# Patient Record
Sex: FEMALE | Race: BLACK OR AFRICAN AMERICAN | NOT HISPANIC OR LATINO | Employment: FULL TIME | URBAN - METROPOLITAN AREA
[De-identification: names, ages, dates, MRNs, and addresses within clinical notes are randomized per-mention and may not be internally consistent; named-entity substitution may affect disease eponyms.]

---

## 2017-05-09 ENCOUNTER — TRANSFERRED RECORDS (OUTPATIENT)
Dept: HEALTH INFORMATION MANAGEMENT | Facility: CLINIC | Age: 32
End: 2017-05-09

## 2017-05-09 LAB
HPV ABSTRACT: NORMAL
PAP-ABSTRACT: NORMAL

## 2018-01-18 ENCOUNTER — THERAPY VISIT (OUTPATIENT)
Dept: PHYSICAL THERAPY | Facility: CLINIC | Age: 33
End: 2018-01-18
Payer: COMMERCIAL

## 2018-01-18 DIAGNOSIS — M54.6 ACUTE BILATERAL THORACIC BACK PAIN: ICD-10-CM

## 2018-01-18 DIAGNOSIS — M25.512 ACUTE PAIN OF LEFT SHOULDER: Primary | ICD-10-CM

## 2018-01-18 PROCEDURE — 97161 PT EVAL LOW COMPLEX 20 MIN: CPT | Mod: GP | Performed by: PHYSICAL THERAPIST

## 2018-01-18 PROCEDURE — 97110 THERAPEUTIC EXERCISES: CPT | Mod: GP | Performed by: PHYSICAL THERAPIST

## 2018-01-18 NOTE — MR AVS SNAPSHOT
"              After Visit Summary   2018    Rocio Elkins    MRN: [id number]           Patient Information     Date Of Birth          1985        Visit Information        Provider Department      2018 7:00 PM Eduardo Tran PT [facility] [facility] [place]        Today's Diagnoses     Acute pain of left shoulder    -  1    Acute bilateral thoracic back pain           Follow-ups after your visit        Who to contact     If you have questions or need follow up information about today's clinic visit or your schedule please contact [facility] directly at [phone].  Normal or non-critical lab and imaging results will be communicated to you by YYogahart, letter or phone within 4 business days after the clinic has received the results. If you do not hear from us within 7 days, please contact the clinic through YYogahart or phone. If you have a critical or abnormal lab result, we will notify you by phone as soon as possible.  Submit refill requests through [employer]es[employer] or call your pharmacy and they will forward the refill request to us. Please allow 3 business days for your refill to be completed.          Additional Information About Your Visit        MyChart Information     AesRx lets you send messages to your doctor, view your test results, renew your prescriptions, schedule appointments and more. To sign up, go to [email or web address] . Click on \"Log in\" on the left side of the screen, which will take you to the Welcome page. Then click on \"Sign up Now\" on the right side of the page.     You will be asked to enter the access code listed below, as well as some personal information. Please follow the directions to create your username and password.     Your access code is: [email or web address]  Expires: 2018  7:19 PM     Your access code will  in 90 days. If you need help or a new code, please call your [place] clinic or [phone].        Care EveryWhere " ID     This is your Care EveryWhere ID. This could be used by other organizations to access your [facility] medical records  [id number]         Blood Pressure from Last 3 Encounters:   No data found for BP    Weight from Last 3 Encounters:   No data found for Wt              We Performed the Following     HC PT EVAL, LOW COMPLEXITY     CIRA INITIAL EVAL REPORT     THERAPEUTIC EXERCISES        Primary Care Provider    None Specified       No primary provider on file.        Equal Access to Services     [facility]: Hadii aad ku hadasho Soomaali, waaxda luqadaha, qaybta kaalmada adeegyada, waxay idiin hayaan adeeg khpatriciarosa larosan . So [facility] [phone].    ATENCIÓN: Si habla español, tiene a de dios disposición servicios gratuitos de asistencia lingüística. Llame al [phone].    We comply with applicable federal civil rights laws and [place] laws. We do not discriminate on the basis of race, color, national origin, age, disability, sex, sexual orientation, or gender identity.            Thank you!     Thank you for choosing [facility]  for your care. Our goal is always to provide you with excellent care. Hearing back from our patients is one way we can continue to improve our services. Please take a few minutes to complete the written survey that you may receive in the mail after your visit with us. Thank you!             Your Updated Medication List - Protect others around you: Learn how to safely use, store and throw away your medicines at www.disposemymeds.org.      Notice  As of 1/18/2018  7:19 PM    You have not been prescribed any medications.
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
## 2018-01-26 ENCOUNTER — THERAPY VISIT (OUTPATIENT)
Dept: PHYSICAL THERAPY | Facility: CLINIC | Age: 33
End: 2018-01-26
Payer: COMMERCIAL

## 2018-01-26 DIAGNOSIS — M54.6 ACUTE BILATERAL THORACIC BACK PAIN: ICD-10-CM

## 2018-01-26 DIAGNOSIS — M25.512 ACUTE PAIN OF LEFT SHOULDER: ICD-10-CM

## 2018-01-26 PROCEDURE — 97110 THERAPEUTIC EXERCISES: CPT | Mod: GP | Performed by: PHYSICAL THERAPIST

## 2018-01-26 PROCEDURE — 97140 MANUAL THERAPY 1/> REGIONS: CPT | Mod: GP | Performed by: PHYSICAL THERAPIST

## 2018-01-29 ENCOUNTER — OFFICE VISIT (OUTPATIENT)
Dept: FAMILY MEDICINE | Facility: CLINIC | Age: 33
End: 2018-01-29
Payer: COMMERCIAL

## 2018-01-29 VITALS
BODY MASS INDEX: 22.02 KG/M2 | OXYGEN SATURATION: 100 % | HEIGHT: 66 IN | DIASTOLIC BLOOD PRESSURE: 64 MMHG | TEMPERATURE: 97.6 F | SYSTOLIC BLOOD PRESSURE: 96 MMHG | WEIGHT: 137 LBS | HEART RATE: 64 BPM

## 2018-01-29 DIAGNOSIS — R52 BODY ACHES: Primary | ICD-10-CM

## 2018-01-29 DIAGNOSIS — J06.9 VIRAL URI: ICD-10-CM

## 2018-01-29 LAB
FLUAV+FLUBV AG SPEC QL: NEGATIVE
FLUAV+FLUBV AG SPEC QL: NEGATIVE
SPECIMEN SOURCE: NORMAL

## 2018-01-29 PROCEDURE — 87804 INFLUENZA ASSAY W/OPTIC: CPT | Performed by: FAMILY MEDICINE

## 2018-01-29 PROCEDURE — 99203 OFFICE O/P NEW LOW 30 MIN: CPT | Performed by: FAMILY MEDICINE

## 2018-01-29 RX ORDER — LEVOTHYROXINE SODIUM 100 UG/1
TABLET ORAL
Refills: 1 | COMMUNITY
Start: 2018-01-10 | End: 2018-10-15

## 2018-01-29 RX ORDER — IBUPROFEN 600 MG/1
TABLET, FILM COATED ORAL
COMMUNITY
Start: 2018-01-06 | End: 2018-10-04

## 2018-01-29 RX ORDER — CHOLECALCIFEROL (VITAMIN D3) 25 MCG
TABLET ORAL
Refills: 3 | COMMUNITY
Start: 2017-11-07 | End: 2018-10-15

## 2018-01-29 RX ORDER — PNV,CALCIUM 72/IRON/FOLIC ACID 27 MG-1 MG
TABLET ORAL
Refills: 1 | COMMUNITY
Start: 2017-11-07 | End: 2019-03-19

## 2018-01-29 NOTE — NURSING NOTE
"Chief Complaint   Patient presents with     Generalized Body Aches       Initial BP 96/64  Pulse 64  Temp 97.6  F (36.4  C) (Oral)  Ht 5' 6\" (1.676 m)  Wt 137 lb (62.1 kg)  SpO2 100%  BMI 22.11 kg/m2 Estimated body mass index is 22.11 kg/(m^2) as calculated from the following:    Height as of this encounter: 5' 6\" (1.676 m).    Weight as of this encounter: 137 lb (62.1 kg).  Medication Reconciliation: complete  "

## 2018-01-29 NOTE — MR AVS SNAPSHOT
"              After Visit Summary   2018    Rocio Elkins    MRN: [id number]           Patient Information     Date Of Birth          1985        Visit Information        Provider Department      2018 3:20 PM Blake Mcdaniel Jr., MD [facility] [place]        Today's Diagnoses     Body aches    -  1    Viral URI           Follow-ups after your visit        Follow-up notes from your care team     Return in about 2 weeks (around 2018), or left shoulder pain.      Who to contact     If you have questions or need follow up information about today's clinic visit or your schedule please contact [facility] directly at [phone].  Normal or non-critical lab and imaging results will be communicated to you by MyChart, letter or phone within 4 business days after the clinic has received the results. If you do not hear from us within 7 days, please contact the clinic through MyChart or phone. If you have a critical or abnormal lab result, we will notify you by phone as soon as possible.  Submit refill requests through [employer] or call your pharmacy and they will forward the refill request to us. Please allow 3 business days for your refill to be completed.          Additional Information About Your Visit        MyChart Information     [employer] lets you send messages to your doctor, view your test results, renew your prescriptions, schedule appointments and more. To sign up, go to [email or web address] . Click on \"Log in\" on the left side of the screen, which will take you to the Welcome page. Then click on \"Sign up Now\" on the right side of the page.     You will be asked to enter the access code listed below, as well as some personal information. Please follow the directions to create your username and password.     Your access code is: [email or web address]  Expires: 2018  7:19 PM     Your access code will  in 90 days. If you need help or a new code, please call your [place] " "clinic or [phone].        Care EveryWhere ID     This is your Care EveryWhere ID. This could be used by other organizations to access your [place] medical records  [id number]        Your Vitals Were     Pulse Temperature Height Pulse Oximetry BMI (Body Mass Index)       64 97.6  F (36.4  C) (Oral) 5' 6\" (1.676 m) 100% 22.11 kg/m2        Blood Pressure from Last 3 Encounters:   01/29/18 96/64    Weight from Last 3 Encounters:   01/29/18 137 lb (62.1 kg)              We Performed the Following     Influenza A/B antigen        Primary Care Provider Fax #    Provider Not In System [phone]                Equal Access to Services     [place] : Barrett Levine, may krause, saroj paul, lobito hernández . So [facility] [phone].    ATENCIÓN: Si habla español, tiene a de dios disposición servicios gratuitos de asistencia lingüística. Llame al [phone].    We comply with applicable federal civil rights laws and [place] laws. We do not discriminate on the basis of race, color, national origin, age, disability, sex, sexual orientation, or gender identity.            Thank you!     Thank you for choosing [facility]  for your care. Our goal is always to provide you with excellent care. Hearing back from our patients is one way we can continue to improve our services. Please take a few minutes to complete the written survey that you may receive in the mail after your visit with us. Thank you!             Your Updated Medication List - Protect others around you: Learn how to safely use, store and throw away your medicines at www.disposemymeds.org.          This list is accurate as of 1/29/18  4:25 PM.  Always use your most recent med list.                   Brand Name Dispense Instructions for use Diagnosis    * cholecalciferol 1000 UNIT tablet    vitamin D3     Take 1,000 Units by mouth        * cholecalciferol 1000 UNIT tablet    vitamin D3     " TK 1 T PO D        ibuprofen 600 MG tablet    ADVIL/MOTRIN          levothyroxine 100 MCG tablet    SYNTHROID/LEVOTHROID     TK 1 T PO D        PREPLUS 27-1 MG Tabs      TK 1 T PO DAILY        * Notice:  This list has 2 medication(s) that are the same as other medications prescribed for you. Read the directions carefully, and ask your doctor or other care provider to review them with you.
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
## 2018-08-31 ENCOUNTER — OFFICE VISIT (OUTPATIENT)
Dept: FAMILY MEDICINE | Facility: CLINIC | Age: 33
End: 2018-08-31
Payer: COMMERCIAL

## 2018-08-31 ENCOUNTER — TELEPHONE (OUTPATIENT)
Dept: FAMILY MEDICINE | Facility: CLINIC | Age: 33
End: 2018-08-31

## 2018-08-31 VITALS
TEMPERATURE: 98 F | BODY MASS INDEX: 23.08 KG/M2 | DIASTOLIC BLOOD PRESSURE: 63 MMHG | HEART RATE: 87 BPM | WEIGHT: 143 LBS | SYSTOLIC BLOOD PRESSURE: 100 MMHG

## 2018-08-31 DIAGNOSIS — R31.9 URINARY TRACT INFECTION WITH HEMATURIA, SITE UNSPECIFIED: ICD-10-CM

## 2018-08-31 DIAGNOSIS — R30.0 DYSURIA: Primary | ICD-10-CM

## 2018-08-31 DIAGNOSIS — N39.0 URINARY TRACT INFECTION WITH HEMATURIA, SITE UNSPECIFIED: ICD-10-CM

## 2018-08-31 DIAGNOSIS — R82.90 ABNORMAL FINDING IN URINE: ICD-10-CM

## 2018-08-31 LAB
ALBUMIN UR-MCNC: 100 MG/DL
APPEARANCE UR: ABNORMAL
BACTERIA #/AREA URNS HPF: ABNORMAL /HPF
BILIRUB UR QL STRIP: NEGATIVE
COLOR UR AUTO: YELLOW
GLUCOSE UR STRIP-MCNC: NEGATIVE MG/DL
HGB UR QL STRIP: ABNORMAL
KETONES UR STRIP-MCNC: NEGATIVE MG/DL
LEUKOCYTE ESTERASE UR QL STRIP: ABNORMAL
NITRATE UR QL: POSITIVE
NON-SQ EPI CELLS #/AREA URNS LPF: ABNORMAL /LPF
PH UR STRIP: 5.5 PH (ref 5–7)
RBC #/AREA URNS AUTO: ABNORMAL /HPF
SOURCE: ABNORMAL
SP GR UR STRIP: 1.02 (ref 1–1.03)
UROBILINOGEN UR STRIP-ACNC: 0.2 EU/DL (ref 0.2–1)
WBC #/AREA URNS AUTO: >100 /HPF

## 2018-08-31 PROCEDURE — 81001 URINALYSIS AUTO W/SCOPE: CPT | Performed by: FAMILY MEDICINE

## 2018-08-31 PROCEDURE — 87086 URINE CULTURE/COLONY COUNT: CPT | Performed by: FAMILY MEDICINE

## 2018-08-31 PROCEDURE — 99213 OFFICE O/P EST LOW 20 MIN: CPT | Performed by: FAMILY MEDICINE

## 2018-08-31 PROCEDURE — 87088 URINE BACTERIA CULTURE: CPT | Performed by: FAMILY MEDICINE

## 2018-08-31 PROCEDURE — 87186 SC STD MICRODIL/AGAR DIL: CPT | Performed by: FAMILY MEDICINE

## 2018-08-31 RX ORDER — CIPROFLOXACIN 500 MG/1
500 TABLET, FILM COATED ORAL 2 TIMES DAILY
Qty: 10 TABLET | Refills: 0 | Status: SHIPPED | OUTPATIENT
Start: 2018-08-31 | End: 2018-10-04

## 2018-08-31 NOTE — PROGRESS NOTES
SUBJECTIVE:   Rocio Elkins is a 32 year old female who presents to clinic today for the following health issues:      URINARY TRACT SYMPTOMS      Duration: yesterday     Description  hematuria    Intensity:  mild    Accompanying signs and symptoms:  Fever/chills: YES  Flank pain YES  Nausea and vomiting: no   Vaginal symptoms: none  Abdominal/Pelvic Pain: YES    History  History of frequent UTI's: no   History of kidney stones: no   Sexually Active: YES  Possibility of pregnancy: Don't Know    Precipitating or alleviating factors: None    Therapies tried and outcome: none   Outcome:       Problem list and histories reviewed & adjusted, as indicated.  Additional history: as documented    Patient Active Problem List   Diagnosis     Acute pain of left shoulder     Acute bilateral thoracic back pain     LTBI (latent tuberculosis infection)     Other postablative hypothyroidism     Thyrotoxic exophthalmos     Hypovitaminosis D     No past surgical history on file.    Social History   Substance Use Topics     Smoking status: Never Smoker     Smokeless tobacco: Never Used     Alcohol use No     No family history on file.      Current Outpatient Prescriptions   Medication Sig Dispense Refill     cholecalciferol (VITAMIN D3) 1000 UNIT tablet Take 1,000 Units by mouth       ciprofloxacin (CIPRO) 500 MG tablet Take 1 tablet (500 mg) by mouth 2 times daily 10 tablet 0     levothyroxine (SYNTHROID/LEVOTHROID) 100 MCG tablet TK 1 T PO D  1     phenazopyridine (AZO) 97.5 MG tablet Take 2 tablets (195 mg) by mouth 3 times daily 12 tablet 1     Prenatal Vit-Fe Fumarate-FA (PREPLUS) 27-1 MG TABS TK 1 T PO DAILY  1     VITAMIN D3 1000 UNITS tablet TK 1 T PO D  3     ibuprofen (ADVIL/MOTRIN) 600 MG tablet        Allergies   Allergen Reactions     Pseudoephedrine      Other reaction(s): Other, see comments  Dizziness, leg weakness     No lab results found.   BP Readings from Last 3 Encounters:   08/31/18 100/63   01/29/18 96/64     Wt Readings from Last 3 Encounters:   08/31/18 143 lb (64.9 kg)   01/29/18 137 lb (62.1 kg)                    Reviewed and updated as needed this visit by clinical staff  Allergies       Reviewed and updated as needed this visit by Provider          Rocio Elkins is a 32 year old female who  presents today for a possible UTI. Symptoms of dysuria and frequency have been going on for 1day(s).  Hematuria yes .  sudden onsetand moderate.  There is no history of fever, chills, nausea or vomiting.  No history of vaginal or penile discharge. This patient does have a history of urinary tract infections. Patient denies long duration, rigors, flank pain, temperature > 101 degrees F., Vomiting, significant nausea or diarrhea and taking Coumadin or vaginal discharge and vaginal odor     No past medical history on file.  Current Outpatient Prescriptions   Medication Sig Dispense Refill     cholecalciferol (VITAMIN D3) 1000 UNIT tablet Take 1,000 Units by mouth       ciprofloxacin (CIPRO) 500 MG tablet Take 1 tablet (500 mg) by mouth 2 times daily 10 tablet 0     levothyroxine (SYNTHROID/LEVOTHROID) 100 MCG tablet TK 1 T PO D  1     phenazopyridine (AZO) 97.5 MG tablet Take 2 tablets (195 mg) by mouth 3 times daily 12 tablet 1     Prenatal Vit-Fe Fumarate-FA (PREPLUS) 27-1 MG TABS TK 1 T PO DAILY  1     VITAMIN D3 1000 UNITS tablet TK 1 T PO D  3     ibuprofen (ADVIL/MOTRIN) 600 MG tablet        Social History   Substance Use Topics     Smoking status: Never Smoker     Smokeless tobacco: Never Used     Alcohol use No       ROS:   Review of systems negative except as stated above.    OBJECTIVE:  /63 (BP Location: Right arm, Patient Position: Chair, Cuff Size: Adult Regular)  Pulse 87  Temp 98  F (36.7  C) (Tympanic)  Wt 143 lb (64.9 kg)  LMP 08/16/2018  BMI 23.08 kg/m2  GENERAL APPEARANCE: healthy, alert and no distress  RESP: lungs clear to auscultation - no rales, rhonchi or wheezes  CV: regular rates and rhythm,  normal S1 S2, no murmur noted  ABDOMEN:  soft, nontender, no HSM or masses and bowel sounds normal  BACK: No CVA tenderness  SKIN: no suspicious lesions or rashes    ASSESSMENT:   Rocio was seen today for uti.    Diagnoses and all orders for this visit:    Dysuria  -     Cancel: UA reflex to Microscopic and Culture  -     Cancel: *UA reflex to Microscopic and Culture ([facility] (except [place] and [place]); Future  -     Cancel: *UA reflex to Microscopic and Culture ([facility] (except [place] and [place])  -     *UA reflex to Microscopic and Culture ([facility] (except [place] and [place]); Future  -     *UA reflex to Microscopic and Culture ([facility] (except [place] and [place])  -     Urine Microscopic  -     ciprofloxacin (CIPRO) 500 MG tablet; Take 1 tablet (500 mg) by mouth 2 times daily  -     phenazopyridine (AZO) 97.5 MG tablet; Take 2 tablets (195 mg) by mouth 3 times daily    Abnormal finding in urine  -     Urine Culture Aerobic Bacterial    Urinary tract infection with hematuria, site unspecified  -     ciprofloxacin (CIPRO) 500 MG tablet; Take 1 tablet (500 mg) by mouth 2 times daily  -     phenazopyridine (AZO) 97.5 MG tablet; Take 2 tablets (195 mg) by mouth 3 times daily        Drink plenty of fluids.  Prevention and treatment of UTI's discussed.Signs and symptoms of pyelonephritis mentioned.  Follow up with primary care physician if not improving

## 2018-08-31 NOTE — TELEPHONE ENCOUNTER
Patient is sick and thinks she may have a UTI  Requesting to be seen; no openings. Patient is currently at work. Please call patient on her cell at [phone]  Madeline DERAS

## 2018-08-31 NOTE — TELEPHONE ENCOUNTER
patient is having burning with urination and would like to be added to  A schedule ok per Dr. Benavidez to see her at noon today- UA ordered- patient will leave a sample prior to the appointment time.    Elham QuirozRN BSN  [facility]  [phone]

## 2018-08-31 NOTE — MR AVS SNAPSHOT
"              After Visit Summary   8/31/2018    Rocio Elkins    MRN: [id number]           Patient Information     Date Of Birth          1985        Visit Information        Provider Department      8/31/2018 12:00 PM [facility]        Today's Diagnoses     Dysuria    -  1    Abnormal finding in urine        Urinary tract infection with hematuria, site unspecified           Follow-ups after your visit        Follow-up notes from your care team     Return in about 1 year (around 8/31/2019) for Routine Visit, Physical Exam, Lab Work.      Who to contact     If you have questions or need follow up information about today's clinic visit or your schedule please contact [facility] directly at [phone].  Normal or non-critical lab and imaging results will be communicated to you by MyChart, letter or phone within 4 business days after the clinic has received the results. If you do not hear from us within 7 days, please contact the clinic through MyChart or phone. If you have a critical or abnormal lab result, we will notify you by phone as soon as possible.  Submit refill requests through [employer] or call your pharmacy and they will forward the refill request to us. Please allow 3 business days for your refill to be completed.          Additional Information About Your Visit        MyChart Information     [employer] lets you send messages to your doctor, view your test results, renew your prescriptions, schedule appointments and more. To sign up, go to [email or web address] . Click on \"Log in\" on the left side of the screen, which will take you to the Welcome page. Then click on \"Sign up Now\" on the right side of the page.     You will be asked to enter the access code listed below, as well as some personal information. Please follow the directions to create your username and password.     Your access code is: [email or web address]  Expires: 11/29/2018 12:29 PM   "   Your access code will  in 90 days. If you need help or a new code, please call your [facility] clinic or [phone].        Care EveryWhere ID     This is your Care EveryWhere ID. This could be used by other organizations to access your [facility] medical records  [id number]        Your Vitals Were     Pulse Temperature Last Period BMI (Body Mass Index)          87 98  F (36.7  C) (Tympanic) 2018 23.08 kg/m2         Blood Pressure from Last 3 Encounters:   18 100/63   18 96/64    Weight from Last 3 Encounters:   18 143 lb (64.9 kg)   18 137 lb (62.1 kg)              We Performed the Following     *UA reflex to Microscopic and Culture ([place] and [facility] (except [place] and [place])     Urine Culture Aerobic Bacterial     Urine Microscopic          Today's Medication Changes          These changes are accurate as of 18 12:29 PM.  If you have any questions, ask your nurse or doctor.               Start taking these medicines.        Dose/Directions    ciprofloxacin 500 MG tablet   Commonly known as:  CIPRO   Used for:  Dysuria, Urinary tract infection with hematuria, site unspecified   Started by:  Viet Benavidez MD        Dose:  500 mg   Take 1 tablet (500 mg) by mouth 2 times daily   Quantity:  10 tablet   Refills:  0       phenazopyridine 97.5 MG tablet   Commonly known as:  AZO   Used for:  Dysuria, Urinary tract infection with hematuria, site unspecified   Started by:  Viet Benavidez MD        Dose:  195 mg   Take 2 tablets (195 mg) by mouth 3 times daily   Quantity:  12 tablet   Refills:  1            Where to get your medicines      These medications were sent to [facility] [place] [address]  [address] [facility] [address]     Phone:  [phone]     ciprofloxacin 500 MG tablet    phenazopyridine 97.5 MG tablet                Primary Care Provider Office Phone # Fax #    Dulce Guevara  [facility] [phone]       [address] [place] [address]        Equal Access to Services     MORELIA MONTANA : Hadii farzad de leon carlos Levine, waricoda lufloyd, qaybta kamoriahda humberto, lobito walter. So [facility] [phone].    ATENCIÓN: Si habla español, tiene a de dios disposición servicios gratuitos de asistencia lingüística. Llame al [phone].    We comply with applicable federal civil rights laws and [place] laws. We do not discriminate on the basis of race, color, national origin, age, disability, sex, sexual orientation, or gender identity.            Thank you!     [facility]. Our goal is always to provide you with excellent care. Hearing back from our patients is one way we can continue to improve our services. Please take a few minutes to complete the written survey that you may receive in the mail after your visit with us. Thank you!             Your Updated Medication List - Protect others around you: Learn how to safely use, store and throw away your medicines at www.disposemymeds.org.          This list is accurate as of 8/31/18 12:29 PM.  Always use your most recent med list.                   Brand Name Dispense Instructions for use Diagnosis    * cholecalciferol 1000 UNIT tablet    vitamin D3     Take 1,000 Units by mouth        * cholecalciferol 1000 UNIT tablet    vitamin D3     TK 1 T PO D        ciprofloxacin 500 MG tablet    CIPRO    10 tablet    Take 1 tablet (500 mg) by mouth 2 times daily    Dysuria, Urinary tract infection with hematuria, site unspecified       ibuprofen 600 MG tablet    ADVIL/MOTRIN          levothyroxine 100 MCG tablet    SYNTHROID/LEVOTHROID     TK 1 T PO D        phenazopyridine 97.5 MG tablet    AZO    12 tablet    Take 2 tablets (195 mg) by mouth 3 times daily    Dysuria, Urinary tract infection with hematuria, site unspecified       PREPLUS 27-1 MG Tabs      TK 1 T PO  DAILY        * Notice:  This list has 2 medication(s) that are the same as other medications prescribed for you. Read the directions carefully, and ask your doctor or other care provider to review them with you.

## 2018-08-31 NOTE — LETTER
[address] [address]  Phone: [phone]    August 31, 2018        Rocio Elkins  [address] [place] [address]   [facility] [address]          To whom it may concern:    RE: Rocio Elkins    Patient was seen and treated today at our clinic. Based on her symptom, please excuse her from her duty for 1 day.     Please contact me for questions or concerns.      Sincerely,        Viet Benavidez MD

## 2018-09-02 LAB
BACTERIA SPEC CULT: ABNORMAL
SPECIMEN SOURCE: ABNORMAL

## 2018-09-25 ENCOUNTER — APPOINTMENT (OUTPATIENT)
Dept: ULTRASOUND IMAGING | Facility: CLINIC | Age: 33
End: 2018-09-25
Attending: NURSE PRACTITIONER
Payer: COMMERCIAL

## 2018-09-25 ENCOUNTER — HOSPITAL ENCOUNTER (EMERGENCY)
Facility: CLINIC | Age: 33
Discharge: HOME | End: 2018-09-25
Attending: NURSE PRACTITIONER | Admitting: NURSE PRACTITIONER
Payer: COMMERCIAL

## 2018-09-25 VITALS
RESPIRATION RATE: 14 BRPM | WEIGHT: 141 LBS | SYSTOLIC BLOOD PRESSURE: 104 MMHG | HEART RATE: 96 BPM | OXYGEN SATURATION: 100 % | DIASTOLIC BLOOD PRESSURE: 72 MMHG | HEIGHT: 67 IN | BODY MASS INDEX: 22.13 KG/M2 | TEMPERATURE: 97.7 F

## 2018-09-25 DIAGNOSIS — R10.2 ABDOMINAL PAIN, SUPRAPUBIC: ICD-10-CM

## 2018-09-25 DIAGNOSIS — Z3A.01 LESS THAN 8 WEEKS GESTATION OF PREGNANCY: ICD-10-CM

## 2018-09-25 LAB
ABO + RH BLD: NORMAL
ALBUMIN UR-MCNC: NEGATIVE MG/DL
ANION GAP SERPL CALCULATED.3IONS-SCNC: 11 MMOL/L (ref 3–14)
APPEARANCE UR: CLEAR
B-HCG SERPL-ACNC: ABNORMAL IU/L (ref 0–5)
BASOPHILS # BLD AUTO: 0 10E9/L (ref 0–0.2)
BASOPHILS NFR BLD AUTO: 0.2 %
BILIRUB UR QL STRIP: NEGATIVE
BLD GP AB SCN SERPL QL: NORMAL
BLOOD BANK CMNT PATIENT-IMP: NORMAL
BUN SERPL-MCNC: 6 MG/DL (ref 7–30)
CALCIUM SERPL-MCNC: 9.5 MG/DL (ref 8.5–10.1)
CHLORIDE SERPL-SCNC: 106 MMOL/L (ref 94–109)
CO2 SERPL-SCNC: 21 MMOL/L (ref 20–32)
COLOR UR AUTO: YELLOW
CREAT SERPL-MCNC: 0.62 MG/DL (ref 0.52–1.04)
DIFFERENTIAL METHOD BLD: ABNORMAL
EOSINOPHIL # BLD AUTO: 0.1 10E9/L (ref 0–0.7)
EOSINOPHIL NFR BLD AUTO: 0.8 %
ERYTHROCYTE [DISTWIDTH] IN BLOOD BY AUTOMATED COUNT: 13 % (ref 10–15)
FETAL CELL SCN BLD QL ROSETTE: NORMAL
GFR SERPL CREATININE-BSD FRML MDRD: >90 ML/MIN/1.7M2
GLUCOSE SERPL-MCNC: 80 MG/DL (ref 70–99)
GLUCOSE UR STRIP-MCNC: NEGATIVE MG/DL
HCG SERPL QL: POSITIVE
HCT VFR BLD AUTO: 41.5 % (ref 35–47)
HGB BLD-MCNC: 14.5 G/DL (ref 11.7–15.7)
HGB UR QL STRIP: NEGATIVE
IMM GRANULOCYTES # BLD: 0 10E9/L (ref 0–0.4)
IMM GRANULOCYTES NFR BLD: 0.3 %
KETONES UR STRIP-MCNC: 80 MG/DL
LEUKOCYTE ESTERASE UR QL STRIP: NEGATIVE
LYMPHOCYTES # BLD AUTO: 2.1 10E9/L (ref 0.8–5.3)
LYMPHOCYTES NFR BLD AUTO: 19.1 %
MCH RBC QN AUTO: 29.4 PG (ref 26.5–33)
MCHC RBC AUTO-ENTMCNC: 34.9 G/DL (ref 31.5–36.5)
MCV RBC AUTO: 84 FL (ref 78–100)
MONOCYTES # BLD AUTO: 0.6 10E9/L (ref 0–1.3)
MONOCYTES NFR BLD AUTO: 5.6 %
MUCOUS THREADS #/AREA URNS LPF: PRESENT /LPF
NEUTROPHILS # BLD AUTO: 8.2 10E9/L (ref 1.6–8.3)
NEUTROPHILS NFR BLD AUTO: 74 %
NITRATE UR QL: NEGATIVE
NRBC # BLD AUTO: 0 10*3/UL
NRBC BLD AUTO-RTO: 0 /100
PH UR STRIP: 8 PH (ref 5–7)
PLATELET # BLD AUTO: 225 10E9/L (ref 150–450)
POTASSIUM SERPL-SCNC: 3.6 MMOL/L (ref 3.4–5.3)
RBC # BLD AUTO: 4.94 10E12/L (ref 3.8–5.2)
RBC #/AREA URNS AUTO: <1 /HPF (ref 0–2)
SODIUM SERPL-SCNC: 138 MMOL/L (ref 133–144)
SOURCE: ABNORMAL
SP GR UR STRIP: 1.01 (ref 1–1.03)
SPECIMEN EXP DATE BLD: NORMAL
SQUAMOUS #/AREA URNS AUTO: 1 /HPF (ref 0–1)
UROBILINOGEN UR STRIP-MCNC: NORMAL MG/DL (ref 0–2)
WBC # BLD AUTO: 11.1 10E9/L (ref 4–11)
WBC #/AREA URNS AUTO: <1 /HPF (ref 0–5)

## 2018-09-25 PROCEDURE — 81001 URINALYSIS AUTO W/SCOPE: CPT | Performed by: NURSE PRACTITIONER

## 2018-09-25 PROCEDURE — 25000128 H RX IP 250 OP 636: Performed by: NURSE PRACTITIONER

## 2018-09-25 PROCEDURE — 85025 COMPLETE CBC W/AUTO DIFF WBC: CPT | Performed by: NURSE PRACTITIONER

## 2018-09-25 PROCEDURE — 76817 TRANSVAGINAL US OBSTETRIC: CPT

## 2018-09-25 PROCEDURE — 86901 BLOOD TYPING SEROLOGIC RH(D): CPT | Performed by: NURSE PRACTITIONER

## 2018-09-25 PROCEDURE — 80048 BASIC METABOLIC PNL TOTAL CA: CPT | Performed by: NURSE PRACTITIONER

## 2018-09-25 PROCEDURE — 85461 HEMOGLOBIN FETAL: CPT | Performed by: NURSE PRACTITIONER

## 2018-09-25 PROCEDURE — 86900 BLOOD TYPING SEROLOGIC ABO: CPT | Performed by: NURSE PRACTITIONER

## 2018-09-25 PROCEDURE — 84702 CHORIONIC GONADOTROPIN TEST: CPT | Performed by: NURSE PRACTITIONER

## 2018-09-25 PROCEDURE — 96374 THER/PROPH/DIAG INJ IV PUSH: CPT

## 2018-09-25 PROCEDURE — 84703 CHORIONIC GONADOTROPIN ASSAY: CPT | Performed by: NURSE PRACTITIONER

## 2018-09-25 PROCEDURE — 86850 RBC ANTIBODY SCREEN: CPT | Performed by: NURSE PRACTITIONER

## 2018-09-25 PROCEDURE — 99284 EMERGENCY DEPT VISIT MOD MDM: CPT | Mod: 25

## 2018-09-25 RX ORDER — ONDANSETRON 2 MG/ML
4 INJECTION INTRAMUSCULAR; INTRAVENOUS ONCE
Status: COMPLETED | OUTPATIENT
Start: 2018-09-25 | End: 2018-09-25

## 2018-09-25 RX ORDER — ONDANSETRON 2 MG/ML
4 INJECTION INTRAMUSCULAR; INTRAVENOUS
Status: DISCONTINUED | OUTPATIENT
Start: 2018-09-25 | End: 2018-09-25 | Stop reason: HOSPADM

## 2018-09-25 RX ADMIN — ONDANSETRON 4 MG: 2 INJECTION INTRAMUSCULAR; INTRAVENOUS at 13:10

## 2018-09-25 ASSESSMENT — ENCOUNTER SYMPTOMS
NAUSEA: 1
ABDOMINAL PAIN: 1
HEMATURIA: 0
VOMITING: 1
CONSTIPATION: 0
DIARRHEA: 0
DYSURIA: 0

## 2018-09-25 NOTE — ED AVS SNAPSHOT
Emergency Department    [address] [facility] [address]    Phone:  [phone]    Fax:  [phone]                                       Rocio Elkins   MRN: [id number]    Department:   Emergency Department   Date of Visit:  9/25/2018           Patient Information     Date Of Birth          1985        Your diagnoses for this visit were:     Abdominal pain, suprapubic     Less than 8 weeks gestation of pregnancy        You were seen by Vidhya Duke, CNP.      Follow-up Information     Follow up with OB/GYN INFERTILITY In 2 days.    Why:  or with your OB for recheck of symptoms, HCG, and pelvic examination or return to ED with any change or worsening symptoms.     Contact information:    [address]  [facility] [phone]        Follow up with  Emergency Department.    Specialty:  EMERGENCY MEDICINE    Why:  As needed, If symptoms worsen    Contact information:    [address] [facility] [address] [phone]        Discharge Instructions         Pelvic Pain, Uncertain Cause    Pelvic pain is pain felt in the lowest part of the belly (abdomen) and between the hipbones. The pain may occur suddenly and recently (acute). Or the pain may last for 6 months or longer (chronic).  There are many possible causes of pelvic pain. The pain may be due to a problem in the female reproductive system. Or, it may be due to a problem in the digestive, urinary, or musculoskeletal systems.  Based on your visit today, the exact cause of your pelvic pain is not certain. Your condition does not appear to be serious at this time. But it is important for you to keep watching for any new symptoms or worsening of your condition.  General care  Your healthcare provider may advise a number of ways to help manage your pain. These can include:    Taking over-the-counter pain medicine. Stronger pain medicine may also be prescribed, if needed.    Applying  "heat to the pelvic area. Use a heating pad or a hot pack. Taking a hot bath may also help.    Getting plenty of rest.    Making certain lifestyle changes. These can include practicing good posture and getting regular exercise. Studies have shown that these changes help reduce pelvic pain in some women.    Seeing a physical therapist or pain specialist. These healthcare providers can discuss other ways to manage pain with you.  Follow-up care  Follow up with your healthcare provider, or as advised.   When to seek medical advice  Call your healthcare provider right away if any of the following occur:    Fever of 100.4 F or higher, or as directed by your healthcare provider    Pain worsens or you have sudden, severe pain or new pain    Nausea, vomiting, sweating, or restlessness    Dizziness or fainting    Unusual vaginal discharge    Abnormal vaginal bleeding (especially bleeding after menopause)  Date Last Reviewed: 10/1/2017    8351-5743 [employer]. [address] [place] [address]. All rights reserved. This information is not intended as a substitute for professional medical care. Always follow your healthcare professional's instructions.          Pregnancy: Your First Trimester Changes  The first trimester is a time of rapid development for your baby. Because your baby is growing so quickly, it is important that you start a healthy lifestyle right away. By the end of the first trimester, your baby has formed all of its major body organs and weighs just over an ounce.     Actual size of baby is 1/4\"    Month 1 (weeks 1 to 4)  The placenta (the organ that nourishes your baby) begins to form. The brain, spinal cord, heart, gastrointestinal tract, and lungs begin to develop. Your baby is about 1/4-inch long by the end of the first month.     Actual size of baby is 1\"      Month 2 (weeks 5 to 8)  All of your baby s major body organs form. The face, fingers, toes, ears, and eyes appear. By the " "end of the month, your baby is about 1-inch long.     Actual size of baby is 3\"      Month 3 (weeks 9 to 12)  Your baby can open and close its fists and mouth. The sexual organs begin to form. As the first trimester ends, your baby is about 3-inches long.  Date Last Reviewed: 10/1/2017    1679-4910 [employer]. [address] [place] [address]. All rights reserved. This information is not intended as a substitute for professional medical care. Always follow your healthcare professional's instructions.          Your next 10 appointments already scheduled     Sep 28, 2018  8:00 AM CDT   Office Visit with OMAR [facility] ([facility])    [address] [place] [address] [phone]           Bring a current list of meds and any records pertaining to this visit. For Physicals, please bring immunization records and any forms needing to be filled out. Please arrive 10 minutes early to complete paperwork.              24 Hour Appointment Hotline       To make an appointment at any [facility], call [phone]). If you don't have a family doctor or clinic, we will help you find one. [facility] are conveniently located to serve the needs of you and your family.             Review of your medicines      Our records show that you are taking the medicines listed below. If these are incorrect, please call your family doctor or clinic.        Dose / Directions Last dose taken    * cholecalciferol 1000 UNIT tablet   Commonly known as:  vitamin D3   Dose:  1000 Units        Take 1,000 Units by mouth   Refills:  0        * cholecalciferol 1000 UNIT tablet   Commonly known as:  vitamin D3        TK 1 T PO D   Refills:  3        ciprofloxacin 500 MG tablet   Commonly known as:  CIPRO   Dose:  500 mg   Quantity:  10 tablet        Take 1 tablet (500 mg) by mouth 2 times daily   Refills:  0        ibuprofen 600 MG tablet "   Commonly known as:  ADVIL/MOTRIN        Refills:  0        levothyroxine 100 MCG tablet   Commonly known as:  SYNTHROID/LEVOTHROID        TK 1 T PO D   Refills:  1        phenazopyridine 97.5 MG tablet   Commonly known as:  AZO   Dose:  195 mg   Quantity:  12 tablet        Take 2 tablets (195 mg) by mouth 3 times daily   Refills:  1        PREPLUS 27-1 MG Tabs        TK 1 T PO DAILY   Refills:  1        * Notice:  This list has 2 medication(s) that are the same as other medications prescribed for you. Read the directions carefully, and ask your doctor or other care provider to review them with you.            Procedures and tests performed during your visit     ABO/Rh type and screen    Basic metabolic panel    CBC with platelets differential    HCG qualitative Blood    HCG quantitative pregnancy    Rh Immune Globulin Study    Rho (D) immune globulin (RhoGam) Lab Study    UA with Microscopic reflex to Culture    US OB < 14 Weeks w Transvaginal      Orders Needing Specimen Collection     None      Pending Results     No orders found from 9/23/2018 to 9/26/2018.            Pending Culture Results     No orders found from 9/23/2018 to 9/26/2018.            Pending Results Instructions     If you had any lab results that were not finalized at the time of your Discharge, you can call the ED Lab Result RN at [phone]. You will be contacted by this team for any positive Lab results or changes in treatment. The nurses are available 7 days a week from 10A to 6:30P.  You can leave a message 24 hours per day and they will return your call.        Test Results From Your Hospital Stay        9/25/2018  1:28 PM      Component Results     Component Value Ref Range & Units Status    WBC 11.1 (H) 4.0 - 11.0 10e9/L Final    RBC Count 4.94 3.8 - 5.2 10e12/L Final    Hemoglobin 14.5 11.7 - 15.7 g/dL Final    Hematocrit 41.5 35.0 - 47.0 % Final    MCV 84 78 - 100 fl Final    MCH 29.4 26.5 - 33.0 pg Final    MCHC 34.9 31.5 - 36.5  g/dL Final    RDW 13.0 10.0 - 15.0 % Final    Platelet Count 225 150 - 450 10e9/L Final    Diff Method Automated Method  Final    % Neutrophils 74.0 % Final    % Lymphocytes 19.1 % Final    % Monocytes 5.6 % Final    % Eosinophils 0.8 % Final    % Basophils 0.2 % Final    % Immature Granulocytes 0.3 % Final    Nucleated RBCs 0 0 /100 Final    Absolute Neutrophil 8.2 1.6 - 8.3 10e9/L Final    Absolute Lymphocytes 2.1 0.8 - 5.3 10e9/L Final    Absolute Monocytes 0.6 0.0 - 1.3 10e9/L Final    Absolute Eosinophils 0.1 0.0 - 0.7 10e9/L Final    Absolute Basophils 0.0 0.0 - 0.2 10e9/L Final    Abs Immature Granulocytes 0.0 0 - 0.4 10e9/L Final    Absolute Nucleated RBC 0.0  Final         9/25/2018  1:53 PM      Component Results     Component Value Ref Range & Units Status    Sodium 138 133 - 144 mmol/L Final    Potassium 3.6 3.4 - 5.3 mmol/L Final    Chloride 106 94 - 109 mmol/L Final    Carbon Dioxide 21 20 - 32 mmol/L Final    Anion Gap 11 3 - 14 mmol/L Final    Glucose 80 70 - 99 mg/dL Final    Urea Nitrogen 6 (L) 7 - 30 mg/dL Final    Creatinine 0.62 0.52 - 1.04 mg/dL Final    GFR Estimate >90 >60 mL/min/1.7m2 Final    Non  GFR Calc    GFR Estimate If Black >90 >60 mL/min/1.7m2 Final    African American GFR Calc    Calcium 9.5 8.5 - 10.1 mg/dL Final         9/25/2018  1:35 PM      Component Results     Component Value Ref Range & Units Status    HCG Qualitative Serum Positive (A) NEG^Negative Final    This test is for screening purposes.  Results should be interpreted along with   the clinical picture.  Confirmation testing is available if warranted by   ordering [id number], HCG Quantitative Pregnancy.           9/25/2018  2:02 PM      Component Results     Component Value Ref Range & Units Status    ABO O  Final    RH(D) Pos  Final    Antibody Screen Neg  Final    Test Valid Only At [facility]     Final    Specimen Expires 09/28/2018  Final         9/25/2018  1:18 PM      Component  Results     Component    Rhogam Order    Order received    Comment:    See Rhogam Study/Suitability         9/25/2018  2:25 PM      Narrative     ULTRASOUND OBSTETRIC FIRST TRIMESTER WITH TRANSVAGINAL IMAGING   9/25/2018 2:17 PM    HISTORY: 5w5d with cramping.     TECHNIQUE: Transabdominal and transvaginal imaging were performed.   Transvaginal imaging was performed to better evaluate the uterus and  gestational sac.    COMPARISON:  None.    FINDINGS:    Estimated gestational age by current ultrasound measurement: 6 weeks 2  days.  Estimated date of delivery based on this ultrasound: May 19, 2019.  Crown-rump length: 0.48 cm.   Embryonic cardiac activity: 151 bpm.   Yolk sac: Present.  Subchorionic hemorrhage: None.    Right ovary: Unremarkable.  Left ovary: Unremarkable.  Adnexal mass: None.  Free pelvic fluid: None.        Impression     IMPRESSION: Single living intrauterine pregnancy. No acute process  demonstrated.    KARISSA COHCRAN MD         9/25/2018  4:05 PM      Component Results     Component Value Ref Range & Units Status    Color Urine Yellow  Final    Appearance Urine Clear  Final    Glucose Urine Negative NEG^Negative mg/dL Final    Bilirubin Urine Negative NEG^Negative Final    Ketones Urine 80 (A) NEG^Negative mg/dL Final    Specific Gravity Urine 1.015 1.003 - 1.035 Final    Blood Urine Negative NEG^Negative Final    pH Urine 8.0 (H) 5.0 - 7.0 pH Final    Protein Albumin Urine Negative NEG^Negative mg/dL Final    Urobilinogen mg/dL Normal 0.0 - 2.0 mg/dL Final    Nitrite Urine Negative NEG^Negative Final    Leukocyte Esterase Urine Negative NEG^Negative Final    Source Midstream Urine  Final    WBC Urine <1 0 - 5 /HPF Final    RBC Urine <1 0 - 2 /HPF Final    Squamous Epithelial /HPF Urine 1 0 - 1 /HPF Final    Mucous Urine Present (A) NEG^Negative /LPF Final         9/25/2018  2:05 PM      Component Results     Component    ABO    O    RH(D)    Pos    Fetal Blood Screen    Canceled, Test credited     Blood Bank Comment    Not suitable for Rh Immune Globulin  PATIENT IS RH POS           9/25/2018  3:38 PM      Component Results     Component Value Ref Range & Units Status    HCG Quantitative Serum 49696 (H) 0 - 5 IU/L Final                Clinical Quality Measure: Blood Pressure Screening     Your blood pressure was checked while you were in the emergency department today. The last reading we obtained was  BP: 104/72 . Please read the guidelines below about what these numbers mean and what you should do about them.  If your systolic blood pressure (the top number) is less than 120 and your diastolic blood pressure (the bottom number) is less than 80, then your blood pressure is normal. There is nothing more that you need to do about it.  If your systolic blood pressure (the top number) is 120-139 or your diastolic blood pressure (the bottom number) is 80-89, your blood pressure may be higher than it should be. You should have your blood pressure rechecked within a year by a primary care provider.  If your systolic blood pressure (the top number) is 140 or greater or your diastolic blood pressure (the bottom number) is 90 or greater, you may have high blood pressure. High blood pressure is treatable, but if left untreated over time it can put you at risk for heart attack, stroke, or kidney failure. You should have your blood pressure rechecked by a primary care provider within the next 4 weeks.  If your provider in the emergency department today gave you specific instructions to follow-up with your doctor or provider even sooner than that, you should follow that instruction and not wait for up to 4 weeks for your follow-up visit.        Thank you for choosing [facility]       Thank you for choosing [facility] for your care. Our goal is always to provide you with excellent care. Hearing back from our patients is one way we can continue to improve our services. Please take a few minutes to complete the written survey  "that you may receive in the mail after you visit with us. Thank you!        [employer] Information     [employer] lets you send messages to your doctor, view your test results, renew your prescriptions, schedule appointments and more. To sign up, go to [email or web address] . Click on \"Log in\" on the left side of the screen, which will take you to the Welcome page. Then click on \"Sign up Now\" on the right side of the page.     You will be asked to enter the access code listed below, as well as some personal information. Please follow the directions to create your username and password.     Your access code is: [email or web address]  Expires: 2018 12:29 PM     Your access code will  in 90 days. If you need help or a new code, please call your [facility] clinic or [phone].        Care EveryWhere ID     This is your Care EveryWhere ID. This could be used by other organizations to access your [facility] medical records  [id number]        Equal Access to Services     MORELIA MON[place]NA : Hadii farzad mcleano Sogibson, waaxda luqadaha, qaybta kaalmada adealyson, lobito hernández . So [facility] [phone].    ATENCIÓN: Si habla español, tiene a de dios disposición servicios gratuitos de asistencia lingüística. Llame al [phone].    We comply with applicable federal civil rights laws and [place] laws. We do not discriminate on the basis of race, color, national origin, age, disability, sex, sexual orientation, or gender identity.            After Visit Summary       This is your record. Keep this with you and show to your community pharmacist(s) and doctor(s) at your next visit.                  "

## 2018-09-25 NOTE — ED PROVIDER NOTES
History     Chief Complaint:  Abdominal pain    HPI   Rocio Elkins is a 32 year old  female who presents with concern for suprapubic abdominal pain. The patient reports that last night she noted midline low abdominal pain which has worsened today and developed associated nausea and emesis, prompting her to present to the emergency department. She notes that she took an at home pregnancy test that resulted positive last week, and that her last pregnancy began just over one month ago. She denies vaginal bleeding, vaginal discharge, urinary symptoms, constipation, diarrhea, taking analgesics, or other concerns here in the ER today. She does not know her blood type.    Allergies:  Pseudoephedrine    Medications:    Vitamin D  Levothyroxine  Azo     Past Medical History:    Thyrotoxic exophthalmos  Hypovitaminosis D  Latent tuberculosis   Hypothyroidism    Past Surgical History:    The patient does not have any pertinent past surgical history.    Family History:    No past pertinent family history.    Social History:  Marital Status:   [2]  Negative for tobacco use.  Negative for alcohol use.    Review of Systems   Gastrointestinal: Positive for abdominal pain, nausea and vomiting. Negative for constipation and diarrhea.   Genitourinary: Negative for dysuria, hematuria and vaginal bleeding.   All other systems reviewed and are negative.    Physical Exam     Patient Vitals for the past 24 hrs:   BP Temp Temp src Pulse Heart Rate Resp SpO2 Height Weight   18 1631 - - - - - 14 100 % - -   18 1515 - - - - - - 100 % - -   18 1500 104/72 - - - - - 100 % - -   18 1440 102/68 - - - 82 16 100 % - -   18 1430 - - - - - - 100 % - -   18 1420 101/68 - - - 78 20 100 % - -   18 1415 - - - - - - 100 % - -   18 1412 (!) 110/31 - - - 90 20 - - -   18 1315 - - - - - - 100 % - -   18 1310 - - - - - - 100 % - -   18 1303 - - - - - - 100 % - -   18 1301  "102/66 - - - 83 20 100 % - -   09/25/18 1300 - - - - - - 100 % - -   09/25/18 1255 - - - - - - 100 % - -   09/25/18 1244 102/55 97.7  F (36.5  C) Oral 96 - 20 98 % 1.702 m (5' 7\") 64 kg (141 lb)     Physical Exam  Nursing notes reviewed. Vitals reviewed.  General: Alert. Well kept.  Eyes:  Conjunctiva non-injected, non-icteric.  Neck/Throat: Moist mucous membranes. Normal voice.  Cardiac: Regular rhythm. Normal heart sounds with no murmur/rubs/click.   Pulmonary: Clear and equal breath sounds bilaterally. No crackles/rales. No wheezing  Abdomen: Soft. Non-distended. Very mild suprapubic tenderness without focal LLQ or RLQ pain.  No upper abdominal tenderness.   Musculoskeletal: Normal gross range of motion of all 4 extremities.    Neurological: Alert and oriented x4.   Skin: Warm and dry without rashes or petechiae. Normal appearance of visualized exposed skin.  Psych: Affect normal. Good eye contact.    Emergency Department Course     Imaging:  Radiographic findings were communicated with the patient who voiced understanding of the findings.    US OB, <14 w Transvaginal:  Single living intrauterine pregnancy. No acute process  demonstrated. as per radiology.     Laboratory:  CBC: WBC: 11.1 (H), HGB: 14.5, PLT: 225  BMP: BUN: 6 (L), o/w WNL (Creatinine: 0.62)  HCG blood: positive  ABO/RH type and screen: O pos    UA with Microscopic: urineketon: 80 (A), pH urine: 8.0 (H), Mucous: present (A), o/w WNL    Rho (D): Order received  Rh Immune Globulin Study: O pos    Interventions:    1310 Zofran, 4 mg, IV injection    Emergency Department Course:  Nursing notes and vitals reviewed. (2229) I performed an exam of the patient as documented above.     IV inserted. Medicine administered as documented above. Blood drawn. This was sent to the lab for further testing, results above.    The patient was sent for a US OB while in the emergency department, findings above.     1530 Reassessment.  Patient states her pain is resolved. "  I had a detailed discussion of need for pelvic examination and patient declines understanding risks versus benefits and is competent and capable of making this decision.     The patient provided a urine sample here in the emergency department. This was sent for laboratory testing, findings above.     (1521) I rechecked the patient and discussed the results of her workup thus far.     (1618) I rechecked the patient and discussed her care plan going forward.  Reassessment.  No RLQ or LLQ tenderness.  States suprapubic tenderness is 1/10 and much improved.     Findings and plan explained to the Patient. Patient discharged home with instructions regarding supportive care, medications, and reasons to return. The importance of close follow-up was reviewed.     I personally reviewed the laboratory results with the Patient and answered all related questions prior to discharge.     Impression & Plan      Medical Decision Making:  Rocio Elkins is a 32 year old female who presents for suprapubic abdominal pain with positive home pregnancy test. Differential diagnosis is broad and includes ovarian torsion, ovarian cyst, ectopic pregnancy, urinary tract infection, pyelonephritis, intraabdominal etiologies such as appendicitis, or diverticulitis. On examination she has mild suprapubic tenderness but she has no focal left lower quadrant or right lower quadrant tenderness. She has no CVA tenderness. CBC and BMP today show mild leukocytosis of 11.1 with a normal kidney function. [place] [address] and ultrasound today reveals single live intrauterine pregnancy with a cardiac activity at 151 BPM. There is no subchorionic hemorrhage and left and right ovaries are without abnormalities and without adnexal mass. She is AB positive and has no vaginal bleeding. Her urine is without infection. I discussed a pelvic exam to complete exam today, and patient denies vaginal discharge and declines pelvic examination today. With no vaginal  bleeding and resolution of her pain I feel that this is appropriate, however I did discuss her examination is incomplete today. She will follow up with OB in two days and return to the ED with any worsening or change in her symptoms. Her symptoms today are not consistent with appendicitis as she has no right lower quadrant tenderness or diverticulitis with no LLQ tenderness, but I did discuss the sometimes vague nature of intraabdominal disease and that is her pain returns, localizes to RLQ, worsens, vomits, or gets fevers she must immediately return to the ED. Thus, she is discharged home for follow up with OB in two days.    Diagnosis:    ICD-10-CM    1. Abdominal pain, suprapubic R10.2    2. Less than 8 weeks gestation of pregnancy Z3A.01      Disposition:  discharged to home    Scribe Disclosure:  I, Roberto Bosch, am serving as a scribe on 9/25/2018 at 1:02 PM to personally document services performed by Vidhya Duke CNP based on my observations and the provider's statements to me.       Roberto Bosch  9/25/2018    EMERGENCY DEPARTMENT       Vidhya Duke CNP  09/25/18 0158

## 2018-09-25 NOTE — ED AVS SNAPSHOT
Emergency Department    [address] [place] [id number]    Phone:  [phone]    Fax:  [phone]                                       Rocio Elkins   MRN: [id number]    Department:   Emergency Department   Date of Visit:  9/25/2018           After Visit Summary Signature Page     I have received my discharge instructions, and my questions have been answered. I have discussed any challenges I see with this plan with the nurse or doctor.    ..........................................................................................................................................  Patient/Patient Representative Signature      ..........................................................................................................................................  Patient Representative Print Name and Relationship to Patient    ..................................................               ................................................  Date                                   Time    ..........................................................................................................................................  Reviewed by Signature/Title    ...................................................              ..............................................  Date                                               Time          22EPIC Rev 08/18

## 2018-09-25 NOTE — DISCHARGE INSTRUCTIONS
Pelvic Pain, Uncertain Cause    Pelvic pain is pain felt in the lowest part of the belly (abdomen) and between the hipbones. The pain may occur suddenly and recently (acute). Or the pain may last for 6 months or longer (chronic).  There are many possible causes of pelvic pain. The pain may be due to a problem in the female reproductive system. Or, it may be due to a problem in the digestive, urinary, or musculoskeletal systems.  Based on your visit today, the exact cause of your pelvic pain is not certain. Your condition does not appear to be serious at this time. But it is important for you to keep watching for any new symptoms or worsening of your condition.  General care  Your healthcare provider may advise a number of ways to help manage your pain. These can include:    Taking over-the-counter pain medicine. Stronger pain medicine may also be prescribed, if needed.    Applying heat to the pelvic area. Use a heating pad or a hot pack. Taking a hot bath may also help.    Getting plenty of rest.    Making certain lifestyle changes. These can include practicing good posture and getting regular exercise. Studies have shown that these changes help reduce pelvic pain in some women.    Seeing a physical therapist or pain specialist. These healthcare providers can discuss other ways to manage pain with you.  Follow-up care  Follow up with your healthcare provider, or as advised.   When to seek medical advice  Call your healthcare provider right away if any of the following occur:    Fever of 100.4 F or higher, or as directed by your healthcare provider    Pain worsens or you have sudden, severe pain or new pain    Nausea, vomiting, sweating, or restlessness    Dizziness or fainting    Unusual vaginal discharge    Abnormal vaginal bleeding (especially bleeding after menopause)  Date Last Reviewed: 10/1/2017    3289-7333 [employer]. [address] [place] [address], [place] [address]. All rights reserved. This  "information is not intended as a substitute for professional medical care. Always follow your healthcare professional's instructions.          Pregnancy: Your First Trimester Changes  The first trimester is a time of rapid development for your baby. Because your baby is growing so quickly, it is important that you start a healthy lifestyle right away. By the end of the first trimester, your baby has formed all of its major body organs and weighs just over an ounce.     Actual size of baby is 1/4\"    Month 1 (weeks 1 to 4)  The placenta (the organ that nourishes your baby) begins to form. The brain, spinal cord, heart, gastrointestinal tract, and lungs begin to develop. Your baby is about 1/4-inch long by the end of the first month.     Actual size of baby is 1\"      Month 2 (weeks 5 to 8)  All of your baby s major body organs form. The face, fingers, toes, ears, and eyes appear. By the end of the month, your baby is about 1-inch long.     Actual size of baby is 3\"      Month 3 (weeks 9 to 12)  Your baby can open and close its fists and mouth. The sexual organs begin to form. As the first trimester ends, your baby is about 3-inches long.  Date Last Reviewed: 10/1/2017    7063-0744 [employer]. [address] [place] [address], [place] [address]. All rights reserved. This information is not intended as a substitute for professional medical care. Always follow your healthcare professional's instructions.        "

## 2018-09-27 ENCOUNTER — OFFICE VISIT (OUTPATIENT)
Dept: MIDWIFE SERVICES | Facility: CLINIC | Age: 33
End: 2018-09-27
Payer: COMMERCIAL

## 2018-09-27 VITALS
HEIGHT: 67 IN | DIASTOLIC BLOOD PRESSURE: 68 MMHG | SYSTOLIC BLOOD PRESSURE: 118 MMHG | BODY MASS INDEX: 22.29 KG/M2 | WEIGHT: 142 LBS

## 2018-09-27 DIAGNOSIS — O21.9 NAUSEA/VOMITING IN PREGNANCY: ICD-10-CM

## 2018-09-27 DIAGNOSIS — Z34.90 EARLY STAGE OF PREGNANCY: Primary | ICD-10-CM

## 2018-09-27 PROCEDURE — 99203 OFFICE O/P NEW LOW 30 MIN: CPT | Performed by: ADVANCED PRACTICE MIDWIFE

## 2018-09-27 RX ORDER — ONDANSETRON 4 MG/1
4-8 TABLET, ORALLY DISINTEGRATING ORAL EVERY 8 HOURS PRN
Qty: 20 TABLET | Refills: 1 | Status: SHIPPED | OUTPATIENT
Start: 2018-09-27 | End: 2018-10-26

## 2018-09-27 ASSESSMENT — ANXIETY QUESTIONNAIRES
IF YOU CHECKED OFF ANY PROBLEMS ON THIS QUESTIONNAIRE, HOW DIFFICULT HAVE THESE PROBLEMS MADE IT FOR YOU TO DO YOUR WORK, TAKE CARE OF THINGS AT HOME, OR GET ALONG WITH OTHER PEOPLE: NOT DIFFICULT AT ALL
5. BEING SO RESTLESS THAT IT IS HARD TO SIT STILL: NOT AT ALL
7. FEELING AFRAID AS IF SOMETHING AWFUL MIGHT HAPPEN: NOT AT ALL
GAD7 TOTAL SCORE: 0
6. BECOMING EASILY ANNOYED OR IRRITABLE: NOT AT ALL
2. NOT BEING ABLE TO STOP OR CONTROL WORRYING: NOT AT ALL
3. WORRYING TOO MUCH ABOUT DIFFERENT THINGS: NOT AT ALL
1. FEELING NERVOUS, ANXIOUS, OR ON EDGE: NOT AT ALL

## 2018-09-27 ASSESSMENT — PATIENT HEALTH QUESTIONNAIRE - PHQ9: 5. POOR APPETITE OR OVEREATING: NOT AT ALL

## 2018-09-27 NOTE — PROGRESS NOTES
SUBJECTIVE:                                                   Rocio Elkins is a 32 year old who presents to clinic today for the following health issue(s):  Patient presents with:  RECHECK: follow up to ER visit, +UPT at home and in ER, abdominal pain      HPI:  Trying for pregnancy. Found out she was pregnant 1 week ago, then started to have cramping and nausea/vomitting. Went to ED. Ultrasound confirmed pregnancy.     Abdominal pain still present. Constant nausea with vomiting once per day. No bleeding.    Patient's last menstrual period was 2018 (exact date).  Patient is sexually active  .  Using none for contraception.   Health maintenance updated:  yes  STI infx testing offered:  Declined    Last PHQ-9 score on record =   PHQ-9 SCORE 2018   Total Score 7     Last GAD7 score on record =   JG-7 SCORE 2018   Total Score 0     Alcohol Score = 0    Problem list and histories reviewed & adjusted, as indicated.  Additional history: as documented.    Patient Active Problem List   Diagnosis     Acute pain of left shoulder     Acute bilateral thoracic back pain     LTBI (latent tuberculosis infection)     Other postablative hypothyroidism     Thyrotoxic exophthalmos     Hypovitaminosis D     Past Surgical History:   Procedure Laterality Date     NO HISTORY OF SURGERY        Social History   Substance Use Topics     Smoking status: Never Smoker     Smokeless tobacco: Never Used     Alcohol use No           Current Outpatient Prescriptions   Medication Sig     cholecalciferol (VITAMIN D3) 1000 UNIT tablet Take 1,000 Units by mouth     ciprofloxacin (CIPRO) 500 MG tablet Take 1 tablet (500 mg) by mouth 2 times daily     ibuprofen (ADVIL/MOTRIN) 600 MG tablet      levothyroxine (SYNTHROID/LEVOTHROID) 100 MCG tablet TK 1 T PO D     ondansetron (ZOFRAN ODT) 4 MG ODT tab Take 1-2 tablets (4-8 mg) by mouth every 8 hours as needed for nausea     phenazopyridine (AZO) 97.5 MG tablet Take 2 tablets (195  "mg) by mouth 3 times daily     Prenatal Vit-Fe Fumarate-FA (PREPLUS) 27-1 MG TABS TK 1 T PO DAILY     VITAMIN D3 1000 UNITS tablet TK 1 T PO D     No current facility-administered medications for this visit.      Allergies   Allergen Reactions     Pseudoephedrine      Other reaction(s): Other, see comments  Dizziness, leg weakness       ROS:  Constitutional: Fatigue and Loss of Appetite    OBJECTIVE:     /68  Ht 5' 7\" (1.702 m)  Wt 142 lb (64.4 kg)  LMP 2018 (Exact Date)  Breastfeeding? No  BMI 22.24 kg/m2  Body mass index is 22.24 kg/(m^2).    PHYSICAL EXAM:  Constitutional:  Appearance: Well nourished, well developed alert, in no acute distress. Visibly tired and nauseous.   Chest:  Respiratory Effort:  Breathing unlabored  Neurologic/Psychiatric:  Mental Status:  Oriented X3      In-Clinic Test Results:  No results found for this or any previous visit (from the past 24 hour(s)).    ASSESSMENT/PLAN:                                                        ICD-10-CM    1. Early stage of pregnancy Z34.90 ondansetron (ZOFRAN ODT) 4 MG ODT tab       COUNSELIN) Medication sheet given. Discussed using B6/Unisome. If it doesn't work, discussed using Zofran. Rx sent.  2) If fluids cannot be kept down, then go to ED for IV fluids. Also will call us and we will place order for IV infusion bolus.   3) Dating ultrasound is complete (from ED). New ob appointment next week. We will check on nausea then.    Jevon Montero, DNP, APRN, CNM    "

## 2018-09-28 ASSESSMENT — ANXIETY QUESTIONNAIRES: GAD7 TOTAL SCORE: 0

## 2018-09-28 ASSESSMENT — PATIENT HEALTH QUESTIONNAIRE - PHQ9: SUM OF ALL RESPONSES TO PHQ QUESTIONS 1-9: 7

## 2018-10-01 ENCOUNTER — TELEPHONE (OUTPATIENT)
Dept: OBGYN | Facility: CLINIC | Age: 33
End: 2018-10-01

## 2018-10-01 ENCOUNTER — TELEPHONE (OUTPATIENT)
Dept: NURSING | Facility: CLINIC | Age: 33
End: 2018-10-01

## 2018-10-01 NOTE — TELEPHONE ENCOUNTER
"Pt calling informing us that her employer is sending a fax for [employer] forms to be completed by the midwives.   She cannot go to work with due to N/V with early pregnancy. She sees the midwives 10/4/18.    Pt breathing heavy into the phone and speaking very quietly. When asked if she is taking in fluids she says she is still trying. She states it is difficult to take the zofran but will try with the vit b6 she was recommended to take earlier. She states she is weak and just cannot work like this. \"Needs bedrest\".  Pt denies SOB or dizziness at this time. Instructed on how important it is to get fluids in and stay hydrated. If she becomes dizzy, SOB or cannot keep fluids in 24 hours needs to be seen at the emergency room.    Pt verbalized understanding and no further questions. FYI sent to the midwives to look for forms.  "

## 2018-10-01 NOTE — TELEPHONE ENCOUNTER
Called and left detailed vm (ok phi)  Informed pt to schedule an appointment in our office tomorrow for f/u of nausea but if she is unable to get po fluids in overnoc she needs to go to the ER for fluids/evaluation. Reiterated message again and encouraged to call for appt.

## 2018-10-01 NOTE — TELEPHONE ENCOUNTER
Noted.  Please let Rocio know that she should schedule an appointment for tomorrow for nausea follow up or be seen in the ED if she is unable to get fluids in overnight.     Rachel Sky, MADELINE, APRN, CNM

## 2018-10-01 NOTE — TELEPHONE ENCOUNTER
LMP 8/16/18. Pt is having problems with N&V. Pt wondering what she could take. Was seen in clinic on Friday with Jevon. Reviewed Jevon's recommendations. Pt does have Rx for Zofran but does not want to take too much. Has helped when she takes it. Pt denies dark color urine. Able to keep small amounts of fluids and solids down. Informed should try the Vit B6. Pt can also try ginger ale, popsicles or sherbet. Pt also has problems with constipation. Encouraged pt to keep trying to hydrate. Can take Colace if needed. Pt will vickey back if has further concerns. Has 1st OB this Thursday.

## 2018-10-04 ENCOUNTER — PRENATAL OFFICE VISIT (OUTPATIENT)
Dept: MIDWIFE SERVICES | Facility: CLINIC | Age: 33
End: 2018-10-04
Payer: COMMERCIAL

## 2018-10-04 VITALS
BODY MASS INDEX: 21.91 KG/M2 | DIASTOLIC BLOOD PRESSURE: 70 MMHG | SYSTOLIC BLOOD PRESSURE: 100 MMHG | HEIGHT: 67 IN | WEIGHT: 139.6 LBS

## 2018-10-04 DIAGNOSIS — O09.511 ELDERLY PRIMIGRAVIDA IN FIRST TRIMESTER: Primary | ICD-10-CM

## 2018-10-04 DIAGNOSIS — Z3A.01 7 WEEKS GESTATION OF PREGNANCY: ICD-10-CM

## 2018-10-04 DIAGNOSIS — E89.0 POSTABLATIVE HYPOTHYROIDISM: ICD-10-CM

## 2018-10-04 DIAGNOSIS — O21.9 NAUSEA/VOMITING IN PREGNANCY: ICD-10-CM

## 2018-10-04 PROBLEM — M54.6 ACUTE BILATERAL THORACIC BACK PAIN: Status: RESOLVED | Noted: 2018-01-18 | Resolved: 2018-10-04

## 2018-10-04 PROBLEM — Z34.00 SUPERVISION OF NORMAL FIRST PREGNANCY: Status: ACTIVE | Noted: 2018-10-04

## 2018-10-04 PROBLEM — M25.512 ACUTE PAIN OF LEFT SHOULDER: Status: RESOLVED | Noted: 2018-01-18 | Resolved: 2018-10-04

## 2018-10-04 PROBLEM — Z34.01 ENCOUNTER FOR SUPERVISION OF NORMAL FIRST PREGNANCY IN FIRST TRIMESTER: Status: ACTIVE | Noted: 2018-10-04

## 2018-10-04 LAB
ALBUMIN UR-MCNC: NEGATIVE MG/DL
APPEARANCE UR: ABNORMAL
BACTERIA #/AREA URNS HPF: ABNORMAL /HPF
BILIRUB UR QL STRIP: NEGATIVE
COLOR UR AUTO: YELLOW
GLUCOSE UR STRIP-MCNC: NEGATIVE MG/DL
HBA1C MFR BLD: 5.4 % (ref 0–5.6)
HGB UR QL STRIP: NEGATIVE
KETONES UR STRIP-MCNC: NEGATIVE MG/DL
LEUKOCYTE ESTERASE UR QL STRIP: NEGATIVE
NITRATE UR QL: NEGATIVE
NON-SQ EPI CELLS #/AREA URNS LPF: ABNORMAL /LPF
PH UR STRIP: 7.5 PH (ref 5–7)
RBC #/AREA URNS AUTO: ABNORMAL /HPF
SOURCE: ABNORMAL
SP GR UR STRIP: 1.02 (ref 1–1.03)
UROBILINOGEN UR STRIP-ACNC: 1 EU/DL (ref 0.2–1)
WBC #/AREA URNS AUTO: ABNORMAL /HPF

## 2018-10-04 PROCEDURE — 83036 HEMOGLOBIN GLYCOSYLATED A1C: CPT | Performed by: ADVANCED PRACTICE MIDWIFE

## 2018-10-04 PROCEDURE — 36415 COLL VENOUS BLD VENIPUNCTURE: CPT | Performed by: ADVANCED PRACTICE MIDWIFE

## 2018-10-04 PROCEDURE — 81001 URINALYSIS AUTO W/SCOPE: CPT | Performed by: ADVANCED PRACTICE MIDWIFE

## 2018-10-04 PROCEDURE — 87389 HIV-1 AG W/HIV-1&-2 AB AG IA: CPT | Performed by: ADVANCED PRACTICE MIDWIFE

## 2018-10-04 PROCEDURE — 87340 HEPATITIS B SURFACE AG IA: CPT | Performed by: ADVANCED PRACTICE MIDWIFE

## 2018-10-04 PROCEDURE — 84439 ASSAY OF FREE THYROXINE: CPT | Performed by: ADVANCED PRACTICE MIDWIFE

## 2018-10-04 PROCEDURE — 99207 ZZC FIRST OB VISIT: CPT | Performed by: ADVANCED PRACTICE MIDWIFE

## 2018-10-04 PROCEDURE — 86762 RUBELLA ANTIBODY: CPT | Performed by: ADVANCED PRACTICE MIDWIFE

## 2018-10-04 PROCEDURE — 86787 VARICELLA-ZOSTER ANTIBODY: CPT | Performed by: ADVANCED PRACTICE MIDWIFE

## 2018-10-04 PROCEDURE — 84443 ASSAY THYROID STIM HORMONE: CPT | Performed by: ADVANCED PRACTICE MIDWIFE

## 2018-10-04 PROCEDURE — 82306 VITAMIN D 25 HYDROXY: CPT | Performed by: ADVANCED PRACTICE MIDWIFE

## 2018-10-04 PROCEDURE — 87086 URINE CULTURE/COLONY COUNT: CPT | Performed by: ADVANCED PRACTICE MIDWIFE

## 2018-10-04 ASSESSMENT — ANXIETY QUESTIONNAIRES

## 2018-10-04 ASSESSMENT — PATIENT HEALTH QUESTIONNAIRE - PHQ9: 5. POOR APPETITE OR OVEREATING: NOT AT ALL

## 2018-10-04 NOTE — PATIENT INSTRUCTIONS
Thank you for coming to see the Midwives at the   [facility]!      We will notify you about your labs that were drawn today once we get the results back.  If you have B[employer] Gameshart your lab results will be posted there.      Someone from the clinic will call you personally with results that require further discussion.      If you need any refills of medications please call your pharmacy and they will contact us.      If you have a medical emergency please call 911.      If you have any concerns about today's visit, wish to schedule another appointment, or have an urgent medical concern please call our office at [phone]. You can also make appointments through StrongLoop.      After hours you may also call the clinic number above to be connected with [place]'s after hours triage nurse.  The nurse can page the midwife on call if needed. There is always a midwife on call 24 hours a day.    Prenatal Care Recommendations:    Before 14 weeks: Dating ultrasound, genetic testing       This ultrasound helps us determine your dates accurately. Verifi (genetic screening test) can be drawn anytime after 10 weeks of gestation.    16 weeks: Optional genetic testing single AFP       This testing helps understand your baby's risk for some genetic abnormalities.    18-22 weeks:  Screening anatomy ultrasound       This testing will look for early growth abnormalities, placenta location, and may tell the baby's gender if you wish to find out.    24-27 weeks: One hour diabetes test (GCT) and complete blood count       This test helps identify diabetes of pregnancy or gestational diabetes.  We also look   at the iron in your blood and how well your blood clots.    28 - 36 weeks: Tetanus shot (Tdap)       This shot helps protect you and your baby from whooping cough.    36 weeks and later: Group B Strep test (GBS)       This test helps predict if you need antibiotics in labor to prevent infection for your baby.    Anytime  September to April:  Flu shot       This shot helps protect you and your family from the flu.  This is especially important during pregnancy        The typical schedule after your first visit today you can expect:     Visit 2 - 12-16 weeks  Visit 3 - 20 weeks  Visit 4 - 24 weeks  Visit 5 - 28 weeks  Visit 6 - 32 weeks  Visit 7 - 34 weeks  Visit 8 - 36 weeks  Weekly after 36 weeks until delivery.        Any time during or after your pregnancy you may experience increased depression and/or mood changes.    We are here to support you. Please contact us if you are:    Feeling anxious    Overwhelmed or sad     Trouble sleeping    Crying uncontrollably    Trouble caring for yourself or baby.    Any thoughts of hurting yourself, your baby, or anyone else    If anything comes up between your visits or you have concerns please don't hesitate to contact us.    Secure access to your medical record:  Use Sumavisos (secure email communication and access to your chart) to send your primary care provider a message or make an appointment. Ask someone on your Team how to sign up for Sumavisos. To log on to [employer] or for more information in Sumavisos please visit the website at [email or web address].       Certified Nurse Midwife (CNM) Team    Love NELSON, BARTOLO Sky DNP, OMAR, BARTOLO NELSON, BARTOLO Pérez, OMAR, CNM, [facility]  Jevon Montero, MADELINE, APRN, CNM      Again, thank you for choosing the midwives at [facility].  We are excited to be a part of your pregnancy. Please let us know how we can best partner with you to improve your and your family's health.    Fish Safety During Pregnancy    Often time's women worry about eating fish during pregnancy. Fish can be totally safe to eat during pregnancy.However, some fish may contain contaminants that could harm you or your baby if you eat certain types of fish or eat fish too often. Below is a list of which types of fish to eat, how often  to eat fish, and which types of fish to avoid while pregnant.    Reasons to eat fish:    Fish is a great source of protein, vitamins, and minerals.    The oils found in fish are important to unborn and breast fed babies    Eating fish may play a role in the prevention of heart disease in adults       Fish to EAT while pregnant   2 servings per week of any of these types of fish    Catfish (farm raised)  Cod    Crab    Singletary    Flatfish   Oysters    [place]   [place]     Sardines   Scallops    Shrimp   Tilapia   1 serving per week of any of these fish    Canned LIGHT tuna     [place] caught-        [place]    [place]   [place]    Yellow Perch   1 serving per MONTH of these types of fish    Canned WHITE tuna  [place]    Grouper   Halibut    [place]    [place] Roughy    Tuna steak   [place] caught-    Bass    Catfish    Walleye smaller than 20 inches    [place] smaller than 30 inches         Servings of fish should be based on your weight, 1 oz for every 20 lbs of body weight. For example: 130 lb person can safely eat 7 oz     150 lb person can safely eat 8 oz     170 lb person can safely eat 9 oz      Make sure to space out meals with fish throughout the month, don't eat all your fish meals for the month within a few days.       Fish to Avoid while Pregnant:      Shark   Arnel Mackerel    Swordfish  Raw Sushi-any type of fish    Tile fish         [place] Caught:      Walleye longer than 20 in    [place] longer than 30 in    Musky      Contaminants:      Mercury comes from air pollution and small amounts of mercury can damage the brain that is just starting to form or grow. Too much mercury may affect a child's behavior and lead to learning problems later in life. Too much mercury in adults and older children may cause tingling, numbness in hands and feet, or vision changes    PCBs a man-made substance once used in electrical transformers but was banned in 1967 although it can  still be found in the [place] and the [facility]. A baby who are exposed to PCBs during pregnancy may have a lower birth weight, reduced head size, and delayed physical development. Exposure to PCBs may also cause cancer    PFOS is a man-made chemical to make products that resist heat, oil, stains, and grease. Studies in lab animals exposed to low levels of PFOS showed decreas HDL (good cholesterol) and changes in thyroid hormone levels. The concern about PFOS is with long-term exposure such as consuming large amounts over a long period of time     Mercury and PFOS cannot be removed through cooking or cleaning. By removing fat when cleaning and cooking you can reduce PCBs.      For more information and up to date information about fish safety in [place] please contact the [place] Department of Health ([facility]) or the Department of Natural Resources (DNR):    www.[email or web address].  DNR: [phone]  [facility]: [phone]            Remedies for nausea and vomiting with pregnancy      Eat small frequent meals every 2-3 hours if possible.       Avoid food at extremes of temperature and drinks with carbonation.      Eat foods that appeal to you, avoiding fats and spicy foods.      Avoid liquids with foods.  Drink liquids 30-60 minutes before or after eating and sip slowly.      Bread, pasta, crackers, potatoes, and rice tend to be tolerated the best.      Don't worry about what you eat in the first 3 months, it is more important that you can eat and keep it down.       Try flat ginger ale or ginger tea      Before rising in the morning, eat a small amount of crackers or dry toast.      Peppermint tea      Ginger is a herbal remedy for nausea and you can use it in any form.  There are ginger tablets you can purchase.  The dose 1000 mg a day in divided doses.       You may also try doxylamine (Unisom) 12.5 mg three times a day which is a sleeping medication along with Vitamin B6 25 mg three times a day.  This  combination takes up to a week to work so give it some time.       Benadryl (diphenhydramine) 25-50 mg every 8 hour or Dramamine (dimenhydrinate)  mg by mouth every 4-6 hours. Both of these medication may cause some drowsiness      Other things that may help include an Accupressure band and acupuncture      If these methods fail there are many prescriptions that we can try    If you begin to vomit more than 5 or 6 times a day and feel that you are unable to keep anything down, call the [facility] at [phone]

## 2018-10-04 NOTE — MR AVS SNAPSHOT
After Visit Summary   10/4/2018    Rocio Elkins    MRN: [id number]           Patient Information     Date Of Birth          1985        Visit Information        Provider Department      10/4/2018 2:00 PM Jevon Montero APRN CNM; [facility]        Today's Diagnoses     Encounter for supervision of normal first pregnancy in first trimester    -  1    Postablative hypothyroidism          Care Instructions    [facility]      We will notify you about your labs that were drawn today once we get the results back.  If you have Jump or Fallhart your lab results will be posted there.      Someone from the clinic will call you personally with results that require further discussion.      If you need any refills of medications please call your pharmacy and they will contact us.      If you have a medical emergency please call 911.      If you have any concerns about today's visit, wish to schedule another appointment, or have an urgent medical concern please call our office at [phone]. You can also make appointments through WhoJam.      After hours you may also call the clinic number above to be connected with [place]'s after hours triage nurse.  The nurse can page the midwife on call if needed. There is always a midwife on call 24 hours a day.    Prenatal Care Recommendations:    Before 14 weeks: Dating ultrasound, genetic testing       This ultrasound helps us determine your dates accurately. Verifi (genetic screening test) can be drawn anytime after 10 weeks of gestation.    16 weeks: Optional genetic testing single AFP       This testing helps understand your baby's risk for some genetic abnormalities.    18-22 weeks:  Screening anatomy ultrasound       This testing will look for early growth abnormalities, placenta location, and may tell the baby's gender if you wish to find out.    24-27 weeks: One  hour diabetes test (GCT) and complete blood count       This test helps identify diabetes of pregnancy or gestational diabetes.  We also look   at the iron in your blood and how well your blood clots.    28 - 36 weeks: Tetanus shot (Tdap)       This shot helps protect you and your baby from whooping cough.    36 weeks and later: Group B Strep test (GBS)       This test helps predict if you need antibiotics in labor to prevent infection for your baby.    Anytime September to April:  Flu shot       This shot helps protect you and your family from the flu.  This is especially important during pregnancy        The typical schedule after your first visit today you can expect:     Visit 2 - 12-16 weeks  Visit 3 - 20 weeks  Visit 4 - 24 weeks  Visit 5 - 28 weeks  Visit 6 - 32 weeks  Visit 7 - 34 weeks  Visit 8 - 36 weeks  Weekly after 36 weeks until delivery.        Any time during or after your pregnancy you may experience increased depression and/or mood changes.    We are here to support you. Please contact us if you are:    Feeling anxious    Overwhelmed or sad     Trouble sleeping    Crying uncontrollably    Trouble caring for yourself or baby.    Any thoughts of hurting yourself, your baby, or anyone else    If anything comes up between your visits or you have concerns please don't hesitate to contact us.    Secure access to your medical record:  Use [employer]xishart (secure email communication and access to your chart) to send your primary care provider a message or make an appointment. Ask someone on your Team how to sign up for EPS. To log on to [employer]Way or for more information in EPS please visit the website at [email or web address].       Certified Nurse Midwife (CNM) Team    Love NELSON, BARTOLO Sky DNP, APRN, KELLENM  Kendra NELSON, BARTOLO Pérez, OMAR, BARTOLO, [facility]-BC  Jevon Montero, MADELINE, APRN, BARTOLO      Again, thank you for choosing the midwives at [facility].  We are  excited to be a part of your pregnancy. Please let us know how we can best partner with you to improve your and your family's health.    Fish Safety During Pregnancy    Often time's women worry about eating fish during pregnancy. Fish can be totally safe to eat during pregnancy.However, some fish may contain contaminants that could harm you or your baby if you eat certain types of fish or eat fish too often. Below is a list of which types of fish to eat, how often to eat fish, and which types of fish to avoid while pregnant.    Reasons to eat fish:    Fish is a great source of protein, vitamins, and minerals.    The oils found in fish are important to unborn and breast fed babies    Eating fish may play a role in the prevention of heart disease in adults       Fish to EAT while pregnant   2 servings per week of any of these types of fish    Catfish (farm raised)  Cod    Crab    Singletary    Flatfish   Oysters    [place]   [place] ([place]     Sardines   Scallops    Shrimp   Tilapia   1 serving per week of any of these fish    Canned LIGHT tuna     [place] caught-        Vamsi Goodmantrinorae   [place]    Yellow Perch   1 serving per MONTH of these types of fish    Canned WHITE tuna  [place] Sea Rod[place]guez    Grouper   Halibut    [place]    [place] Roughy    Tuna steak   [place] caught-    Bass    Catfish    Walleye smaller than 20 inches    Northern [place] smaller than 30 inches         Servings of fish should be based on your weight, 1 oz for every 20 lbs of body weight. For example: 130 lb person can safely eat 7 oz     150 lb person can safely eat 8 oz     170 lb person can safely eat 9 oz      Make sure to space out meals with fish throughout the month, don't eat all your fish meals for the month within a few days.       Fish to Avoid while Pregnant:      Shark   Arnel Mackerel    Swordfish  Raw Sushi-any type of fish    Tile fish         [place] Caught:      Walleye longer than 20 in    [place] longer than 30  in    Musky      Contaminants:      Mercury comes from air pollution and small amounts of mercury can damage the brain that is just starting to form or grow. Too much mercury may affect a child's behavior and lead to learning problems later in life. Too much mercury in adults and older children may cause tingling, numbness in hands and feet, or vision changes    PCBs a man-made substance once used in electrical transformers but was banned in 1967 although it can still be found in the [place]. A baby who are exposed to PCBs during pregnancy may have a lower birth weight, reduced head size, and delayed physical development. Exposure to PCBs may also cause cancer    PFOS is a man-made chemical to make products that resist heat, oil, stains, and grease. Studies in lab animals exposed to low levels of PFOS showed decreas HDL (good cholesterol) and changes in thyroid hormone levels. The concern about PFOS is with long-term exposure such as consuming large amounts over a long period of time     Mercury and PFOS cannot be removed through cooking or cleaning. By removing fat when cleaning and cooking you can reduce PCBs.      For more information and up to date information about fish safety in [place] please contact the [place] Department of Health ([facility]) or the Department of Natural Resources (DNR):    www.health.[email or web address].mn.  DNR: [phone]  [facility]: [phone]            Remedies for nausea and vomiting with pregnancy      Eat small frequent meals every 2-3 hours if possible.       Avoid food at extremes of temperature and drinks with carbonation.      Eat foods that appeal to you, avoiding fats and spicy foods.      Avoid liquids with foods.  Drink liquids 30-60 minutes before or after eating and sip slowly.      Bread, pasta, crackers, potatoes, and rice tend to be tolerated the best.      Don't worry about what you eat in the first 3 months, it is more important that you can eat and  keep it down.       Try flat ginger ale or ginger tea      Before rising in the morning, eat a small amount of crackers or dry toast.      Peppermint tea      Ginger is a herbal remedy for nausea and you can use it in any form.  There are ginger tablets you can purchase.  The dose 1000 mg a day in divided doses.       You may also try doxylamine (Unisom) 12.5 mg three times a day which is a sleeping medication along with Vitamin B6 25 mg three times a day.  This combination takes up to a week to work so give it some time.       Benadryl (diphenhydramine) 25-50 mg every 8 hour or Dramamine (dimenhydrinate)  mg by mouth every 4-6 hours. Both of these medication may cause some drowsiness      Other things that may help include an Accupressure band and acupuncture      If these methods fail there are many prescriptions that we can try    If you begin to vomit more than 5 or 6 times a day and feel that you are unable to keep anything down, call the [facility] at [phone]            Follow-ups after your visit        Follow-up notes from your care team     Return in about 4 weeks (around 11/1/2018) for Prenatal Visit.      Your next 10 appointments already scheduled     Nov 01, 2018  9:10 AM CDT   ESTABLISHED PRENATAL with OMAR Gunter CNM   [facility]    [address] [place] [id number] [phone]              Who to contact     If you have questions or need follow up information about today's clinic visit or your schedule please contact [facility] directly at [phone].  Normal or non-critical lab and imaging results will be communicated to you by MyChart, letter or phone within 4 business days after the clinic has received the results. If you do not hear from us within 7 days, please contact the clinic through MyChart or phone. If you have a critical or abnormal lab result, we  "will notify you by phone as soon as possible.  Submit refill requests through [employer] or call your pharmacy and they will forward the refill request to us. Please allow 3 business days for your refill to be completed.          Additional Information About Your Visit        SideStripehart Information     [employer] lets you send messages to your doctor, view your test results, renew your prescriptions, schedule appointments and more. To sign up, go to [email or web address] . Click on \"Log in\" on the left side of the screen, which will take you to the Welcome page. Then click on \"Sign up Now\" on the right side of the page.     You will be asked to enter the access code listed below, as well as some personal information. Please follow the directions to create your username and password.     Your access code is: [email or web address]  Expires: 2018 12:29 PM     Your access code will  in 90 days. If you need help or a new code, please call your [place] clinic or [phone].        Care EveryWhere ID     This is your Care EveryWhere ID. This could be used by other organizations to access your [place] medical records  [id number]        Your Vitals Were     Height Last Period Breastfeeding? BMI (Body Mass Index)          5' 7\" (1.702 m) 2018 (Exact Date) No 21.86 kg/m2         Blood Pressure from Last 3 Encounters:   10/04/18 100/70   18 118/68   18 104/72    Weight from Last 3 Encounters:   10/04/18 139 lb 9.6 oz (63.3 kg)   18 142 lb (64.4 kg)   18 141 lb (64 kg)              We Performed the Following     Hemoglobin A1c     Hepatitis B surface antigen     HIV Antigen Antibody Combo     Rubella Antibody IgG Quantitative     TSH with free T4 reflex     UA with Microscopic     Urine Culture Aerobic Bacterial     Varicella Zoster Virus Antibody IgG     Vitamin D Deficiency        Primary Care Provider Fax #    Physician No Ref-Primary [phone]       No address on file        Equal Access to " Services     [facility]: Hadii aad ku hadkathyirena Aprilgibson, waricoda luqadaha, qaybta kaalmajames paul, lobito hernández . So [facility] [phone].    ATENCIÓN: Si habla lynn, tiene a de dios disposición servicios gratuitos de asistencia lingüística. Llame al [phone].    We comply with applicable federal civil rights laws and [place] laws. We do not discriminate on the basis of race, color, national origin, age, disability, sex, sexual orientation, or gender identity.            Thank you!     Thank you for choosing [facility]  for your care. Our goal is always to provide you with excellent care. Hearing back from our patients is one way we can continue to improve our services. Please take a few minutes to complete the written survey that you may receive in the mail after your visit with us. Thank you!             Your Updated Medication List - Protect others around you: Learn how to safely use, store and throw away your medicines at www.disposemymeds.org.          This list is accurate as of 10/4/18  3:19 PM.  Always use your most recent med list.                   Brand Name Dispense Instructions for use Diagnosis    * cholecalciferol 1000 UNIT tablet    vitamin D3     Take 1,000 Units by mouth        * cholecalciferol 1000 UNIT tablet    vitamin D3     TK 1 T PO D        levothyroxine 100 MCG tablet    SYNTHROID/LEVOTHROID     TK 1 T PO D        ondansetron 4 MG ODT tab    ZOFRAN ODT    20 tablet    Take 1-2 tablets (4-8 mg) by mouth every 8 hours as needed for nausea    Early stage of pregnancy       PREPLUS 27-1 MG Tabs      TK 1 T PO DAILY        * Notice:  This list has 2 medication(s) that are the same as other medications prescribed for you. Read the directions carefully, and ask your doctor or other care provider to review them with you.

## 2018-10-04 NOTE — PROGRESS NOTES
"Rocio Elkins is a 32 year old  ,  who is not a previous CNM patient. She presents for a new OB Visit. This was a planned pregnancy. She is employed outside the home.  Job profession is  for the [employer].   FOB/ is Francheska \"Dami\" who is in good health.  FOB IS actively involved in relationship and this pregnancy.  Job profession in school.    Patient and FOB currently do live together.    She has not had bleeding since her LMP.    She has had abdominal pain associated with nausea and vomiting since her LMP.  She has had nausea.  Has had vomiting. Seen last week for this after ED visit. Rx for Zofran was given at that visit. She reports she is not taking it regularly. She states it is frowned upon to take medications during pregnancy in her culture. She has not been to work since the previous Thursday due to N&V. She is asking for paperwork to be filled out to excuse her from work.  Any personal or family history of blood clots? No  History of sickle cell anemia or trait? No         Patient's last menstrual period was 2018 (exact date)..  Estimated Date of Delivery: May 23, 2019 Ultrasound consistent with LMP.    MENSTRUAL HISTORY    Last PAP:  17 at [facility]  History of abnormal Pap?  No  Next PAP due 22    Health maintenance updated:  yes        Current medications are:    Current Outpatient Prescriptions:      cholecalciferol (VITAMIN D3) 1000 UNIT tablet, Take 1,000 Units by mouth, Disp: , Rfl:      levothyroxine (SYNTHROID/LEVOTHROID) 100 MCG tablet, TK 1 T PO D, Disp: , Rfl: 1     ondansetron (ZOFRAN ODT) 4 MG ODT tab, Take 1-2 tablets (4-8 mg) by mouth every 8 hours as needed for nausea, Disp: 20 tablet, Rfl: 1     Prenatal Vit-Fe Fumarate-FA (PREPLUS) 27-1 MG TABS, TK 1 T PO DAILY, Disp: , Rfl: 1     VITAMIN D3 1000 UNITS tablet, TK 1 T PO D, Disp: , Rfl: 3     INFECTION HISTORY  HIV: No  Hepatitis B: No  Hepatitis C: " No  Tuberculosis: Latent TB; See problem list  Genital Herpes self: no  Herpes partner:  no  Chlamydia:  no  Gonorrhea:  no  HPV: No  BV:  No  Syphilis:  No  Chicken Pox:  Yes - as a child      OB HISTORY  Obstetric History       T0      L0     SAB0   TAB0   Ectopic0   Multiple0   Live Births0       # Outcome Date GA Lbr Mino/2nd Weight Sex Delivery Anes PTL Lv   1 Current                   PERSONAL HISTORY  Exercise Habits:  walking 1-2 days per week.  Employment: Full time.  Her job involves light activity with no potential for toxic exposure.    Travel plans:  are none planned.   Diet: eats regular meals and follows a balanced nutrition diet  Abuse concerns? No  Hgb A1c screen:  BMI > 30: No, 1st degree family DM: No, History of GDM: No, PCOS: No, High risk ethnicity: Yes    Social History     Social History     Marital status:      Spouse name: N/A     Number of children: 0     Years of education: N/A     Occupational History      [employer]     Social History Main Topics     Smoking status: Never Smoker     Smokeless tobacco: Never Used     Alcohol use No     Drug use: No     Sexual activity: Yes     Partners: Male     Birth control/ protection: None     Other Topics Concern     Not on file     Social History Narrative         She  reports that she has never smoked. She has never used smokeless tobacco.    STD testing offered?  Accepted  Last PHQ-9 score on record =   PHQ-9 SCORE 10/4/2018   Total Score 5     Last GAD7 score on record =   JG-7 SCORE 10/4/2018   Total Score 0     Alcohol Score = 0  Referral/Meds needed? Yes - Orders placed for IV hydration at the Infusion Center    PAST MEDICAL/SURGICAL HISTORY  Past Medical History:   Diagnosis Date     Thyroid disease      Past Surgical History:   Procedure Laterality Date     NO HISTORY OF SURGERY         FAMILY HISTORY  History reviewed. No pertinent family history.      ROS:  12 point review of systems negative other than  "symptoms noted below.  Constitutional: Fatigue and Loss of Appetite  Neurologic: Dizziness and Headaches    PHYSICAL EXAM  Vitals: /70  Ht 5' 7\" (1.702 m)  Wt 139 lb 9.6 oz (63.3 kg)  LMP 2018 (Exact Date)  Breastfeeding? No  BMI 21.86 kg/m2  BMI= Body mass index is 21.86 kg/(m^2).     GENERAL:  32 year old pleasant pregnant female, alert, cooperative and well groomed.  LUNGS:  Unlabored breathing.  LOWER EXTREMITIES: No edema. No significant varicosities.    ASSESSMENT/PLAN:    IUP at 7w4d    ICD-10-CM    1. Encounter for supervision of normal first pregnancy in first trimester Z34.01 Hepatitis B surface antigen     UA with Microscopic     Urine Culture Aerobic Bacterial     Rubella Antibody IgG Quantitative     HIV Antigen Antibody Combo     TSH with free T4 reflex     Varicella Zoster Virus Antibody IgG     Hemoglobin A1c     Vitamin D Deficiency     T4 free     T4 free   2. Postablative hypothyroidism E89.0 TSH with free T4 reflex   3. Nausea/vomiting in pregnancy O21.9         consult for US for AMA patients: NA  Genetic Testing reviewed and discussed, patient is unsure. Handout provided.    COUNSELING    Instructed on use of triage nurse line and contacting the on call CNM after hours in an emergency.     Symptoms of N&V and fatigue usually start to resolve around 12-16 weeks     Reviewed CNM philosophy, call schedule for labor and delivery, and FSH for delivery    1st OB handout given outlining appointment spacing and CNM information    Reviewed exercise and nutrition    Recommend to gain 25-35 pounds with her pregnancy.    Discussed OTC medications. OB med list given    Encouraged patient to take PNV's/DHA    Travel precautions discussed, no air travel after 36 weeks and Zika Virus discussed    Will call patient with lab results when available    Agreed to fill out paperwork for work absence. Discussed with her it would be from  through . Told her if she needed " to extend the time she would need to come in for a visit. Also explained that she would need to at least try IV hydration to see if it improved her N&V. She agreed.     Does patient desire a RN home visit from the [facility]?  Yes    If yes, paperwork completed?  Yes     F/U to be addressed next visit:    (1) GC/CT via urine  (2) Ask about genetic testing. Client unsure at this time.  (3) Hypothyroidism - Not taking medication d/t nausea. She is going to follow up with endo. Make sure she has.  (4) Getting IV hydration at Infusion Center.  (5) Please offer her [facility] referral for AMA and uncontrolled hypothyroidism    Will return to the clinic in 4 weeks for her next routine prenatal check.  Will call to be seen sooner if problems arise.    Jevon Montero, DNP, APRN, CNM

## 2018-10-04 NOTE — Clinical Note
Please abstract the following data from this visit with this patient into the appropriate field in Epic:  Pap smear done on this date: 5/19/17 (approximately), by this group: [facility], results were NILM and HPV negative.

## 2018-10-05 PROBLEM — O21.9 NAUSEA/VOMITING IN PREGNANCY: Status: ACTIVE | Noted: 2018-10-05

## 2018-10-05 LAB
BACTERIA SPEC CULT: NO GROWTH
DEPRECATED CALCIDIOL+CALCIFEROL SERPL-MC: 32 UG/L (ref 20–75)
HBV SURFACE AG SERPL QL IA: NONREACTIVE
HIV 1+2 AB+HIV1 P24 AG SERPL QL IA: NONREACTIVE
Lab: NORMAL
RUBV IGG SERPL IA-ACNC: 7 IU/ML
SPECIMEN SOURCE: NORMAL
T4 FREE SERPL-MCNC: 0.79 NG/DL (ref 0.76–1.46)
TSH SERPL DL<=0.005 MIU/L-ACNC: 15.71 MU/L (ref 0.4–4)
VZV IGG SER QL IA: 1.8 AI (ref 0–0.8)

## 2018-10-05 RX ORDER — DEXTROSE, SODIUM CHLORIDE, SODIUM LACTATE, POTASSIUM CHLORIDE, AND CALCIUM CHLORIDE 5; .6; .31; .03; .02 G/100ML; G/100ML; G/100ML; G/100ML; G/100ML
1000 INJECTION, SOLUTION INTRAVENOUS ONCE
Status: CANCELLED | OUTPATIENT
Start: 2018-10-08 | End: 2018-10-08

## 2018-10-05 RX ORDER — ONDANSETRON 2 MG/ML
4-8 INJECTION INTRAMUSCULAR; INTRAVENOUS EVERY 8 HOURS PRN
Status: CANCELLED | OUTPATIENT
Start: 2018-10-11 | End: 2018-11-25

## 2018-10-05 RX ORDER — ONDANSETRON 2 MG/ML
8 INJECTION INTRAMUSCULAR; INTRAVENOUS EVERY 8 HOURS PRN
Status: CANCELLED | OUTPATIENT
Start: 2018-10-11 | End: 2018-11-25

## 2018-10-05 ASSESSMENT — PATIENT HEALTH QUESTIONNAIRE - PHQ9: SUM OF ALL RESPONSES TO PHQ QUESTIONS 1-9: 5

## 2018-10-05 ASSESSMENT — ANXIETY QUESTIONNAIRES: GAD7 TOTAL SCORE: 0

## 2018-10-05 NOTE — PROGRESS NOTES
Please call Keon back to let her know that all her New OB labs are WNL except for her thyroid. I know she hasn't been taking her synthroid d/t nausea/vomiting. She needs to follow up with her endocrinologist asap for follow up and to find a solution.     Thanks,  Jevon

## 2018-10-08 ENCOUNTER — TELEPHONE (OUTPATIENT)
Dept: NURSING | Facility: CLINIC | Age: 33
End: 2018-10-08

## 2018-10-08 PROBLEM — O09.519 ADVANCED MATERNAL AGE, 1ST PREGNANCY: Status: ACTIVE | Noted: 2018-10-04

## 2018-10-08 NOTE — TELEPHONE ENCOUNTER
7w4d, ART 5/23/19. Pt was schedule for an appt with the infusion center today at 9 am for fluids. Pt is too ill to drive. Has not found anyone to drive her. Wanted to know if she could come late. Pt informed she could not. Pt states she is emptying her bladder but the urine is dark. Not able to keep fluids down. Pt advised to go to ED at [facility] to get fluids. Pt should find someone to take her or she could call 911. Pt should not drive herself. Routing to [facility] CHARIS.

## 2018-10-09 ENCOUNTER — TELEPHONE (OUTPATIENT)
Dept: OBGYN | Facility: CLINIC | Age: 33
End: 2018-10-09

## 2018-10-09 ENCOUNTER — INFUSION THERAPY VISIT (OUTPATIENT)
Dept: INFUSION THERAPY | Facility: CLINIC | Age: 33
End: 2018-10-09
Attending: ADVANCED PRACTICE MIDWIFE
Payer: COMMERCIAL

## 2018-10-09 VITALS
RESPIRATION RATE: 20 BRPM | TEMPERATURE: 97.6 F | SYSTOLIC BLOOD PRESSURE: 112 MMHG | OXYGEN SATURATION: 100 % | DIASTOLIC BLOOD PRESSURE: 59 MMHG | HEART RATE: 89 BPM

## 2018-10-09 DIAGNOSIS — O09.511 ELDERLY PRIMIGRAVIDA IN FIRST TRIMESTER: ICD-10-CM

## 2018-10-09 DIAGNOSIS — O21.9 NAUSEA/VOMITING IN PREGNANCY: Primary | ICD-10-CM

## 2018-10-09 PROCEDURE — 25000128 H RX IP 250 OP 636: Performed by: ADVANCED PRACTICE MIDWIFE

## 2018-10-09 PROCEDURE — 96361 HYDRATE IV INFUSION ADD-ON: CPT

## 2018-10-09 PROCEDURE — 96374 THER/PROPH/DIAG INJ IV PUSH: CPT

## 2018-10-09 RX ORDER — ONDANSETRON 2 MG/ML
8 INJECTION INTRAMUSCULAR; INTRAVENOUS EVERY 8 HOURS PRN
Status: DISCONTINUED | OUTPATIENT
Start: 2018-10-12 | End: 2018-10-09 | Stop reason: HOSPADM

## 2018-10-09 RX ORDER — ONDANSETRON 2 MG/ML
8 INJECTION INTRAMUSCULAR; INTRAVENOUS EVERY 8 HOURS PRN
Status: CANCELLED | OUTPATIENT
Start: 2018-10-12 | End: 2018-11-26

## 2018-10-09 RX ORDER — DEXTROSE, SODIUM CHLORIDE, SODIUM LACTATE, POTASSIUM CHLORIDE, AND CALCIUM CHLORIDE 5; .6; .31; .03; .02 G/100ML; G/100ML; G/100ML; G/100ML; G/100ML
1000 INJECTION, SOLUTION INTRAVENOUS ONCE
Status: COMPLETED | OUTPATIENT
Start: 2018-10-09 | End: 2018-10-09

## 2018-10-09 RX ORDER — DEXTROSE, SODIUM CHLORIDE, SODIUM LACTATE, POTASSIUM CHLORIDE, AND CALCIUM CHLORIDE 5; .6; .31; .03; .02 G/100ML; G/100ML; G/100ML; G/100ML; G/100ML
1000 INJECTION, SOLUTION INTRAVENOUS ONCE
Status: CANCELLED | OUTPATIENT
Start: 2018-10-09 | End: 2018-10-09

## 2018-10-09 RX ADMIN — SODIUM CHLORIDE, POTASSIUM CHLORIDE, SODIUM LACTATE AND CALCIUM CHLORIDE 1000 ML: 600; 310; 30; 20 INJECTION, SOLUTION INTRAVENOUS at 14:36

## 2018-10-09 RX ADMIN — ONDANSETRON 8 MG: 2 INJECTION INTRAMUSCULAR; INTRAVENOUS at 14:36

## 2018-10-09 RX ADMIN — SODIUM CHLORIDE, SODIUM LACTATE, POTASSIUM CHLORIDE, CALCIUM CHLORIDE AND DEXTROSE MONOHYDRATE 1000 ML: 5; 600; 310; 30; 20 INJECTION, SOLUTION INTRAVENOUS at 15:43

## 2018-10-09 ASSESSMENT — PAIN SCALES - GENERAL: PAINLEVEL: NO PAIN (0)

## 2018-10-09 NOTE — MR AVS SNAPSHOT
"              After Visit Summary   10/9/2018    Rocio Elkins    MRN: [id number]           Patient Information     Date Of Birth          1985        Visit Information        Provider Department      10/9/2018 2:30 PM  INFUSION CHAIR 4 [facility]        Today's Diagnoses     Nausea/vomiting in pregnancy    -  1    Elderly primigravida in first trimester           Follow-ups after your visit        Your next 10 appointments already scheduled     Nov 01, 2018  9:10 AM CDT   ESTABLISHED PRENATAL with OMAR Gunter CNM   [facility] [place] ([facility] [place])    [address] [id number] [phone]              Who to contact     If you have questions or need follow up information about today's clinic visit or your schedule please contact [facility] directly at [phone].  Normal or non-critical lab and imaging results will be communicated to you by mAPPnhart, letter or phone within 4 business days after the clinic has received the results. If you do not hear from us within 7 days, please contact the clinic through mAPPnhart or phone. If you have a critical or abnormal lab result, we will notify you by phone as soon as possible.  Submit refill requests through Agensys or call your pharmacy and they will forward the refill request to us. Please allow 3 business days for your refill to be completed.          Additional Information About Your Visit        MyChart Information     Agensys lets you send messages to your doctor, view your test results, renew your prescriptions, schedule appointments and more. To sign up, go to [email or web address] . Click on \"Log in\" on the left side of the screen, which will take you to the Welcome page. Then click on \"Sign up Now\" on the right side of the page.     You will be asked to enter the access code listed below, as well as some " personal information. Please follow the directions to create your username and password.     Your access code is: [email or web address]  Expires: 2018 12:29 PM     Your access code will  in 90 days. If you need help or a new code, please call your [place] clinic or [phone].        Care EveryWhere ID     This is your Care EveryWhere ID. This could be used by other organizations to access your [place] medical records  [id number]        Your Vitals Were     Pulse Temperature Respirations Last Period Pulse Oximetry       89 97.6  F (36.4  C) (Oral) 20 2018 (Exact Date) 100%        Blood Pressure from Last 3 Encounters:   10/09/18 112/59   10/04/18 100/70   18 118/68    Weight from Last 3 Encounters:   10/04/18 63.3 kg (139 lb 9.6 oz)   18 64.4 kg (142 lb)   18 64 kg (141 lb)              Today, you had the following     No orders found for display       Primary Care Provider Fax #    Physician No Ref-Primary [phone]       No address on file        Equal Access to Services     [facility]: Hadii farzad mcleano Sogibson, waaxda lufloyd, qaybta kaalmada humberto, lobito hernández . So [facility] [phone].    ATENCIÓN: Si habla español, tiene a de dios disposición servicios gratuitos de asistencia lingüística. Llame al [phone].    We comply with applicable federal civil rights laws and [place] laws. We do not discriminate on the basis of race, color, national origin, age, disability, sex, sexual orientation, or gender identity.            Thank you!     Thank you for choosing [facility] AND [facility]  for your care. Our goal is always to provide you with excellent care. Hearing back from our patients is one way we can continue to improve our services. Please take a few minutes to complete the written survey that you may receive in the mail after your visit with us. Thank you!             Your Updated Medication List - Protect others  around you: Learn how to safely use, store and throw away your medicines at www.disposemymeds.org.          This list is accurate as of 10/9/18  4:56 PM.  Always use your most recent med list.                   Brand Name Dispense Instructions for use Diagnosis    * cholecalciferol 1000 UNIT tablet    vitamin D3     Take 1,000 Units by mouth        * cholecalciferol 1000 UNIT tablet    vitamin D3     TK 1 T PO D        levothyroxine 100 MCG tablet    SYNTHROID/LEVOTHROID     TK 1 T PO D        ondansetron 4 MG ODT tab    ZOFRAN ODT    20 tablet    Take 1-2 tablets (4-8 mg) by mouth every 8 hours as needed for nausea    Early stage of pregnancy       PREPLUS 27-1 MG Tabs      TK 1 T PO DAILY        * Notice:  This list has 2 medication(s) that are the same as other medications prescribed for you. Read the directions carefully, and ask your doctor or other care provider to review them with you.

## 2018-10-09 NOTE — PROGRESS NOTES
Infusion Nursing Note:  Rocio Chongclifford presents today for IVFs and Zofran.    Patient seen by provider today: No   present during visit today: Not Applicable.    Note: Patient very chilled, nauseated and miserable throughout infusion despite warm blankets and bear hugger being used. VS checked and were stable throughout. Upon assessment, patient stated she felt very cold and nauseated. Patient instructed to go to ER if symptoms worsened or did not improve.     Intravenous Access:  Peripheral IV placed.    Treatment Conditions:  Not Applicable.      Post Infusion Assessment:  Patient tolerated infusion without incident.  Blood return noted pre and post infusion.  Site patent and intact, free from redness, edema or discomfort.  No evidence of extravasations.  Access discontinued per protocol.    Discharge Plan:   Patient declined prescription refills.  Discharge instructions reviewed with: Patient.  Patient verbalized understanding of discharge instructions and all questions answered.  Copy of AVS reviewed with patient.  Patient will return as needed for next appointment.  Patient discharged in stable condition accompanied by: self.  Departure Mode: Ambulatory.    Eduarda Singh RN

## 2018-10-09 NOTE — TELEPHONE ENCOUNTER
I do not see any forms in Jevon's box or in the office. If Jevon said they were done I am assuming she did them. Can you check scans or check with Blossom on how to look up what forms have been faxed for her?     Love

## 2018-10-09 NOTE — TELEPHONE ENCOUNTER
Contacted Jevon Montero CNM and she confirmed she has the forms and are completed. She will fax them when returns to the office 10/10/18.

## 2018-10-09 NOTE — TELEPHONE ENCOUNTER
Looking for Medical Leave papers/forms to be completed and faxed to her employer. They state they have not received them yet. Pt states Jevon informed her that she received them already.  [employer] and her employer - [employer]  Both need completed forms by Friday, 10/12/18    Pt wants to let Jevon know that she is receiving a transfusion today too.    No other questions at this time.

## 2018-10-11 ENCOUNTER — NURSE TRIAGE (OUTPATIENT)
Dept: NURSING | Facility: CLINIC | Age: 33
End: 2018-10-11

## 2018-10-11 NOTE — TELEPHONE ENCOUNTER
Patient calling reporting having abdominal cramping. Rates abdominal cramping at 7/10 and has had it for the last two hours. States feeling weak and wanting to lay down. Denies any vaginal bleed. Patient currently 8weeks pregnant. Per guideline, advised patient to be seen at the emergency department. Caller verbalized understanding. Denies further questions.      ASSESSMENT:      Reason for Disposition    MODERATE-SEVERE abdominal pain (e.g., interferes with normal activities, awakens from sleep)    Additional Information    Negative: Shock suspected (e.g., cold/pale/clammy skin, too weak to stand, low BP, rapid pulse)    Negative: Passed out (i.e., lost consciousness, collapsed and was not responding)    Negative: Difficult to awaken or acting confused  (e.g., disoriented, slurred speech)    Negative: Sounds like a life-threatening emergency to the triager    Negative: Followed an abdomen (stomach) injury    Negative: [1] Abdominal pain AND [2] pregnant > 20 weeks    Protocols used: PREGNANCY - ABDOMINAL PAIN LESS THAN 20 WEEKS EGA-ADULT-

## 2018-10-12 ENCOUNTER — PRENATAL OFFICE VISIT (OUTPATIENT)
Dept: MIDWIFE SERVICES | Facility: CLINIC | Age: 33
End: 2018-10-12
Payer: COMMERCIAL

## 2018-10-12 ENCOUNTER — TELEPHONE (OUTPATIENT)
Dept: NURSING | Facility: CLINIC | Age: 33
End: 2018-10-12

## 2018-10-12 VITALS — SYSTOLIC BLOOD PRESSURE: 102 MMHG | DIASTOLIC BLOOD PRESSURE: 58 MMHG | BODY MASS INDEX: 22.4 KG/M2 | WEIGHT: 143 LBS

## 2018-10-12 DIAGNOSIS — O09.511 ELDERLY PRIMIGRAVIDA IN FIRST TRIMESTER: Primary | ICD-10-CM

## 2018-10-12 DIAGNOSIS — O99.280 HYPOTHYROID IN PREGNANCY, ANTEPARTUM: ICD-10-CM

## 2018-10-12 DIAGNOSIS — E03.9 HYPOTHYROID IN PREGNANCY, ANTEPARTUM: ICD-10-CM

## 2018-10-12 DIAGNOSIS — O21.9 NAUSEA/VOMITING IN PREGNANCY: ICD-10-CM

## 2018-10-12 LAB
ALBUMIN UR-MCNC: NEGATIVE MG/DL
APPEARANCE UR: CLEAR
BACTERIA #/AREA URNS HPF: ABNORMAL /HPF
BILIRUB UR QL STRIP: NEGATIVE
COLOR UR AUTO: YELLOW
GLUCOSE UR STRIP-MCNC: ABNORMAL MG/DL
HGB UR QL STRIP: NEGATIVE
KETONES UR STRIP-MCNC: NEGATIVE MG/DL
LEUKOCYTE ESTERASE UR QL STRIP: NEGATIVE
NITRATE UR QL: NEGATIVE
NON-SQ EPI CELLS #/AREA URNS LPF: ABNORMAL /LPF
PH UR STRIP: 7 PH (ref 5–7)
RBC #/AREA URNS AUTO: ABNORMAL /HPF
SOURCE: ABNORMAL
SP GR UR STRIP: 1.02 (ref 1–1.03)
UROBILINOGEN UR STRIP-ACNC: 0.2 EU/DL (ref 0.2–1)
WBC #/AREA URNS AUTO: ABNORMAL /HPF

## 2018-10-12 PROCEDURE — 81001 URINALYSIS AUTO W/SCOPE: CPT | Performed by: ADVANCED PRACTICE MIDWIFE

## 2018-10-12 PROCEDURE — 99213 OFFICE O/P EST LOW 20 MIN: CPT | Performed by: ADVANCED PRACTICE MIDWIFE

## 2018-10-12 NOTE — TELEPHONE ENCOUNTER
Pt calling to find out if the paperwork had been faxed out regarding her time off from work. She would also like to have a letter to extend her time off. Pt was also seen in the ER at [facility] yesterday for cramping. They gave her an enema and she passed stool and felt better.  She would like Jevon to call her.    Discussed w/ Jevon-  The paperwork has been faxed- earlier today. She wants to have the pt schedule an appt to be seen to discuss add'l time off from work.    Called the pt back. Reviewed LS  Recommendation. Pt was transferred to scheduling.

## 2018-10-12 NOTE — MR AVS SNAPSHOT
After Visit Summary   10/12/2018    oRcio Elkins    MRN: [id number]           Patient Information     Date Of Birth          1985        Visit Information        Provider Department      10/12/2018 3:20 PM Jevon Montero APRN CNM [facility] [place]        Today's Diagnoses     Hypothyroid in pregnancy, antepartum    -  1    Nausea/vomiting in pregnancy        Elderly primigravida in first trimester           Follow-ups after your visit        Additional Services     ENDOCRINOLOGY ADULT REFERRAL       Your provider has referred you to: [facility]: [facility] [phone]   [email or web address]      Please be aware that coverage of these services is subject to the terms and limitations of your health insurance plan.  Call member services at your health plan with any benefit or coverage questions.      Please bring the following to your appointment:    >>   Any x-rays, CTs or MRIs which have been performed.  Contact the facility where they were done to arrange for  prior to your scheduled appointment.  Any new CT, MRI or other procedures ordered by your specialist must be performed at a [facility] facility or coordinated by your clinic's referral office.    >>   List of current medications   >>   This referral request   >>   Any documents/labs given to you for this referral                  Your next 10 appointments already scheduled     Nov 01, 2018  9:10 AM CDT   ESTABLISHED PRENATAL with OMAR Gunter CNM   [facility] [place] ([facility] [place])    [address] [place] [id number] [phone]              Who to contact     If you have questions or need follow up information about today's clinic visit or your schedule please contact [facility] directly at [phone].  Normal or non-critical lab and imaging results will be communicated to you by  MyChart, letter or phone within 4 business days after the clinic has received the results. If you do not hear from us within 7 days, please contact the clinic through MyChart or phone. If you have a critical or abnormal lab result, we will notify you by phone as soon as possible.  Submit refill requests through Musiwave or call your pharmacy and they will forward the refill request to us. Please allow 3 business days for your refill to be completed.          Additional Information About Your Visit        Care EveryWhere ID     This is your Care EveryWhere ID. This could be used by other organizations to access your [place] medical records  [id number]        Your Vitals Were     Last Period BMI (Body Mass Index)                08/16/2018 (Exact Date) 22.4 kg/m2           Blood Pressure from Last 3 Encounters:   10/12/18 102/58   10/09/18 112/59   10/04/18 100/70    Weight from Last 3 Encounters:   10/12/18 143 lb (64.9 kg)   10/04/18 139 lb 9.6 oz (63.3 kg)   09/27/18 142 lb (64.4 kg)              We Performed the Following     ENDOCRINOLOGY ADULT REFERRAL     UA with Microscopic        Primary Care Provider Fax #    Physician No Ref-Primary [phone]       No address on file        Equal Access to Services     [place] : Barrett Levine, may krause, qavandanata kaalmada humberto, lobito walter. So [facility] [phone].    ATENCIÓN: Si habla español, tiene a de dios disposición servicios gratuitos de asistencia lingüística. Llame al [phone].    We comply with applicable federal civil rights laws and [place] laws. We do not discriminate on the basis of race, color, national origin, age, disability, sex, sexual orientation, or gender identity.            Thank you!     Thank you for choosing [facility]  for your care. Our goal is always to provide you with excellent care. Hearing back from our patients is one way we can continue to improve our  services. Please take a few minutes to complete the written survey that you may receive in the mail after your visit with us. Thank you!             Your Updated Medication List - Protect others around you: Learn how to safely use, store and throw away your medicines at www.disposemymeds.org.          This list is accurate as of 10/12/18  4:23 PM.  Always use your most recent med list.                   Brand Name Dispense Instructions for use Diagnosis    * cholecalciferol 1000 UNIT tablet    vitamin D3     Take 1,000 Units by mouth        * cholecalciferol 1000 UNIT tablet    vitamin D3     TK 1 T PO D        levothyroxine 100 MCG tablet    SYNTHROID/LEVOTHROID     TK 1 T PO D        ondansetron 4 MG ODT tab    ZOFRAN ODT    20 tablet    Take 1-2 tablets (4-8 mg) by mouth every 8 hours as needed for nausea    Early stage of pregnancy       PREPLUS 27-1 MG Tabs      TK 1 T PO DAILY        * Notice:  This list has 2 medication(s) that are the same as other medications prescribed for you. Read the directions carefully, and ask your doctor or other care provider to review them with you.

## 2018-10-12 NOTE — PROGRESS NOTES
Results discussed directly with patient while patient was present with Jevon COURTNEY CNM. Any further details documented in the note.    Rachel Sky DNP, APRN, KELLENM

## 2018-10-12 NOTE — PROGRESS NOTES
Subjective:  Rocio is here today due to nausea and vomiting in pregnancy. She states she has fatigue, dizziness, nausea constantly, vomiting x1 day, and dry heaving. She has not been to work at all this week. She is taking Zofran occasionally (about 1x per day).     She has been unable to work since September 27 due to the N&V. She was last in for an appointment on 10/4/18. At that time paperwork (short term disability and FMLA) were will out for through Oct 5. IV infusion was set up at that time. In the past week she went to the infusion center on 10/9 and was in the ED on 10/11 for constipation/abdominal cramping. Today she is in for more paperwork to be filled out.    Other pertinent information: Rocio has hypothyroidism (TSH at new ob 15.71). She has not taken her synthroid since N&V started.    Objective:  /58  Wt 143 lb (64.9 kg)  LMP 08/16/2018 (Exact Date)  BMI 22.4 kg/m2    EXAM:  Constitutional: healthy, alert and no distress   Respiratory: unlabored breathing  Abdomen: negative  JOINT/EXTREMITIES: extremities normal- no gross deformities noted, gait normal and normal muscle tone    UA RESULTS:  Recent Labs   Lab Test  10/12/18   1540   COLOR  Yellow   APPEARANCE  Clear   URINEGLC  1+*   URINEBILI  Negative   URINEKETONE  Negative   SG  1.020   UBLD  Negative   URINEPH  7.0   PROTEIN  Negative   UROBILINOGEN  0.2   NITRITE  Negative   LEUKEST  Negative   RBCU  O - 2   WBCU  0 - 5     FHT not listened to today due to early gestation.    Assessment/Plan:    (O09.511) Elderly primigravida in first trimester  (primary encounter diagnosis)  Plan: Continue with stool softeners to prevent constipation in pregnancy. Drink fluids in small amounts throughout the day.  Rocio should be seen at the next scheduled prenatal appointment or sooner if indicated.     (O21.9) Nausea/vomiting in pregnancy  Comment: Paperwork filled out to cover from 10/8 though 10/5. Discussed importance of going to work when able and  checking with employer on FMLA/STD and absence policies. I explained that normally FMLA covers only 12 weeks in one year and she may not be able to take a full 12 weeks of maternity if she continues to take time off for N&V. I offered to recommend shorter days or work restrictions in order for her to get back to work sooner, but she stated she was unable to perform normal activities due to the nausea. At this time her  is not working due to being newly immigrated.     Plan: UA with [employer]        Weekly appointments with the midwives are needed if STD and FMLA are needed. Additionally I will only fill out the paperwork for subsequent weeks if IV infusions are done at least 2x per week to show the client is trying remedies. I encouraged her to take Zofran more consistently and often.    (O99.280,  E03.9) Hypothyroid in pregnancy, antepartum  Comment: Rocio stated she did not receive a phone call for her TSH results. I explained that they were high and she needed to consult with her endocrinologist. She stated her endocrinologist was in another healthcare system and she wasn't sure if she could still go to that clinic. Endo referral sent.   Plan: ENDOCRINOLOGY ADULT REFERRAL        Rocio with schedule an appointment with endo asap. I encouraged her to try to take her synthroid medication.    Jevon Montero, DNP, APRN, CNM

## 2018-10-13 ENCOUNTER — NURSE TRIAGE (OUTPATIENT)
Dept: NURSING | Facility: CLINIC | Age: 33
End: 2018-10-13

## 2018-10-13 NOTE — TELEPHONE ENCOUNTER
"Sister calling. Patient feels fatigued and is wondering if her thyroid levels can affect her feeling tired. I advised that this could make her feel tired, in addition to being 8 weeks pregnant. Call back with further questions. Asking who she is supposed to be calling them for a follow up with a \"specialist\"? No information found in the chart about a referral. Advised to follow up Monday by telephone with midwife.  Mary Greco RN  [facility]    "

## 2018-10-15 ENCOUNTER — TELEPHONE (OUTPATIENT)
Dept: OBGYN | Facility: CLINIC | Age: 33
End: 2018-10-15

## 2018-10-15 ENCOUNTER — TELEPHONE (OUTPATIENT)
Dept: MIDWIFE SERVICES | Facility: CLINIC | Age: 33
End: 2018-10-15

## 2018-10-15 ENCOUNTER — OFFICE VISIT (OUTPATIENT)
Dept: FAMILY MEDICINE | Facility: CLINIC | Age: 33
End: 2018-10-15
Payer: COMMERCIAL

## 2018-10-15 VITALS
RESPIRATION RATE: 16 BRPM | DIASTOLIC BLOOD PRESSURE: 73 MMHG | WEIGHT: 139.2 LBS | TEMPERATURE: 98.2 F | BODY MASS INDEX: 21.8 KG/M2 | SYSTOLIC BLOOD PRESSURE: 111 MMHG | HEART RATE: 95 BPM

## 2018-10-15 DIAGNOSIS — G44.219 EPISODIC TENSION-TYPE HEADACHE, NOT INTRACTABLE: Primary | ICD-10-CM

## 2018-10-15 DIAGNOSIS — E89.0 POSTABLATIVE HYPOTHYROIDISM: ICD-10-CM

## 2018-10-15 PROCEDURE — 99214 OFFICE O/P EST MOD 30 MIN: CPT | Performed by: FAMILY MEDICINE

## 2018-10-15 RX ORDER — LEVOTHYROXINE SODIUM 150 UG/1
150 TABLET ORAL DAILY
Qty: 90 TABLET | Refills: 1 | Status: SHIPPED | OUTPATIENT
Start: 2018-10-15 | End: 2018-12-13

## 2018-10-15 RX ORDER — HYDROCODONE BITARTRATE AND ACETAMINOPHEN 5; 325 MG/1; MG/1
1 TABLET ORAL EVERY 4 HOURS PRN
Qty: 10 TABLET | Refills: 0 | Status: SHIPPED | OUTPATIENT
Start: 2018-10-15 | End: 2018-10-18

## 2018-10-15 NOTE — TELEPHONE ENCOUNTER
Patient is calling today to tell us that she called the Endocrinology office and they cannot get her until February. She would like another place to call or be advised what to do. Please call her on her cell.

## 2018-10-15 NOTE — PATIENT INSTRUCTIONS
Increase thyroid dose to 150 mcg daily.  Recheck tsh in 2 months.    Recommend every 2 month tsh during pregnancy, goal TSH is < 2.0.  Adjust dose as needed.        Norco for headache if no relief with sleep. To ED for IVF if no improvement.      Follow up with OB provider as scheduled.

## 2018-10-15 NOTE — TELEPHONE ENCOUNTER
Reviewed with [facility] visit was not for IV hydration. Pt was offered PHN and accepted. Spoke to pt and she understands that PHN does not give IV hydration. Does not want to see right now, not until feeling better. Left detailed message on PHN's voicemail. Included call back regarding message.

## 2018-10-15 NOTE — TELEPHONE ENCOUNTER
Vee CARRERO from [place] called and see contacted pt for visit. Pt told PHN that she needs IV hydration. Vee CARRERO states she does not given IV hydration. Wants to know if this was the intent of the referral. Routing to CNM to review. Please advise.

## 2018-10-15 NOTE — NURSING NOTE
"Chief Complaint   Patient presents with     Thyroid Disease       Initial /73  Pulse 95  Temp 98.2  F (36.8  C) (Oral)  Resp 16  Wt 139 lb 3.2 oz (63.1 kg)  LMP 08/16/2018 (Exact Date)  BMI 21.8 kg/m2 Estimated body mass index is 21.8 kg/(m^2) as calculated from the following:    Height as of 10/4/18: 5' 7\" (1.702 m).    Weight as of this encounter: 139 lb 3.2 oz (63.1 kg).  Medication Reconciliation: complete  Doc Vidal CMA    "

## 2018-10-15 NOTE — PROGRESS NOTES
SUBJECTIVE:   Rocio Elkins is a 32 year old female who presents to clinic today for the following health issues:      Hypothyroidism Follow-up      Since last visit, patient describes the following symptoms: Weight stable, no hair loss, no skin changes, no constipation, no loose stools    Elevated TSH noted on prenatal lab work.  Recommended to follow up regarding this.  Told to discuss dose change.      Headache x 1 day       Amount of exercise or physical activity: Normally active     Problems taking medications regularly: No    Medication side effects: none    Diet: regular (no restrictions)    Pt with known hypothyroidism on 100mcg of levothyroxine prior to pregnancy.  TSH elevated on prenatal labs, told her OB provider could not adjust her medication.  Pt does not have other PCP or endocrinologist.  Unclear why OB provider could not adjust med.      Pt has had significant N&V with early pregnancy, using zofran for help, this helps some.   Today has severe HA, c/o sinus pressure and right ear pain, started after vomiting.  Unable to pop her right ear.  Does have mild photophobia, no other vision changes.  No hearing changes.  No neurologic symptoms.  Tylenol did not help headache, is taking zofran as needed for nausea    States her OB provider recommended she go to [facility].  Has not done that yet    Problem list and histories reviewed & adjusted, as indicated.  Additional history: as documented    Patient Active Problem List   Diagnosis     LTBI (latent tuberculosis infection)     Postablative hypothyroidism     Thyrotoxic exophthalmos     Hypovitaminosis D     Advanced maternal age, 1st pregnancy     Nausea/vomiting in pregnancy     Past Surgical History:   Procedure Laterality Date     NO HISTORY OF SURGERY         Social History   Substance Use Topics     Smoking status: Never Smoker     Smokeless tobacco: Never Used     Alcohol use No     History reviewed. No pertinent family  history.      Current Outpatient Prescriptions   Medication Sig Dispense Refill     cholecalciferol (VITAMIN D3) 1000 UNIT tablet Take 1,000 Units by mouth       HYDROcodone-acetaminophen (NORCO) 5-325 MG per tablet Take 1 tablet by mouth every 4 hours as needed for pain 10 tablet 0     levothyroxine (SYNTHROID/LEVOTHROID) 150 MCG tablet Take 1 tablet (150 mcg) by mouth daily 90 tablet 1     ondansetron (ZOFRAN ODT) 4 MG ODT tab Take 1-2 tablets (4-8 mg) by mouth every 8 hours as needed for nausea 20 tablet 1     Prenatal Vit-Fe Fumarate-FA (PREPLUS) 27-1 MG TABS TK 1 T PO DAILY  1     [DISCONTINUED] levothyroxine (SYNTHROID/LEVOTHROID) 100 MCG tablet TK 1 T PO D  1     BP Readings from Last 3 Encounters:   10/15/18 111/73   10/12/18 102/58   10/09/18 112/59    Wt Readings from Last 3 Encounters:   10/15/18 139 lb 3.2 oz (63.1 kg)   10/12/18 143 lb (64.9 kg)   10/04/18 139 lb 9.6 oz (63.3 kg)                  Labs reviewed in EPIC    Reviewed and updated as needed this visit by clinical staff  Tobacco  Allergies  Meds  Problems  Med Hx  Surg Hx  Fam Hx  Soc Hx        Reviewed and updated as needed this visit by Provider  Allergies  Meds  Problems         ROS:  Constitutional, HEENT, cardiovascular, pulmonary, gi and gu systems are negative, except as otherwise noted.    OBJECTIVE:     /73  Pulse 95  Temp 98.2  F (36.8  C) (Oral)  Resp 16  Wt 139 lb 3.2 oz (63.1 kg)  LMP 08/16/2018 (Exact Date)  BMI 21.8 kg/m2  Body mass index is 21.8 kg/(m^2).  GENERAL: fatigued,  alert and mild distress due to ha  EYES: Eyes grossly normal to inspection, PERRL and conjunctivae and sclerae normal  HENT: normal cephalic/atraumatic and dry lips  MS: no gross musculoskeletal defects noted, no edema  SKIN: no suspicious lesions or rashes  NEURO: Normal strength and tone, sensory exam grossly normal, mentation intact and cranial nerves 2-12 intact  PSYCH: mentation appears normal, anxious and fatigued    Diagnostic  Test Results:  none     ASSESSMENT/PLAN:     (G44.219) Episodic tension-type headache, not intractable  (primary encounter diagnosis)  Comment: could be side effect from zofran, no evidence for mass or other central cause  Plan: HYDROcodone-acetaminophen (NORCO) 5-325 MG per         tablet        Reviewed importance of hydration and possible side effect of zofran is ha.  Reviewed use of norco in pregnancy.  Encouraged her to have infusion therapy as ordered by her provider    (E89.0) Postablative hypothyroidism  Comment: not to goal  Plan: levothyroxine (SYNTHROID/LEVOTHROID) 150 MCG         tablet        Goal tsh in pregnancy is 2.0 or less. Will increase dose to 150 mcg, f/u with OB provider for recheck in 2 months.  Should check every 2 months during pregnancy and adjust as needed.    Patient Instructions     Increase thyroid dose to 150 mcg daily.  Recheck tsh in 2 months.    Recommend every 2 month tsh during pregnancy, goal TSH is < 2.0.  Adjust dose as needed.        Norco for headache if no relief with sleep. To ED for IVF if no improvement.      Follow up with OB provider as scheduled.      Delia Mcdaniel MD  [facility]

## 2018-10-15 NOTE — MR AVS SNAPSHOT
After Visit Summary   10/15/2018    Rocio Elkins    MRN: [id number]           Patient Information     Date Of Birth          1985        Visit Information        Provider Department      10/15/2018 1:40 PM Delia Mcdaniel MD [facility]        Today's Diagnoses     Episodic tension-type headache, not intractable    -  1    Postablative hypothyroidism          Care Instructions      Increase thyroid dose to 150 mcg daily.  Recheck tsh in 2 months.    Recommend every 2 month tsh during pregnancy, goal TSH is < 2.0.  Adjust dose as needed.        Norco for headache if no relief with sleep. To ED for IVF if no improvement.      Follow up with OB provider as scheduled.          Follow-ups after your visit        Follow-up notes from your care team     Return in about 1 week (around 10/22/2018) for ob check.      Your next 10 appointments already scheduled     Nov 01, 2018  9:10 AM CDT   ESTABLISHED PRENATAL with OMAR Gunter CNM   [facility]    [address] [place] [id number] [phone]              Who to contact     If you have questions or need follow up information about today's clinic visit or your schedule please contact [facility] directly at [phone].  Normal or non-critical lab and imaging results will be communicated to you by MyChart, letter or phone within 4 business days after the clinic has received the results. If you do not hear from us within 7 days, please contact the clinic through MyChart or phone. If you have a critical or abnormal lab result, we will notify you by phone as soon as possible.  Submit refill requests through [employer] or call your pharmacy and they will forward the refill request to us. Please allow 3 business days for your refill to be completed.          Additional Information About Your Visit        Care EveryWhere ID     This is  your Care EveryWhere ID. This could be used by other organizations to access your [facility] medical records  [id number]        Your Vitals Were     Pulse Temperature Respirations Last Period BMI (Body Mass Index)       95 98.2  F (36.8  C) (Oral) 16 08/16/2018 (Exact Date) 21.8 kg/m2        Blood Pressure from Last 3 Encounters:   10/15/18 111/73   10/12/18 102/58   10/09/18 112/59    Weight from Last 3 Encounters:   10/15/18 139 lb 3.2 oz (63.1 kg)   10/12/18 143 lb (64.9 kg)   10/04/18 139 lb 9.6 oz (63.3 kg)              Today, you had the following     No orders found for display         Today's Medication Changes          These changes are accurate as of 10/15/18  2:05 PM.  If you have any questions, ask your nurse or doctor.               Start taking these medicines.        Dose/Directions    HYDROcodone-acetaminophen 5-325 MG per tablet   Commonly known as:  NORCO   Used for:  Episodic tension-type headache, not intractable   Started by:  Delia Mcdaniel MD        Dose:  1 tablet   Take 1 tablet by mouth every 4 hours as needed for pain   Quantity:  10 tablet   Refills:  0         These medicines have changed or have updated prescriptions.        Dose/Directions    levothyroxine 150 MCG tablet   Commonly known as:  SYNTHROID/LEVOTHROID   This may have changed:    - medication strength  - See the new instructions.   Used for:  Postablative hypothyroidism   Changed by:  Delia Mcdaniel MD        Dose:  150 mcg   Take 1 tablet (150 mcg) by mouth daily   Quantity:  90 tablet   Refills:  1            Where to get your medicines      These medications were sent to [facility] [id number] [place] [address], [facility] [address]  [address] [facility] [address], [facility] [address]     Phone:  [phone]     levothyroxine 150 MCG tablet         Some of these will need a paper prescription and others can be bought over the counter.  Ask your nurse if you have questions.     Bring a paper prescription  for each of these medications     HYDROcodone-acetaminophen 5-325 MG per tablet               Information about OPIOIDS     PRESCRIPTION OPIOIDS: WHAT YOU NEED TO KNOW   We gave you an opioid (narcotic) pain medicine. It is important to manage your pain, but opioids are not always the best choice. You should first try all the other options your care team gave you. Take this medicine for as short a time (and as few doses) as possible.    Some activities can increase your pain, such as bandage changes or therapy sessions. It may help to take your pain medicine 30 to 60 minutes before these activities. Reduce your stress by getting enough sleep, working on hobbies you enjoy and practicing relaxation or meditation. Talk to your care team about ways to manage your pain beyond prescription opioids.    These medicines have risks:    DO NOT drive when on new or higher doses of pain medicine. These medicines can affect your alertness and reaction times, and you could be arrested for driving under the influence (DUI). If you need to use opioids long-term, talk to your care team about driving.    DO NOT operate heavy machinery    DO NOT do any other dangerous activities while taking these medicines.    DO NOT drink any alcohol while taking these medicines.     If the opioid prescribed includes acetaminophen, DO NOT take with any other medicines that contain acetaminophen. Read all labels carefully. Look for the word  acetaminophen  or  Tylenol.  Ask your pharmacist if you have questions or are unsure.    You can get addicted to pain medicines, especially if you have a history of addiction (chemical, alcohol or substance dependence). Talk to your care team about ways to reduce this risk.    All opioids tend to cause constipation. Drink plenty of water and eat foods that have a lot of fiber, such as fruits, vegetables, prune juice, apple juice and high-fiber cereal. Take a laxative (Miralax, milk of magnesia, Colace, Senna) if  you don t move your bowels at least every other day. Other side effects include upset stomach, sleepiness, dizziness, throwing up, tolerance (needing more of the medicine to have the same effect), physical dependence and slowed breathing.    Store your pills in a secure place, locked if possible. We will not replace any lost or stolen medicine. If you don t finish your medicine, please throw away (dispose) as directed by your pharmacist. The [place] Pollution Control Agency has more information about safe disposal: [email or web address]://[email or web address]-medications         Primary Care Provider Fax #    Physician No Ref-Primary [phone]       No address on file        Equal Access to Services     MORELIA [place] : Barrett Levine, may krause, saroj paul, lobito walter. So [facility] [phone].    ATENCIÓN: Si habla español, tiene a de dios disposición servicios gratuitos de asistencia lingüística. Llame al [phone].    We comply with applicable federal civil rights laws and [place] laws. We do not discriminate on the basis of race, color, national origin, age, disability, sex, sexual orientation, or gender identity.            Thank you!     Thank you for choosing [facility]  for your [facility]. Our goal is always to provide you with excellent care. Hearing back from our patients is one way we can continue to improve our services. Please take a few minutes to complete the written survey that you may receive in the mail after your visit with us. Thank you!             Your Updated Medication List - Protect others around you: Learn how to safely use, store and throw away your medicines at www.disposemymeds.org.          This list is accurate as of 10/15/18  2:05 PM.  Always use your most recent med list.                   Brand Name Dispense Instructions for use Diagnosis    cholecalciferol 1000 UNIT tablet    vitamin D3      Take 1,000 Units by mouth        HYDROcodone-acetaminophen 5-325 MG per tablet    NORCO    10 tablet    Take 1 tablet by mouth every 4 hours as needed for pain    Episodic tension-type headache, not intractable       levothyroxine 150 MCG tablet    SYNTHROID/LEVOTHROID    90 tablet    Take 1 tablet (150 mcg) by mouth daily    Postablative hypothyroidism       ondansetron 4 MG ODT tab    ZOFRAN ODT    20 tablet    Take 1-2 tablets (4-8 mg) by mouth every 8 hours as needed for nausea    Early stage of pregnancy       PREPLUS 27-1 MG Tabs      TK 1 T PO DAILY

## 2018-10-15 NOTE — TELEPHONE ENCOUNTER
Component Value Flag Ref Range Units Status Collected Lab   TSH 15.71 (H) 0.40 - 4.00 mU/L Final 10/04/2018  2:30 PM      Notes Recorded by Jevon Montero APRN CNM on 10/5/2018 at 1:46 PM  Please call Keon back to let her know that all her New OB labs are WNL except for her thyroid. I know she hasn't been taking her synthroid d/t nausea/vomiting. She needs to follow up with her endocrinologist asap for follow up and to find a solution.   Thanks,  Jevon    Called pt back. She states she has an appt today with a PCP to address her thyroid, as she could not get into an endocrinologist until 2/2019.  She is so tired and weak and needs to schedule an appt for another infusion through the infusion center, she will call them next. She will let us know if she has any updates after her appt today at 1:30p. No further questions at this time.

## 2018-10-16 ENCOUNTER — INFUSION THERAPY VISIT (OUTPATIENT)
Dept: INFUSION THERAPY | Facility: CLINIC | Age: 33
End: 2018-10-16
Attending: ADVANCED PRACTICE MIDWIFE
Payer: COMMERCIAL

## 2018-10-16 VITALS
RESPIRATION RATE: 18 BRPM | TEMPERATURE: 98.3 F | DIASTOLIC BLOOD PRESSURE: 65 MMHG | HEART RATE: 89 BPM | SYSTOLIC BLOOD PRESSURE: 111 MMHG

## 2018-10-16 DIAGNOSIS — O09.511 ELDERLY PRIMIGRAVIDA IN FIRST TRIMESTER: ICD-10-CM

## 2018-10-16 DIAGNOSIS — O21.9 NAUSEA/VOMITING IN PREGNANCY: Primary | ICD-10-CM

## 2018-10-16 PROCEDURE — 25000128 H RX IP 250 OP 636: Performed by: ADVANCED PRACTICE MIDWIFE

## 2018-10-16 PROCEDURE — 96360 HYDRATION IV INFUSION INIT: CPT

## 2018-10-16 RX ADMIN — SODIUM CHLORIDE, POTASSIUM CHLORIDE, SODIUM LACTATE AND CALCIUM CHLORIDE 1000 ML: 600; 310; 30; 20 INJECTION, SOLUTION INTRAVENOUS at 14:45

## 2018-10-16 ASSESSMENT — PAIN SCALES - GENERAL: PAINLEVEL: SEVERE PAIN (6)

## 2018-10-16 NOTE — PROGRESS NOTES
Infusion Nursing Note:  Rocio Elkins presents today for IVFs.    Patient seen by provider today: No   present during visit today: Not Applicable.    Note: Pt c/o headache at arrival.    She had not taken any OTC for pain releif.   may go to pharmacy in this building for tylenol.  Pt reported her headache was lessened after IVF infusion-she did not take any tylenol or other OTC while in infusion area.    Intravenous Access:  Peripheral IV placed.    Treatment Conditions:  Not Applicable.      Post Infusion Assessment:  Patient tolerated infusion without incident.  Blood return noted pre and post infusion.  Site patent and intact, free from redness, edema or discomfort.  No evidence of extravasations.  Access discontinued per protocol.    Discharge Plan:   Discharge instructions reviewed with: Patient and Family.  Patient and/or family verbalized understanding of discharge instructions and all questions answered.  Copy of AVS reviewed with patient and/or family. Patient discharged in stable condition accompanied by: .  Departure Mode: Ambulatory.  RTC  10/18/18  Chico Amin RN

## 2018-10-16 NOTE — MR AVS SNAPSHOT
After Visit Summary   10/16/2018    Rocio Elkins    MRN: [id number]           Patient Information     Date Of Birth          1985        Visit Information        Provider Department      10/16/2018 2:00 PM  INFUSION CHAIR 9 [facility]        Today's Diagnoses     Nausea/vomiting in pregnancy    -  1    Elderly primigravida in first trimester           Follow-ups after your visit        Your next 10 appointments already scheduled     Oct 18, 2018  2:00 PM CDT   Level 2 with  INFUSION CHAIR 10   [facility] ([facility])    [facility]  [address]  [place] [address]   [phone]            Nov 01, 2018  9:10 AM CDT   ESTABLISHED PRENATAL with OMAR Gunter CNM   [facility] [place] ([facility] [place])    [address]  [address]  [place] [address] [phone]              Who to contact     If you have questions or need follow up information about today's clinic visit or your schedule please contact [facility] directly at [phone].  Normal or non-critical lab and imaging results will be communicated to you by MyChart, letter or phone within 4 business days after the clinic has received the results. If you do not hear from us within 7 days, please contact the clinic through MyChart or phone. If you have a critical or abnormal lab result, we will notify you by phone as soon as possible.  Submit refill requests through [employer] or call your pharmacy and they will forward the refill request to us. Please allow 3 business days for your refill to be completed.          Additional Information About Your Visit        Care EveryWhere ID     This is your Care EveryWhere ID. This could be used by other organizations to access your [place] medical records  [id number]        Your Vitals Were      Pulse Temperature Respirations Last Period          89 98.3  F (36.8  C) (Oral) 18 08/16/2018 (Exact Date)         Blood Pressure from Last 3 Encounters:   10/16/18 111/65   10/15/18 111/73   10/12/18 102/58    Weight from Last 3 Encounters:   10/15/18 63.1 kg (139 lb 3.2 oz)   10/12/18 64.9 kg (143 lb)   10/04/18 63.3 kg (139 lb 9.6 oz)              Today, you had the following     No orders found for display       Primary Care Provider Fax #    Physician No Ref-Primary [phone]       No address on file        Equal Access to Services     [place] campus[facility]ANEY : Barrett Levine, may krause, saroj paul, lobito hernández . So [facility] [phone].    ATENCIÓN: Si habla español, tiene a de dios disposición servicios gratuitos de asistencia lingüística. Llame al [phone].    We comply with applicable federal civil rights laws and [place] laws. We do not discriminate on the basis of race, color, national origin, age, disability, sex, sexual orientation, or gender identity.            Thank you!     Thank you for choosing [facility] AND [facility]  for your care. Our goal is always to provide you with excellent care. Hearing back from our patients is one way we can continue to improve our services. Please take a few minutes to complete the written survey that you may receive in the mail after your visit with us. Thank you!             Your Updated Medication List - Protect others around you: Learn how to safely use, store and throw away your medicines at www.disposemymeds.org.          This list is accurate as of 10/16/18  4:57 PM.  Always use your most recent med list.                   Brand Name Dispense Instructions for use Diagnosis    cholecalciferol 1000 UNIT tablet    vitamin D3     Take 1,000 Units by mouth        HYDROcodone-acetaminophen 5-325 MG per tablet    NORCO    10 tablet    Take 1 tablet by mouth every 4 hours as needed for pain     Episodic tension-type headache, not intractable       levothyroxine 150 MCG tablet    SYNTHROID/LEVOTHROID    90 tablet    Take 1 tablet (150 mcg) by mouth daily    Postablative hypothyroidism       ondansetron 4 MG ODT tab    ZOFRAN ODT    20 tablet    Take 1-2 tablets (4-8 mg) by mouth every 8 hours as needed for nausea    Early stage of pregnancy       PREPLUS 27-1 MG Tabs      TK 1 T PO DAILY

## 2018-10-18 ENCOUNTER — TELEPHONE (OUTPATIENT)
Dept: OBGYN | Facility: CLINIC | Age: 33
End: 2018-10-18

## 2018-10-18 ENCOUNTER — PRENATAL OFFICE VISIT (OUTPATIENT)
Dept: MIDWIFE SERVICES | Facility: CLINIC | Age: 33
End: 2018-10-18
Payer: COMMERCIAL

## 2018-10-18 ENCOUNTER — INFUSION THERAPY VISIT (OUTPATIENT)
Dept: INFUSION THERAPY | Facility: CLINIC | Age: 33
End: 2018-10-18
Attending: ADVANCED PRACTICE MIDWIFE
Payer: COMMERCIAL

## 2018-10-18 VITALS
SYSTOLIC BLOOD PRESSURE: 111 MMHG | DIASTOLIC BLOOD PRESSURE: 72 MMHG | TEMPERATURE: 97.2 F | OXYGEN SATURATION: 100 % | RESPIRATION RATE: 16 BRPM | HEART RATE: 88 BPM

## 2018-10-18 VITALS — WEIGHT: 142 LBS | DIASTOLIC BLOOD PRESSURE: 58 MMHG | SYSTOLIC BLOOD PRESSURE: 100 MMHG | BODY MASS INDEX: 22.24 KG/M2

## 2018-10-18 DIAGNOSIS — O21.9 NAUSEA/VOMITING IN PREGNANCY: Primary | ICD-10-CM

## 2018-10-18 DIAGNOSIS — O09.91 SUPERVISION OF HIGH RISK PREGNANCY IN FIRST TRIMESTER: ICD-10-CM

## 2018-10-18 DIAGNOSIS — O09.511 ELDERLY PRIMIGRAVIDA IN FIRST TRIMESTER: ICD-10-CM

## 2018-10-18 PROCEDURE — 99207 ZZC PRENATAL VISIT: CPT | Performed by: ADVANCED PRACTICE MIDWIFE

## 2018-10-18 PROCEDURE — 96360 HYDRATION IV INFUSION INIT: CPT

## 2018-10-18 PROCEDURE — 25000128 H RX IP 250 OP 636: Performed by: ADVANCED PRACTICE MIDWIFE

## 2018-10-18 RX ADMIN — SODIUM CHLORIDE, POTASSIUM CHLORIDE, SODIUM LACTATE AND CALCIUM CHLORIDE 1000 ML: 600; 310; 30; 20 INJECTION, SOLUTION INTRAVENOUS at 14:16

## 2018-10-18 NOTE — TELEPHONE ENCOUNTER
Patient calling stating she cannot get a ride 2 days like this and really wants to do both today. Spoke with Rachel who said that was fine to do both today. Again explained to patient to be able to stay on disibilty she needs to have 1 midwife appointment/week and 2 infusions appts/week. Patient ok with plan.

## 2018-10-18 NOTE — TELEPHONE ENCOUNTER
LM on PHI stating that per midwives, she needs to schedule to have an infusion today vs be seen in clinic.She needs to call clinic back to reschedule her appt with the midwives to tomorrow 10/19/18 instead. In order for her to be put on disability, she needs to be having infusions twice a week. Patient has had only one done so far. Pt will call back if any questions/ to reschedule appt w/ MW.

## 2018-10-18 NOTE — MR AVS SNAPSHOT
After Visit Summary   10/18/2018    Rocio Elkins    MRN: [id number]           Patient Information     Date Of Birth          1985        Visit Information        Provider Department      10/18/2018 4:00 PM Rachel Sky APRN CNM [facility] [place]        Today's Diagnoses     Nausea/vomiting in pregnancy    -  1    Supervision of high risk pregnancy in first trimester           Follow-ups after your visit        Follow-up notes from your care team     Return in about 1 week (around 10/25/2018).      Your next 10 appointments already scheduled     Nov 01, 2018  9:10 AM CDT   ESTABLISHED PRENATAL with OMAR Gunter CNM   [facility] [place] ([facility] [place])    [address] [place] [address] [address]  [facility] [address] [phone]              Who to contact     If you have questions or need follow up information about today's clinic visit or your schedule please contact [facility] directly at [phone].  Normal or non-critical lab and imaging results will be communicated to you by MyChart, letter or phone within 4 business days after the clinic has received the results. If you do not hear from us within 7 days, please contact the clinic through MyChart or phone. If you have a critical or abnormal lab result, we will notify you by phone as soon as possible.  Submit refill requests through [employer] or call your pharmacy and they will forward the refill request to us. Please allow 3 business days for your refill to be completed.          Additional Information About Your Visit        Care EveryWhere ID     This is your Care EveryWhere ID. This could be used by other organizations to access your [place] medical records  [id number]        Your Vitals Were     Last Period BMI (Body Mass Index)                08/16/2018 (Exact Date) 22.24 kg/m2           Blood Pressure from Last 3 Encounters:   10/18/18 100/58    10/18/18 111/72   10/16/18 111/65    Weight from Last 3 Encounters:   10/18/18 142 lb (64.4 kg)   10/15/18 139 lb 3.2 oz (63.1 kg)   10/12/18 143 lb (64.9 kg)              Today, you had the following     No orders found for display       Primary Care Provider Fax #    Physician No Ref-Primary [phone]       No address on file        Equal Access to Services     [place] : Hadii farzad ku hadasho Soomaali, waaxda luqadaha, qaybta kaalmada adeegyada, lobito gallegos chelsylauren salazarpatriciarosa hernández . So [facility] [phone].    ATENCIÓN: Si habla español, tiene a de dios disposición servicios gratuitos de asistencia lingüística. Llame al [phone].    We comply with applicable federal civil rights laws and [place] laws. We do not discriminate on the basis of race, color, national origin, age, disability, sex, sexual orientation, or gender identity.            Thank you!     Thank you for choosing [facility] [place]  for your care. Our goal is always to provide you with excellent care. Hearing back from our patients is one way we can continue to improve our services. Please take a few minutes to complete the written survey that you may receive in the mail after your visit with us. Thank you!             Your Updated Medication List - Protect others around you: Learn how to safely use, store and throw away your medicines at www.disposemymeds.org.          This list is accurate as of 10/18/18  5:48 PM.  Always use your most recent med list.                   Brand Name Dispense Instructions for use Diagnosis    cholecalciferol 1000 UNIT tablet    vitamin D3     Take 1,000 Units by mouth        levothyroxine 150 MCG tablet    SYNTHROID/LEVOTHROID    90 tablet    Take 1 tablet (150 mcg) by mouth daily    Postablative hypothyroidism       ondansetron 4 MG ODT tab    ZOFRAN ODT    20 tablet    Take 1-2 tablets (4-8 mg) by mouth every 8 hours as needed for nausea    Early stage of pregnancy       PREPLUS 27-1 MG  Tabs      TK 1 T PO DAILY

## 2018-10-18 NOTE — MR AVS SNAPSHOT
After Visit Summary   10/18/2018    Rocio Elkins    MRN: [id number]           Patient Information     Date Of Birth          1985        Visit Information        Provider Department      10/18/2018 2:00 PM  INFUSION CHAIR 10 [facility]        Today's Diagnoses     Nausea/vomiting in pregnancy    -  1    Elderly primigravida in first trimester           Follow-ups after your visit        Your next 10 appointments already scheduled     Oct 18, 2018  4:00 PM CDT   ESTABLISHED PRENATAL with OMAR Fields CNM   [facility] [place] ([facility] [place])    [address]  [place] [address]   [phone]            Nov 01, 2018  9:10 AM CDT   ESTABLISHED PRENATAL with OMAR Gunter CNM   [facility] [place] ([facility] [place])    [address]  [place] [address]   [phone]              Who to contact     If you have questions or need follow up information about today's clinic visit or your schedule please contact [facility] directly at [phone].  Normal or non-critical lab and imaging results will be communicated to you by MyChart, letter or phone within 4 business days after the clinic has received the results. If you do not hear from us within 7 days, please contact the clinic through MyChart or phone. If you have a critical or abnormal lab result, we will notify you by phone as soon as possible.  Submit refill requests through [employer] or call your pharmacy and they will forward the refill request to us. Please allow 3 business days for your refill to be completed.          Additional Information About Your Visit        Care EveryWhere ID     This is your Care EveryWhere ID. This could be used by other organizations to access your [place] medical records  [id number]        Your Vitals Were     Pulse  Temperature Respirations Last Period Pulse Oximetry       88 97.2  F (36.2  C) 16 08/16/2018 (Exact Date) 100%        Blood Pressure from Last 3 Encounters:   10/18/18 111/72   10/16/18 111/65   10/15/18 111/73    Weight from Last 3 Encounters:   10/15/18 63.1 kg (139 lb 3.2 oz)   10/12/18 64.9 kg (143 lb)   10/04/18 63.3 kg (139 lb 9.6 oz)              Today, you had the following     No orders found for display       Primary Care Provider Fax #    Physician No Ref-Primary [phone]       No address on file        Equal Access to Services     [facility] RUBÉN : Barrett Levine, may krause, saorj paul, lobito hernández . So [facility] [phone].    ATENCIÓN: Si habla español, tiene a de dios disposición servicios gratuitos de asistencia lingüística. Llame al [phone].    We comply with applicable federal civil rights laws and [place] laws. We do not discriminate on the basis of race, color, national origin, age, disability, sex, sexual orientation, or gender identity.            Thank you!     Thank you for choosing [facility]  for your care. Our goal is always to provide you with excellent care. Hearing back from our patients is one way we can continue to improve our services. Please take a few minutes to complete the written survey that you may receive in the mail after your visit with us. Thank you!             Your Updated Medication List - Protect others around you: Learn how to safely use, store and throw away your medicines at www.disposemymeds.org.          This list is accurate as of 10/18/18  3:25 PM.  Always use your most recent med list.                   Brand Name Dispense Instructions for use Diagnosis    cholecalciferol 1000 UNIT tablet    vitamin D3     Take 1,000 Units by mouth        HYDROcodone-acetaminophen 5-325 MG per tablet    NORCO    10 tablet    Take 1 tablet by mouth every 4 hours as needed for pain     Episodic tension-type headache, not intractable       levothyroxine 150 MCG tablet    SYNTHROID/LEVOTHROID    90 tablet    Take 1 tablet (150 mcg) by mouth daily    Postablative hypothyroidism       ondansetron 4 MG ODT tab    ZOFRAN ODT    20 tablet    Take 1-2 tablets (4-8 mg) by mouth every 8 hours as needed for nausea    Early stage of pregnancy       PREPLUS 27-1 MG Tabs      TK 1 T PO DAILY

## 2018-10-18 NOTE — PROGRESS NOTES
Infusion Nursing Note:  Rocio Elkins presents today for IV Fluids.    Patient seen by provider today: No   present during visit today: Not Applicable.    Note: N/A.    Intravenous Access:  Peripheral IV placed.    Treatment Conditions:  Not Applicable.      Post Infusion Assessment:  Patient tolerated infusion without incident.  Blood return noted pre and post infusion.  Site patent and intact, free from redness, edema or discomfort.  No evidence of extravasations.  Access discontinued per protocol.    Discharge Plan:   AVS to patient via MYCHART.  Patient will return as scheduled for next appointment.   Patient discharged in stable condition accompanied by: sister.  Departure Mode: Ambulatory.    Kiki Richardson RN

## 2018-10-18 NOTE — PROGRESS NOTES
S: Rocio is here today for a nausea and vomiting in pregnancy checkup.  She is accompanied by her sister. Rocio came to the clinic after her infusion therapy appointment; she also had an infusion therapy visit on 10/16. She states she continues to have fatigue, dizziness, constant nausea, and is vomiting once per day.   Rocio reports that she is able to eat fine but it's drinking fluids that is difficult. She has been taking her Zofran once per day.    Rocio had an appointment with Primary Care on 10/15. She reports that she was unable to get an appointment with endocrinology until late winter, so she went to Primary Care. Her levothyroxine was increased to 150mg. Rocio states that she has taken her levothyroxine each day since.     Rocio also states that, beginning a few days ago, she has been having sinus pain, ear pain and headaches for which she has been taking Tylenol. Primary care prescribed [place] but Rocio states she did not pick this up.     Rocio does not feel like she can return to work at this time due to fatigue, nausea and headache.  She states she is unable to do her normal activities, even at home.    O: /58  Wt 142 lb (64.4 kg)  LMP 08/16/2018 (Exact Date)  BMI 22.24 kg/m2     EXAM:  Constitutional: healthy, alert, no distress, appears fatigued   Respiratory: breathing unlabored  Neuro: A+Ox3    A/P:  -Reinforced need to continue with infusion therapy appointments at least twice per week and CNM visits weekly if FMLA is needed.   -Recommended sports drinks (Gatorade, Pedialyte, Propel) instead of plain water, and taking small sips at a time  -Sharp diet  -Recommended taking Zofran on a schedule q8h. May also add B6.  Rocio states that she has a sufficient amount of Zofran at home.  -Reinforced need to continue taking 150mg of levothyroxine each day  -Sinus pain/pressure, ear pain and headache may be viral. Continue to monitor; if symptoms persist for two weeks, may consider antibiotic  treatment. May continue with Tylenol. May try saline nasal spray or Mucinex if stuffiness or drainage occurs.   -Rocio states that her [facility] paperwork will be faxed to CNM office.   -Call the clinic if symptoms worsen. Rocio states that she is going to make her next infusion therapy appointments and will then call and make her CNM appointment for next week.    Rachel Sky, MADELINE, APRN, CNM

## 2018-10-23 ENCOUNTER — INFUSION THERAPY VISIT (OUTPATIENT)
Dept: INFUSION THERAPY | Facility: CLINIC | Age: 33
End: 2018-10-23
Payer: COMMERCIAL

## 2018-10-23 VITALS
DIASTOLIC BLOOD PRESSURE: 66 MMHG | SYSTOLIC BLOOD PRESSURE: 114 MMHG | TEMPERATURE: 98.1 F | RESPIRATION RATE: 16 BRPM | HEART RATE: 66 BPM

## 2018-10-23 DIAGNOSIS — O21.9 NAUSEA/VOMITING IN PREGNANCY: Primary | ICD-10-CM

## 2018-10-23 DIAGNOSIS — O09.91 SUPERVISION OF HIGH RISK PREGNANCY IN FIRST TRIMESTER: ICD-10-CM

## 2018-10-23 PROCEDURE — 25000128 H RX IP 250 OP 636: Performed by: ADVANCED PRACTICE MIDWIFE

## 2018-10-23 PROCEDURE — 96360 HYDRATION IV INFUSION INIT: CPT

## 2018-10-23 RX ADMIN — SODIUM CHLORIDE, POTASSIUM CHLORIDE, SODIUM LACTATE AND CALCIUM CHLORIDE 1000 ML: 600; 310; 30; 20 INJECTION, SOLUTION INTRAVENOUS at 14:25

## 2018-10-23 ASSESSMENT — PAIN SCALES - GENERAL: PAINLEVEL: NO PAIN (0)

## 2018-10-23 NOTE — MR AVS SNAPSHOT
After Visit Summary   10/23/2018    Rocio Elkins    MRN: [id number]           Patient Information     Date Of Birth          1985        Visit Information        Provider Department      10/23/2018 2:00 PM  INFUSION CHAIR 10 [facility]        Today's Diagnoses     Nausea/vomiting in pregnancy    -  1    Supervision of high risk pregnancy in first trimester           Follow-ups after your visit        Your next 10 appointments already scheduled     Oct 26, 2018  1:30 PM CDT   Level 2 with  INFUSION CHAIR 1   [facility])    [facility]  [address]  [place] [address]   [phone]            Nov 01, 2018  9:10 AM CDT   ESTABLISHED PRENATAL with OMAR Gunter CNM   [facility] [place] ([facility] [place])    [address]  [address]  [place] [address] [phone]              Who to contact     If you have questions or need follow up information about today's clinic visit or your schedule please contact [facility] directly at [phone].  Normal or non-critical lab and imaging results will be communicated to you by MyChart, letter or phone within 4 business days after the clinic has received the results. If you do not hear from us within 7 days, please contact the clinic through MyChart or phone. If you have a critical or abnormal lab result, we will notify you by phone as soon as possible.  Submit refill requests through [employer] or call your pharmacy and they will forward the refill request to us. Please allow 3 business days for your refill to be completed.          Additional Information About Your Visit        Care EveryWhere ID     This is your Care EveryWhere ID. This could be used by other organizations to access your [place] medical records  [id number]        Your  Vitals Were     Pulse Temperature Respirations Last Period          66 98.1  F (36.7  C) (Oral) 16 08/16/2018 (Exact Date)         Blood Pressure from Last 3 Encounters:   10/23/18 114/66   10/18/18 100/58   10/18/18 111/72    Weight from Last 3 Encounters:   10/18/18 64.4 kg (142 lb)   10/15/18 63.1 kg (139 lb 3.2 oz)   10/12/18 64.9 kg (143 lb)              Today, you had the following     No orders found for display       Primary Care Provider Fax #    Physician No Ref-Primary [phone]       No address on file        Equal Access to Services     [facility] : Barrett Levine, may krause, saroj paul, lobito hernández . So [facility] [phone].    ATENCIÓN: Si habla español, tiene a de dios disposición servicios gratuitos de asistencia lingüística. LlFort Hamilton Hospital [phone].    We comply with applicable federal civil rights laws and [place] laws. We do not discriminate on the basis of race, color, national origin, age, disability, sex, sexual orientation, or gender identity.            Thank you!     Thank you for choosing [facility]  for your care. Our goal is always to provide you with excellent care. Hearing back from our patients is one way we can continue to improve our services. Please take a few minutes to complete the written survey that you may receive in the mail after your visit with us. Thank you!             Your Updated Medication List - Protect others around you: Learn how to safely use, store and throw away your medicines at www.disposemymeds.org.          This list is accurate as of 10/23/18  3:39 PM.  Always use your most recent med list.                   Brand Name Dispense Instructions for use Diagnosis    cholecalciferol 1000 UNIT tablet    vitamin D3     Take 1,000 Units by mouth        levothyroxine 150 MCG tablet    SYNTHROID/LEVOTHROID    90 tablet    Take 1 tablet (150 mcg) by mouth daily    Postablative  hypothyroidism       ondansetron 4 MG ODT tab    ZOFRAN ODT    20 tablet    Take 1-2 tablets (4-8 mg) by mouth every 8 hours as needed for nausea    Early stage of pregnancy       PREPLUS 27-1 MG Tabs      TK 1 T PO DAILY

## 2018-10-23 NOTE — PROGRESS NOTES
Infusion Nursing Note:  Rocio Elkins presents today for IVFs.    Patient seen by provider today: No   present during visit today: Not Applicable.    Note: N/A.    Intravenous Access:  Peripheral IV placed.    Treatment Conditions:  Not Applicable.      Post Infusion Assessment:  Patient tolerated infusion without incident.  Blood return noted pre and post infusion.  Site patent and intact, free from redness, edema or discomfort.  No evidence of extravasations.  Access discontinued per protocol.    Discharge Plan:   Discharge instructions reviewed with: Patient and Family.  Patient and/or family verbalized understanding of discharge instructions and all questions answered.  Copy of AVS reviewed with patient and/or family.  Patient will return 10/26/18 for next appointment.  Patient discharged in stable condition accompanied by: sister.  Departure Mode: Ambulatory.    Chico Amin RN

## 2018-10-26 ENCOUNTER — INFUSION THERAPY VISIT (OUTPATIENT)
Dept: INFUSION THERAPY | Facility: CLINIC | Age: 33
End: 2018-10-26
Payer: COMMERCIAL

## 2018-10-26 ENCOUNTER — PRENATAL OFFICE VISIT (OUTPATIENT)
Dept: MIDWIFE SERVICES | Facility: CLINIC | Age: 33
End: 2018-10-26
Payer: COMMERCIAL

## 2018-10-26 VITALS — BODY MASS INDEX: 22.4 KG/M2 | SYSTOLIC BLOOD PRESSURE: 110 MMHG | DIASTOLIC BLOOD PRESSURE: 60 MMHG | WEIGHT: 143 LBS

## 2018-10-26 VITALS
HEART RATE: 89 BPM | TEMPERATURE: 98.1 F | SYSTOLIC BLOOD PRESSURE: 118 MMHG | DIASTOLIC BLOOD PRESSURE: 63 MMHG | RESPIRATION RATE: 16 BRPM

## 2018-10-26 DIAGNOSIS — O21.9 NAUSEA/VOMITING IN PREGNANCY: Primary | ICD-10-CM

## 2018-10-26 DIAGNOSIS — O09.91 SUPERVISION OF HIGH RISK PREGNANCY IN FIRST TRIMESTER: ICD-10-CM

## 2018-10-26 DIAGNOSIS — O09.511 ADVANCED MATERNAL AGE, PRIMIGRAVIDA IN FIRST TRIMESTER, ANTEPARTUM: Primary | ICD-10-CM

## 2018-10-26 DIAGNOSIS — Z3A.10 10 WEEKS GESTATION OF PREGNANCY: ICD-10-CM

## 2018-10-26 DIAGNOSIS — Z34.90 EARLY STAGE OF PREGNANCY: ICD-10-CM

## 2018-10-26 DIAGNOSIS — E89.0 POSTABLATIVE HYPOTHYROIDISM: ICD-10-CM

## 2018-10-26 DIAGNOSIS — E03.9 HYPOTHYROID IN PREGNANCY, ANTEPARTUM: ICD-10-CM

## 2018-10-26 DIAGNOSIS — O99.280 HYPOTHYROID IN PREGNANCY, ANTEPARTUM: ICD-10-CM

## 2018-10-26 PROCEDURE — 96360 HYDRATION IV INFUSION INIT: CPT

## 2018-10-26 PROCEDURE — 99207 ZZC PRENATAL VISIT: CPT | Performed by: NURSE PRACTITIONER

## 2018-10-26 PROCEDURE — 25000128 H RX IP 250 OP 636: Performed by: ADVANCED PRACTICE MIDWIFE

## 2018-10-26 RX ORDER — ONDANSETRON 4 MG/1
4-8 TABLET, ORALLY DISINTEGRATING ORAL EVERY 8 HOURS PRN
Qty: 60 TABLET | Refills: 1 | Status: SHIPPED | OUTPATIENT
Start: 2018-10-26 | End: 2018-11-20

## 2018-10-26 RX ADMIN — SODIUM CHLORIDE, POTASSIUM CHLORIDE, SODIUM LACTATE AND CALCIUM CHLORIDE 1000 ML: 600; 310; 30; 20 INJECTION, SOLUTION INTRAVENOUS at 13:45

## 2018-10-26 ASSESSMENT — PAIN SCALES - GENERAL: PAINLEVEL: NO PAIN (0)

## 2018-10-26 NOTE — PROGRESS NOTES
S/O: Rocio is here today for follow up appointment d/t nausea and vomiting in pregnancy.  She is accompanied by family member.  She had IV infusion appointments twice this week, most recent appointment was today prior to visit.  She states that she continues to have fatigue, intermittent dizziness, constant nausea that worsens through out the day, and is still having vomiting 1-2 x day.  She states that she is able to keep down water in the morning and most food throughout the day; states that eating in the late afternoon/evening often leads to vomiting.  She is currently taking Zofran ODT 2-3 x day.  She states that she is currently having constipation and is taking stool softener 2 x day.     Rocio states that she is not able to perform duties at work at this time d/t daily/constant fatigue, nausea, and intermittent dizziness/headache.      She currently also has appointments 2xweek for IV infusion and 1xweek with CNMs.      VSS  Denies any vaginal bleeding  +FHTs on BS US    A:   Nausea and vomiting in pregnancy, no improvement  AMA  Hypothyroidism    P:   Continue 2x week IV infusions and weekly CNM appointments x 3 weeks.  Paperwork completed and faxed.  Off work through 11/16/18 as long as IV infusion appointments and weekly CNM visits needed  TSH with free T4 12/2018  MFM referral  Counseled on signs and symptoms dehydration, SAB, call clinic with any worsening signs and symptoms  return to clinic 1 week OB visit    Faby NELSON CNM

## 2018-10-26 NOTE — PATIENT INSTRUCTIONS
Remedies for nausea and vomiting with pregnancy      Eat small frequent meals every 2-3 hours if possible.       Avoid food at extremes of temperature and drinks with carbonation.      Eat foods that appeal to you, avoiding fats and spicy foods.      Avoid liquids with foods.  Drink liquids 30-60 minutes before or after eating and sip slowly.      Bread, pasta, crackers, potatoes, and rice tend to be tolerated the best.      Don't worry about what you eat in the first 3 months, it is more important that you can eat and keep it down.       Try flat ginger ale or ginger tea      Before rising in the morning, eat a small amount of crackers or dry toast.      Peppermint tea      Ginger is a herbal remedy for nausea and you can use it in any form.  There are ginger tablets you can purchase.  The dose 1000 mg a day in divided doses.       You may also try doxylamine (Unisom) 12.5 mg three times a day which is a sleeping medication along with Vitamin B6 25 mg three times a day.  This combination takes up to a week to work so give it some time.       Benadryl (diphenhydramine) 25-50 mg every 8 hour or Dramamine (dimenhydrinate)  mg by mouth every 4-6 hours. Both of these medication may cause some drowsiness      Other things that may help include an Accupressure band and acupuncture      If these methods fail there are many prescriptions that we can try    If you begin to vomit more than 5 or 6 times a day and feel that you are unable to keep anything down, call the [facility] at [phone]  Constipation    Constipation can be caused by many factors such as poor diet, lack of activity, and medications. Often times women experience more constipation during pregnancy due to decreased intestinal motility.    DRINK TONS OF WATER! 2.5 liters (4 pints) of water per day      Activity is very important. Increase your daily activity to at least 20-60 minutes. This increases blood flow to your gut and improves  bowel function    Limit caffeine and alcohol intake    Avoid foods you've identified as constipating    Increase fiber intake: there are ways to increase you fiber through your diet but there are also OTC agents such as Metamucil or Benfiber that you can supplement your diet with    Use probiotics such as Florastor and/or prebiotics such as oligosaccharides    Consider using an Omega 3 supplement, flaxseed and adalgisa seeds are great sources    Plan adequate time for elimination, it is most effective right after activity, and it may be beneficial to plan a consistent time daily    Food Suggestions      Eat beans, nuts, whole grain breads and cereals, oats, barley, figs, apples with skin, raisins, green leafy vegetables, fresh and dried fruits (especially grapes), prunes or prune juice    Eat popcorn nightly    Eat 2-3 salads per day    Add a pinch of cayenne pepper to food    1-2 tbsp of olive oil per day    Lemon juice and/or honey in warm water every morning before breakfast    Decrease red meat, refined white flour products like white rice, white bread and pasta    Tea infusions of: rosemary, chamomile, lemon balm, senna leaf, alfalfa, fennel seeds, lavender, cascara, dandelion, licorice root tea, psyllium seeds, marshmallow root    Other remedies      Increase Vitamin B and E (800 IU if not pregnant, 400 IU if pregnant per day) and potassium, calcium, and magnesium supplements      If these remedies fail, medications are an option as well. Please talk with your midwife if you continue to struggle with constipation. If you are pregnant please double check with us before starting any medications!

## 2018-10-26 NOTE — MR AVS SNAPSHOT
After Visit Summary   10/26/2018    Rocio Elkins    MRN: [id number]           Patient Information     Date Of Birth          1985        Visit Information        Provider Department      10/26/2018 1:30 PM  INFUSION CHAIR 1 [facility]        Today's Diagnoses     Nausea/vomiting in pregnancy    -  1    Supervision of high risk pregnancy in first trimester           Follow-ups after your visit        Your next 10 appointments already scheduled     Oct 26, 2018  3:30 PM CDT   [facility] [place] [facility] [place]    [address]  [place] [address]   [phone]            Oct 31, 2018 12:00 PM CDT   Level 2 with  INFUSION CHAIR 1   [facility] ([facility])    [facility]  [address]  [place] [address]   [phone]            Nov 01, 2018  9:10 AM CDT   [facility] [place] [facility] [place]    [address]  [place] [address]   [phone]            Nov 02, 2018 11:30 AM CDT   Level 2 with  INFUSION CHAIR 10   [facility] ([facility])    [facility]  [address]  [place] [address]   [phone]              Who to contact     If you have questions or need follow up information about today's clinic visit or your schedule please contact [facility] directly at [phone].  Normal or non-critical lab and imaging results will be communicated to you by MyChart, letter or phone within 4 business days after the clinic has received the results. If you do not hear from us within 7 days, please contact the clinic through MyChart or phone. If you  have a critical or abnormal lab result, we will notify you by phone as soon as possible.  Submit refill requests through [employer] or call your pharmacy and they will forward the refill request to us. Please allow 3 business days for your refill to be completed.          Additional Information About Your Visit        Care EveryWhere ID     This is your Care EveryWhere ID. This could be used by other organizations to access your [facility] medical records  [id number]        Your Vitals Were     Pulse Temperature Respirations Last Period          89 98.1  F (36.7  C) (Oral) 16 08/16/2018 (Exact Date)         Blood Pressure from Last 3 Encounters:   10/26/18 118/63   10/23/18 114/66   10/18/18 100/58    Weight from Last 3 Encounters:   10/18/18 64.4 kg (142 lb)   10/15/18 63.1 kg (139 lb 3.2 oz)   10/12/18 64.9 kg (143 lb)              Today, you had the following     No orders found for display       Primary Care Provider Fax #    Physician No Ref-Primary [phone]       No address on file        Equal Access to Services     : Hadii aad ku hadasho Soomaali, waaxda luqadaha, qaybta kaalmada adeegyajames, lobito hernández . So [facility] [phone].    ATENCIÓN: Si habla español, tiene a de dios disposición servicios gratuitos de asistencia lingüística. Llame al [phone].    We comply with applicable federal civil rights laws and [place] laws. We do not discriminate on the basis of race, color, national origin, age, disability, sex, sexual orientation, or gender identity.            Thank you!     Thank you for choosing [facility]  for your care. Our goal is always to provide you with excellent care. Hearing back from our patients is one way we can continue to improve our services. Please take a few minutes to complete the written survey that you may receive in the mail after your visit with us. Thank you!             Your Updated Medication List - Protect  others around you: Learn how to safely use, store and throw away your medicines at www.disposemymeds.org.          This list is accurate as of 10/26/18  2:54 PM.  Always use your most recent med list.                   Brand Name Dispense Instructions for use Diagnosis    cholecalciferol 1000 UNIT tablet    vitamin D3     Take 1,000 Units by mouth        levothyroxine 150 MCG tablet    SYNTHROID/LEVOTHROID    90 tablet    Take 1 tablet (150 mcg) by mouth daily    Postablative hypothyroidism       ondansetron 4 MG ODT tab    ZOFRAN ODT    20 tablet    Take 1-2 tablets (4-8 mg) by mouth every 8 hours as needed for nausea    Early stage of pregnancy       PREPLUS 27-1 MG Tabs      TK 1 T PO DAILY

## 2018-10-26 NOTE — PROGRESS NOTES
Infusion Nursing Note:  Rocio MAU Elkins presents today for IVF.    Patient seen by provider today: No   present during visit today: Not Applicable.    Note: N/A.    Intravenous Access:  Peripheral IV placed.    Treatment Conditions:  Not Applicable.      Post Infusion Assessment:  Patient tolerated infusion without incident.  Site patent and intact, free from redness, edema or discomfort.  No evidence of extravasations.  Access discontinued per protocol.    Discharge Plan:   Patient and/or family verbalized understanding of discharge instructions and all questions answered.  Copy of AVS reviewed with patient and/or family. Patient discharged in stable condition accompanied by: .  Departure Mode: Ambulatory.    Aga Suárez RN

## 2018-10-26 NOTE — MR AVS SNAPSHOT
After Visit Summary   10/26/2018    Rocio Elkins    MRN: [id number]           Patient Information     Date Of Birth          1985        Visit Information        Provider Department      10/26/2018 3:30 PM Faby Pérez APRN [facility]        Today's Diagnoses     Nausea/vomiting in pregnancy        Supervision of high risk pregnancy in first trimester        Early stage of pregnancy          Care Instructions    Remedies for nausea and vomiting with pregnancy      Eat small frequent meals every 2-3 hours if possible.       Avoid food at extremes of temperature and drinks with carbonation.      Eat foods that appeal to you, avoiding fats and spicy foods.      Avoid liquids with foods.  Drink liquids 30-60 minutes before or after eating and sip slowly.      Bread, pasta, crackers, potatoes, and rice tend to be tolerated the best.      Don't worry about what you eat in the first 3 months, it is more important that you can eat and keep it down.       Try flat ginger ale or ginger tea      Before rising in the morning, eat a small amount of crackers or dry toast.      Peppermint tea      Ginger is a herbal remedy for nausea and you can use it in any form.  There are ginger tablets you can purchase.  The dose 1000 mg a day in divided doses.       You may also try doxylamine (Unisom) 12.5 mg three times a day which is a sleeping medication along with Vitamin B6 25 mg three times a day.  This combination takes up to a week to work so give it some time.       Benadryl (diphenhydramine) 25-50 mg every 8 hour or Dramamine (dimenhydrinate)  mg by mouth every 4-6 hours. Both of these medication may cause some drowsiness      Other things that may help include an Accupressure band and acupuncture      If these methods fail there are many prescriptions that we can try    If you begin to vomit more than 5 or 6 times a day and feel that you are unable to keep anything  down, call the [facility] at [phone]  Constipation    Constipation can be caused by many factors such as poor diet, lack of activity, and medications. Often times women experience more constipation during pregnancy due to decreased intestinal motility.    DRINK TONS OF WATER! 2.5 liters (4 pints) of water per day      Activity is very important. Increase your daily activity to at least 20-60 minutes. This increases blood flow to your gut and improves bowel function    Limit caffeine and alcohol intake    Avoid foods you've identified as constipating    Increase fiber intake: there are ways to increase you fiber through your diet but there are also OTC agents such as Metamucil or Benfiber that you can supplement your diet with    Use probiotics such as Florastor and/or prebiotics such as oligosaccharides    Consider using an Omega 3 supplement, flaxseed and adalgisa seeds are great sources    Plan adequate time for elimination, it is most effective right after activity, and it may be beneficial to plan a consistent time daily    Food Suggestions      Eat beans, nuts, whole grain breads and cereals, oats, barley, figs, apples with skin, raisins, green leafy vegetables, fresh and dried fruits (especially grapes), prunes or prune juice    Eat popcorn nightly    Eat 2-3 salads per day    Add a pinch of cayenne pepper to food    1-2 tbsp of olive oil per day    Lemon juice and/or honey in warm water every morning before breakfast    Decrease red meat, refined white flour products like white rice, white bread and pasta    Tea infusions of: rosemary, chamomile, lemon balm, senna leaf, alfalfa, fennel seeds, lavender, cascara, dandelion, licorice root tea, psyllium seeds, marshmallow root    Other remedies      Increase Vitamin B and E (800 IU if not pregnant, 400 IU if pregnant per day) and potassium, calcium, and magnesium supplements      If these remedies fail, medications are an option as well. Please  talk with your midwife if you continue to struggle with constipation. If you are pregnant please double check with us before starting any medications!              Follow-ups after your visit        Follow-up notes from your care team     Return in about 1 week (around 11/2/2018), or if symptoms worsen or fail to improve, for Prenatal visit.      Your next 10 appointments already scheduled     Oct 31, 2018 12:00 PM CDT   Level 2 with  INFUSION CHAIR 1   [facility])    [facility]  [address]  [place] [address]   [phone]            Nov 02, 2018 10:20 AM CDT   ESTABLISHED PRENATAL with OMAR Boyd CNM   [facility] [place] ([facility] [place])    [address]  [place] [address]   [phone]            Nov 02, 2018 11:30 AM CDT   Level 2 with  INFUSION CHAIR 10   [facility] ([facility])    [facility]  [address]  [place] [address]   [phone]              Who to contact     If you have questions or need follow up information about today's clinic visit or your schedule please contact [facility] [place] directly at [phone].  Normal or non-critical lab and imaging results will be communicated to you by MyChart, letter or phone within 4 business days after the clinic has received the results. If you do not hear from us within 7 days, please contact the clinic through MyChart or phone. If you have a critical or abnormal lab result, we will notify you by phone as soon as possible.  Submit refill requests through [employer] or call your pharmacy and they will forward the refill request to us. Please allow 3 business days for your refill to be completed.          Additional Information About Your Visit        Care EveryWhere ID     This is your Care  EveryWhere ID. This could be used by other organizations to access your [facility] medical records  [id number]        Your Vitals Were     Last Period BMI (Body Mass Index)                08/16/2018 (Exact Date) 22.4 kg/m2           Blood Pressure from Last 3 Encounters:   10/26/18 110/60   10/26/18 118/63   10/23/18 114/66    Weight from Last 3 Encounters:   10/26/18 143 lb (64.9 kg)   10/18/18 142 lb (64.4 kg)   10/15/18 139 lb 3.2 oz (63.1 kg)              Today, you had the following     No orders found for display         Where to get your medicines      These medications were sent to [facility] [place] [place] - [address]  [address], [place] [address]     Phone:  [phone]     ondansetron 4 MG ODT tab          Primary Care Provider Fax #    Physician No Ref-Primary [phone]       No address on file        Equal Access to Services     MORELIA MONTANA : Hadii farzad de leon hadasho Soomaali, waaxda luqadaha, qaybta kaalmada adeegyada, waxay el hernández . So [facility] [phone].    ATENCIÓN: Si habla español, tiene a de dios disposición servicios gratuitos de asistencia lingüística. Llame al [phone].    We comply with applicable federal civil rights laws and [place] laws. We do not discriminate on the basis of race, color, national origin, age, disability, sex, sexual orientation, or gender identity.            Thank you!     Thank you for choosing [facility] [place]  for your care. Our goal is always to provide you with excellent care. Hearing back from our patients is one way we can continue to improve our services. Please take a few minutes to complete the written survey that you may receive in the mail after your visit with us. Thank you!             Your Updated Medication List - Protect others around you: Learn how to safely use, store and throw away your medicines at www.disposemymeds.org.          This list is accurate  as of 10/26/18  4:11 PM.  Always use your most recent med list.                   Brand Name Dispense Instructions for use Diagnosis    cholecalciferol 1000 UNIT tablet    vitamin D3     Take 1,000 Units by mouth        levothyroxine 150 MCG tablet    SYNTHROID/LEVOTHROID    90 tablet    Take 1 tablet (150 mcg) by mouth daily    Postablative hypothyroidism       ondansetron 4 MG ODT tab    ZOFRAN ODT    60 tablet    Take 1-2 tablets (4-8 mg) by mouth every 8 hours as needed for nausea    Early stage of pregnancy       PREPLUS 27-1 MG Tabs      TK 1 T PO DAILY

## 2018-10-31 ENCOUNTER — INFUSION THERAPY VISIT (OUTPATIENT)
Dept: INFUSION THERAPY | Facility: CLINIC | Age: 33
End: 2018-10-31
Attending: OPHTHALMOLOGY
Payer: COMMERCIAL

## 2018-10-31 VITALS
SYSTOLIC BLOOD PRESSURE: 108 MMHG | OXYGEN SATURATION: 99 % | DIASTOLIC BLOOD PRESSURE: 68 MMHG | TEMPERATURE: 98 F | HEART RATE: 90 BPM | RESPIRATION RATE: 16 BRPM

## 2018-10-31 DIAGNOSIS — O09.91 SUPERVISION OF HIGH RISK PREGNANCY IN FIRST TRIMESTER: ICD-10-CM

## 2018-10-31 DIAGNOSIS — O21.9 NAUSEA/VOMITING IN PREGNANCY: Primary | ICD-10-CM

## 2018-10-31 PROCEDURE — 96360 HYDRATION IV INFUSION INIT: CPT

## 2018-10-31 PROCEDURE — 25000128 H RX IP 250 OP 636: Performed by: ADVANCED PRACTICE MIDWIFE

## 2018-10-31 RX ADMIN — SODIUM CHLORIDE, POTASSIUM CHLORIDE, SODIUM LACTATE AND CALCIUM CHLORIDE 1000 ML: 600; 310; 30; 20 INJECTION, SOLUTION INTRAVENOUS at 12:28

## 2018-10-31 ASSESSMENT — PAIN SCALES - GENERAL: PAINLEVEL: NO PAIN (0)

## 2018-10-31 NOTE — MR AVS SNAPSHOT
After Visit Summary   10/31/2018    Rocio Eklins    MRN: [id number]           Patient Information     Date Of Birth          1985        Visit Information        Provider Department      10/31/2018 12:00 PM  INFUSION CHAIR 1 [facility]        Today's Diagnoses     Nausea/vomiting in pregnancy    -  1    Supervision of high risk pregnancy in first trimester           Follow-ups after your visit        Your next 10 appointments already scheduled     Nov 02, 2018 10:20 AM CDT   [facility]ABL[facility] [place] [facility] [place] [facility] [place]    [address]  [facility] [address]   [phone]            Nov 02, 2018 11:30 AM CDT   Level 2 with  INFUSION CHAIR 10   [facility]    [facility]  [address]  [facility] [address]   [phone]            Dec 27, 2018  1:30 PM CST   Genetic Counseling with  GEN COUNSELOR 1   [facility])    [address] [place] [address] [address]  [facility] [address]   [phone]            Dec 27, 2018  2:15 PM CST   MFM US COMP with [id number]   [facility] Ul[facility])    [address] [place] [address] [address]  [facility] [address]   [phone]           Wear comfortable clothes and leave your valuables at home.            Dec 27, 2018  2:45 PM CST   Radiology MD with  BENNIE MOLINA   [facility])    [address] [place] [address]   [phone]           Please arrive at the time given for your first appointment. This visit is used internally to schedule the physician's time during your ultrasound.              Who to contact     If you have questions or  need follow up information about today's clinic visit or your schedule please contact [facility] directly at [phone].  Normal or non-critical lab and imaging results will be communicated to you by MyChart, letter or phone within 4 business days after the clinic has received the results. If you do not hear from us within 7 days, please contact the clinic through MyChart or phone. If you have a critical or abnormal lab result, we will notify you by phone as soon as possible.  Submit refill requests through [employer] or call your pharmacy and they will forward the refill request to us. Please allow 3 business days for your refill to be completed.          Additional Information About Your Visit        Care EveryWhere ID     This is your Care EveryWhere ID. This could be used by other organizations to access your [place] medical records  [id number]        Your Vitals Were     Pulse Temperature Respirations Last Period Pulse Oximetry       90 98  F (36.7  C) 16 08/16/2018 (Exact Date) 99%        Blood Pressure from Last 3 Encounters:   10/31/18 108/68   10/26/18 110/60   10/26/18 118/63    Weight from Last 3 Encounters:   10/26/18 64.9 kg (143 lb)   10/18/18 64.4 kg (142 lb)   10/15/18 63.1 kg (139 lb 3.2 oz)              Today, you had the following     No orders found for display       Primary Care Provider Fax #    Physician No Ref-Primary [phone]       No address on file        Equal Access to Services     MORELIA MONTANA : Hadii farzad ku hadasho Sominnaali, waaxda luqadaha, qaybta kaalmada adeegyada, lobito hernández . So [facility] [phone].    ATENCIÓN: Si habla español, tiene a de dios disposición servicios gratuitos de asistencia lingüística. Llame al [phone].    We comply with applicable federal civil rights laws and [place] laws. We do not discriminate on the basis of race, color, national origin, age, disability, sex, sexual orientation, or gender  identity.            Thank you!     Thank you for choosing [facility]  for your care. Our goal is always to provide you with excellent care. Hearing back from our patients is one way we can continue to improve our services. Please take a few minutes to complete the written survey that you may receive in the mail after your visit with us. Thank you!             Your Updated Medication List - Protect others around you: Learn how to safely use, store and throw away your medicines at www.disposemymeds.org.          This list is accurate as of 10/31/18  1:44 PM.  Always use your most recent med list.                   Brand Name Dispense Instructions for use Diagnosis    cholecalciferol 1000 UNIT tablet    vitamin D3     Take 1,000 Units by mouth        levothyroxine 150 MCG tablet    SYNTHROID/LEVOTHROID    90 tablet    Take 1 tablet (150 mcg) by mouth daily    Postablative hypothyroidism       ondansetron 4 MG ODT tab    ZOFRAN ODT    60 tablet    Take 1-2 tablets (4-8 mg) by mouth every 8 hours as needed for nausea    Early stage of pregnancy       PREPLUS 27-1 MG Tabs      TK 1 T PO DAILY

## 2018-10-31 NOTE — PROGRESS NOTES
Infusion Nursing Note:  Rocio Elkins presents today for fluids.    Patient seen by provider today: No   present during visit today: Not Applicable.    Note: N/A.    Intravenous Access:  Peripheral IV placed.    Treatment Conditions:  Not Applicable.      Post Infusion Assessment:  Patient tolerated infusion without incident.  Blood return noted pre and post infusion.  Site patent and intact, free from redness, edema or discomfort.  No evidence of extravasations.  Access discontinued per protocol.    Discharge Plan:   AVS to patient via MYCHART.  Patient will return as scheduled for next appointment.   Patient discharged in stable condition accompanied by: self.  Departure Mode: Ambulatory.    Kiki Richardson RN

## 2018-11-02 ENCOUNTER — TELEPHONE (OUTPATIENT)
Dept: NURSING | Facility: CLINIC | Age: 33
End: 2018-11-02

## 2018-11-02 ENCOUNTER — PRENATAL OFFICE VISIT (OUTPATIENT)
Dept: MIDWIFE SERVICES | Facility: CLINIC | Age: 33
End: 2018-11-02
Payer: COMMERCIAL

## 2018-11-02 ENCOUNTER — INFUSION THERAPY VISIT (OUTPATIENT)
Dept: INFUSION THERAPY | Facility: CLINIC | Age: 33
End: 2018-11-02
Payer: COMMERCIAL

## 2018-11-02 ENCOUNTER — HOSPITAL ENCOUNTER (EMERGENCY)
Facility: CLINIC | Age: 33
Discharge: HOME | End: 2018-11-02
Attending: EMERGENCY MEDICINE | Admitting: EMERGENCY MEDICINE
Payer: COMMERCIAL

## 2018-11-02 VITALS
TEMPERATURE: 98.1 F | RESPIRATION RATE: 16 BRPM | SYSTOLIC BLOOD PRESSURE: 104 MMHG | HEART RATE: 85 BPM | DIASTOLIC BLOOD PRESSURE: 66 MMHG

## 2018-11-02 VITALS — DIASTOLIC BLOOD PRESSURE: 60 MMHG | SYSTOLIC BLOOD PRESSURE: 94 MMHG | BODY MASS INDEX: 21.3 KG/M2 | WEIGHT: 136 LBS

## 2018-11-02 VITALS
TEMPERATURE: 99.4 F | OXYGEN SATURATION: 100 % | DIASTOLIC BLOOD PRESSURE: 64 MMHG | RESPIRATION RATE: 17 BRPM | SYSTOLIC BLOOD PRESSURE: 101 MMHG

## 2018-11-02 DIAGNOSIS — O21.9 NAUSEA/VOMITING IN PREGNANCY: ICD-10-CM

## 2018-11-02 DIAGNOSIS — K59.00 CONSTIPATION, UNSPECIFIED CONSTIPATION TYPE: ICD-10-CM

## 2018-11-02 DIAGNOSIS — O09.91 SUPERVISION OF HIGH RISK PREGNANCY IN FIRST TRIMESTER: ICD-10-CM

## 2018-11-02 DIAGNOSIS — O21.9 NAUSEA/VOMITING IN PREGNANCY: Primary | ICD-10-CM

## 2018-11-02 DIAGNOSIS — R11.2 NAUSEA AND VOMITING, INTRACTABILITY OF VOMITING NOT SPECIFIED, UNSPECIFIED VOMITING TYPE: ICD-10-CM

## 2018-11-02 PROCEDURE — 96360 HYDRATION IV INFUSION INIT: CPT

## 2018-11-02 PROCEDURE — 99283 EMERGENCY DEPT VISIT LOW MDM: CPT

## 2018-11-02 PROCEDURE — 25000132 ZZH RX MED GY IP 250 OP 250 PS 637: Performed by: EMERGENCY MEDICINE

## 2018-11-02 PROCEDURE — 25000128 H RX IP 250 OP 636: Performed by: ADVANCED PRACTICE MIDWIFE

## 2018-11-02 PROCEDURE — 99207 ZZC PRENATAL VISIT: CPT | Performed by: NURSE PRACTITIONER

## 2018-11-02 RX ORDER — DOXYLAMINE SUCCINATE AND PYRIDOXINE HYDROCHLORIDE, DELAYED RELEASE TABLETS 10 MG/10 MG 10; 10 MG/1; MG/1
2 TABLET, DELAYED RELEASE ORAL AT BEDTIME
Qty: 120 TABLET | Refills: 1 | Status: SHIPPED | OUTPATIENT
Start: 2018-11-02 | End: 2018-11-20

## 2018-11-02 RX ADMIN — SODIUM CHLORIDE, POTASSIUM CHLORIDE, SODIUM LACTATE AND CALCIUM CHLORIDE 1000 ML: 600; 310; 30; 20 INJECTION, SOLUTION INTRAVENOUS at 11:26

## 2018-11-02 RX ADMIN — DOCUSATE SODIUM 286 ML: 10 LIQUID ORAL at 18:39

## 2018-11-02 ASSESSMENT — ENCOUNTER SYMPTOMS
VOMITING: 1
NAUSEA: 1
CONSTIPATION: 1

## 2018-11-02 NOTE — PATIENT INSTRUCTIONS
Remedies for nausea and vomiting with pregnancy      Eat small frequent meals every 2-3 hours if possible.       Avoid food at extremes of temperature and drinks with carbonation.      Eat foods that appeal to you, avoiding fats and spicy foods.      Avoid liquids with foods.  Drink liquids 30-60 minutes before or after eating and sip slowly.      Bread, pasta, crackers, potatoes, and rice tend to be tolerated the best.      Don't worry about what you eat in the first 3 months, it is more important that you can eat and keep it down.       Try flat ginger ale or ginger tea      Before rising in the morning, eat a small amount of crackers or dry toast.      Peppermint tea      Ginger is a herbal remedy for nausea and you can use it in any form.  There are ginger tablets you can purchase.  The dose 1000 mg a day in divided doses.       You may also try doxylamine (Unisom) 12.5 mg three times a day which is a sleeping medication along with Vitamin B6 25 mg three times a day.  This combination takes up to a week to work so give it some time.       Benadryl (diphenhydramine) 25-50 mg every 8 hour or Dramamine (dimenhydrinate)  mg by mouth every 4-6 hours. Both of these medication may cause some drowsiness      Other things that may help include an Accupressure band and acupuncture      If these methods fail there are many prescriptions that we can try    If you begin to vomit more than 5 or 6 times a day and feel that you are unable to keep anything down, call the [facility] at [phone]                 Diet During Pregnancy  In this discussion you will learn why you need a well-balanced diet while you are pregnant and what foods you should eat. You will also find out foods you should avoid and foods that will help some of the unpleasant side effects of pregnancy.   What foods do I need to eat?   Eating regular, well-balanced meals is more important when you are pregnant than at any other time of  your life. What you eat provides food for your baby as well as yourself. The best time to begin eating a healthy, balanced diet is before you become pregnant.   You need about 300 more food calories a day than when you were not pregnant. Your healthcare provider will suggest a range of weight that you should gain. The usual recommended gain is about 20 to 35 pounds.   Your need for protein while pregnant is about 60 grams (g) a day. Many women already eat this amount or more of protein daily when they are not pregnant. However, if you are vegetarian or eat little meat or dairy, you may not be getting enough protein in your diet. You also need more vitamins and minerals, especially folic acid and iron. These nutrients are important for your baby's growth and development. They give your baby strong bones and teeth, healthy skin, and a healthy body.   Foods that are excellent sources of protein and vitamins are:   beans and peas   nuts   peanut butter   eggs   meat   fish   poultry   cheese, milk, and yogurt   Good sources of folic acid (also called folate) are:   leafy green vegetables, such as jaxon greens, spinach, kale, and mustard greens   broccoli   asparagus   fortified breakfast cereals and grains   beans   oranges and strawberries   yellow squash   tomato juice   Foods rich in iron are:   lean red meats, pork, chicken, and fish   fortified cereals   dried fruit   leafy green vegetables   beans   eggs   liver   kidneys   whole-grain or enriched bread   If you need advice on what foods to eat for a healthy, balanced diet, ask your healthcare provider to refer you to a dietician. If you need financial help buying nutritious foods, a government program called the Special Supplemental Food Program for Women, Infants, and Children (WIC) can help you buy foods like milk, eggs, cheese, and bread.   How do I know if I am eating a balanced diet?   Eat a variety of whole, fresh foods. Use the following as a guideline  for what you should eat every day.   Meat, poultry, fish, beans, or eggs   You need 2 to 3 servings every day.   One serving of meat is 2 to 3 ounces (oz) of lean meat, poultry, or fish.   Single servings of other foods in this food group are 1 cup cooked beans, 2 eggs, 4 egg whites, 1/2 cup tofu, 1/2 cup nuts, or 1/4 cup peanut butter. Note that nuts and peanut butter, although healthy, are very high in calories and should be eaten in moderation, especially if you are gaining more weight than your healthcare provider recommends.   Grains, rice, pasta, bread   It is good to have 6 to 9 servings every day.   One serving is 1/2 cup pasta, 1/2 cup cooked cereal, or 1 slice of bread.   Choose less-processed, higher-fiber whole grains more often.   Fruits   You need 3 or more servings of fruits every day.   One serving of fruit is 1 medium apple, 1 medium banana, 1/2 cup chopped fruit, or 3/4 cup fruit juice.   Vegetables   You need 3 or more servings of vegetables every day.   In general, 1 cup of cooked or raw vegetables or vegetable juice or 2 cups of raw leafy vegetables would be considered a serving.   Milk, cheese, or yogurt   You need 3 to 4 servings every day.   One serving is 1 cup of milk, 1 cup of yogurt, 1 and 1/2 ounces of hard cheese, or 2 ounces of processed cheese. It's best to choose low-fat or nonfat dairy products.   What if I am gaining more weight than my provider recommends?   Keep eating the recommended servings for all of the food groups, but make lower fat choices.   Avoid high-fat, high-sugar treats and high-calorie drinks, such as soda pop or large servings of juice.   Get enough exercise at the level your healthcare provider recommends.   For a more individualized approach to meal planning during your pregnancy, go to MyPyramid Plan for Moms at http://www.mypyramid.gov/mypyramidmoms/pyramidmoms_plan.aspx

## 2018-11-02 NOTE — TELEPHONE ENCOUNTER
Pt is calling for assistance in what to do that she is constipated. Reviewed medications on teaching list for safe medications in pregnancy.  Pt will have someone get her glycerin  suppositories or TWE  Home kit.  she will call back with any further questions.

## 2018-11-02 NOTE — ED AVS SNAPSHOT
"  Emergency Department    [address] [facility] [address]    Phone:  [phone]    Fax:  [phone]                                       Rocio Elkins   MRN: [id number]    Department:   Emergency Department   Date of Visit:  11/2/2018           Patient Information     Date Of Birth          1985        Your diagnoses for this visit were:     Constipation, unspecified constipation type     Nausea and vomiting, intractability of vomiting not specified, unspecified vomiting type        You were seen by Brielle Bowen MD.      Follow-up Information     Follow up with Jevon Montero APRN CNM. Schedule an appointment as soon as possible for a visit in 3 days.    Specialty:  Midwives    Why:  As needed    Contact information:    [facility]  [address] [id number] [phone]          Discharge Instructions       *You may resume diet and activities as tolerated.  Get plenty of rest.  Eat small meals frequently and drink plenty of fluids.  *Take medications as prescribed.  Recommend B6 and doxylamine (12.5 mg by mouth at night - available over the counter as \"unisom\").  Zofran for additional relief of nausea. Continue your current medications  *Follow-up with OB/Gyn in the next 1 week.  *Return if you are unable to keep fluids down, develop abdominal pain or vaginal bleeding, faint or feel like you will faint or become worse in any way.    Discharge Instructions  Hyperemesis Gravidarum    You were seen today for hyperemesis gravidarum. It is very common to have some nausea (sick to your stomach) and vomiting (throwing up) in early pregnancy (sometimes called  morning sickness,  although it happens at all times of day.) In hyperemesis gravidarum, however, the vomiting is much more severe, so you may become dehydrated and lose weight. We have treated you today to manage your symptoms and rehydrate you. Often these symptoms persist during the first " trimester of pregnancy before improving so you will likely need ongoing care.    Generally, every Emergency Department visit should have a follow-up clinic visit with either a primary or a specialty clinic/provider. Please follow-up as instructed by your emergency provider today.    Return to the Emergency Department if:    You feel dizzy or light headed when standing up.    You are vomiting and cannot keep fluids or medications down.    You urinate (pee) much less than normal.    You feel much more ill or develop new symptoms.    What can I do to help myself?    Be sure to drink plenty of cold clear fluids and suck on popsicles between meals. Pedialyte is a good rehydration liquid but many people don t like the taste so sport drinks are a good alternative (Gatorade). Soda is often excessively sweat but Ginger Ale is sometimes recommended for this condition.    Eat as soon as you feel hungry, or even before you feel hungry. Try to keep something on your stomach.     Avoid strong smells, or other things that make you feel nauseated.    Snack often and eat small meals high in protein or carbohydrates such as crackers, bread, nuts, and low-fat yogurt. Avoid spicy, greasy, or acid foods.    Avoid lying down right after you eat.    Take your prenatal vitamins at bedtime with a snack instead of taking them in the morning.    Ginger may make you feel better. Try eating a small piece of ginger, or having ginger-flavored hard candies.    Acupressure wrist bands are also helpful to some people.    Treatment:    Nausea medication. Your provider may send you home with a prescription medicine, like Compazine  (prochlorperazine) or Reglan  (metoclopramide). Zofran  (ondansetron) is sometimes used as well.    Your provider may recommend that you take non-prescription nausea medicines, like Dramamine  (dimenhydrinate), Unisom  (doxylamine), or Vitamin B6 (pyridoxine).    Vitamins. Make sure your prenatal vitamins have 400  micrograms of Folic acid and have vitamin B6, since this may help your nausea and vomiting.   If you were given a prescription for medicine here today, be sure to read all of the information (including the package insert) that comes with your prescription.  This will include important information about the medicine, its side effects, and any warnings that you need to know about.  The pharmacist who fills the prescription can provide more information and answer questions you may have about the medicine.  If you have questions or concerns that the pharmacist cannot address, please call or return to the Emergency Department.    Remember that you can always come back to the Emergency Department if you are not able to see your regular provider in the amount of time listed above, if you get any new symptoms, or if there is anything that worries you.    Discharge Instructions  Constipation  Constipation can cause severe cramping pain and your provider thinks this might be the cause of your abdominal pain (belly pain) today.  People usually recognize that they are constipated because they have difficulty having bowel movements, are not having bowel movements frequently enough, or are not having large enough bowel movements. Sometimes, especially in children or older people, you do not recognize that you are constipated until it becomes severe. The most common causes of constipation are a lack of exercise and not eating enough fruits, vegetables, and whole grains. Constipation can also be a side effect of medications, such as narcotics, or may be caused by a disease of the digestive system.    Generally, every Emergency Department visit should have a follow-up clinic visit with either a primary or a specialty clinic/provider. Please follow-up as instructed by your emergency provider today. Sometimes, chronic constipation requires further testing to determine the cause. If you are over 50 years old, you may need a colonoscopy  if you have not had one before.     Return to the Emergency Department if:    Your abdominal pain worsens or does not improve after a bowel movement.    You become very weak.    You get a temperature above 102oF or as directed by your provider.    You have blood in your stools (bright red or black, tarry stools).    You keep vomiting (throwing up) or cannot drink liquids.    Your see blood when you vomit.    Your stomach gets bloated or bigger.    You have new symptoms or anything that worries you.    What can I do to help myself?    If your provider gave you a cathartic medication, like magnesium citrate or GoLytely  (polyethylene glycol), you can expect to have cramps and gas pains after taking it. You can expect to have a number of bowel movements and even diarrhea (loose or watery stools) in the course of clearing your bowels.  You will know your bowels have been cleaned out after you pass clear liquid. The cramps and gas should let up after you have emptied your bowels. You may want to wait until morning to take this type of medication so you aren t up in the night.     Sometimes instead of cathartics, we recommend laxatives like milk of magnesia to move your bowels more slowly, or an enema to help the bowels to move. Read and follow the package directions, or follow your provider s instructions.    Once you have become very constipated, it takes time for your bowels to return to normal and you need to be very careful to prevent becoming constipated again. Take a laxative if you do not move your bowels at least every two days.       Eat foods that have a lot of fiber. Good choices are fruits, vegetables, prune juice, apple juice, and high fiber cereal. Limit dairy products such as milk and cheese, since these can make constipation worse.     Drink plenty of water.     When you feel the need to go to the bathroom, go to the bathroom. Do not hold it.    Miralax , Metamucil , Colace , Senna or fiber supplements  can be used daily.  Miralax  daily is often the best choice for children.  If you were given a prescription for medicine here today, be sure to read all of the information (including the package insert) that comes with your prescription.  This will include important information about the medicine, its side effects, and any warnings that you need to know about.  The pharmacist who fills the prescription can provide more information and answer questions you may have about the medicine.  If you have questions or concerns that the pharmacist cannot address, please call or return to the Emergency Department.   Remember that you can always come back to the Emergency Department if you are not able to see your regular provider in the amount of time listed above, if you get any new symptoms, or if there is anything that worries you.      Your next 10 appointments already scheduled     Dec 27, 2018  1:30 PM CST   Genetic Counseling with CONCHITA GEN COUNSELOR 1   [facility])    [address] [place] [id number] [phone]            Dec 27, 2018  2:15 PM CST   BROOKSM US COMP with [id number]   [facility]etal [facility] Ultrasound - [facility])    [address] [place] [id number] [phone]           Wear comfortable clothes and leave your valuables at home.            Dec 27, 2018  2:45 PM CST   Radiology MD with  BENNIE MOLINA   [facility])    [address] [place] [id number] [phone]           Please arrive at the time given for your first appointment. This visit is used internally to schedule the physician's time during your ultrasound.              24 Hour Appointment Hotline       To make an appointment at any [facility], call [phone] ([phone]). If you don't have a family doctor  or clinic, we will help you find one. [facility] are conveniently located to serve the needs of you and your family.             Review of your medicines      START taking        Dose / Directions Last dose taken    Doxylamine-Pyridoxine 10-10 MG Tbec   Dose:  2 tablet   Quantity:  120 tablet        Take 2 tablets by mouth At Bedtime Take two tablets daily at bedtime. If symptoms are not adequately controlled, the dose can be increased to a maximum recommended dose of four tablets daily (one in the morning, one mid-afternoon and two at bedtime).   Refills:  1          Our records show that you are taking the medicines listed below. If these are incorrect, please call your family doctor or clinic.        Dose / Directions Last dose taken    cholecalciferol 1000 UNIT tablet   Commonly known as:  vitamin D3   Dose:  1000 Units        Take 1,000 Units by mouth   Refills:  0        levothyroxine 150 MCG tablet   Commonly known as:  SYNTHROID/LEVOTHROID   Dose:  150 mcg   Quantity:  90 tablet        Take 1 tablet (150 mcg) by mouth daily   Refills:  1        ondansetron 4 MG ODT tab   Commonly known as:  ZOFRAN ODT   Dose:  4-8 mg   Quantity:  60 tablet        Take 1-2 tablets (4-8 mg) by mouth every 8 hours as needed for nausea   Refills:  1        PREPLUS 27-1 MG Tabs        TK 1 T PO DAILY   Refills:  1                Prescriptions were sent or printed at these locations (1 Prescription)                   Other Prescriptions                Printed at Department/Unit printer (1 of 1)         Doxylamine-Pyridoxine 10-10 MG TBEC                Orders Needing Specimen Collection     None      Pending Results     No orders found from 10/31/2018 to 11/3/2018.            Pending Culture Results     No orders found from 10/31/2018 to 11/3/2018.            Pending Results Instructions     If you had any lab results that were not finalized at the time of your Discharge, you can call the ED Lab Result RN at [phone].  You will be contacted by this team for any positive Lab results or changes in treatment. The nurses are available 7 days a week from 10A to 6:30P.  You can leave a message 24 hours per day and they will return your call.        Test Results From Your Hospital Stay               Clinical Quality Measure: Blood Pressure Screening     Your blood pressure was checked while you were in the emergency department today. The last reading we obtained was  BP: 101/64 . Please read the guidelines below about what these numbers mean and what you should do about them.  If your systolic blood pressure (the top number) is less than 120 and your diastolic blood pressure (the bottom number) is less than 80, then your blood pressure is normal. There is nothing more that you need to do about it.  If your systolic blood pressure (the top number) is 120-139 or your diastolic blood pressure (the bottom number) is 80-89, your blood pressure may be higher than it should be. You should have your blood pressure rechecked within a year by a primary care provider.  If your systolic blood pressure (the top number) is 140 or greater or your diastolic blood pressure (the bottom number) is 90 or greater, you may have high blood pressure. High blood pressure is treatable, but if left untreated over time it can put you at risk for heart attack, stroke, or kidney failure. You should have your blood pressure rechecked by a primary care provider within the next 4 weeks.  If your provider in the emergency department today gave you specific instructions to follow-up with your doctor or provider even sooner than that, you should follow that instruction and not wait for up to 4 weeks for your follow-up visit.        Thank you for choosing [facility]       Thank you for choosing [facility] for your care. Our goal is always to provide you with excellent care. Hearing back from our patients is one way we can continue to improve our services. Please take a few  minutes to complete the written survey that you may receive in the mail after you visit with us. Thank you!        Care EveryWhere ID     This is your Care EveryWhere ID. This could be used by other organizations to access your [facility] medical records  [id number]        Equal Access to Services     MORELIA [place] : Barrett Levine, may krause, saroj paul, lobito walter. [facility] [phone].    ATENCIÓN: Si habla español, tiene a de dios disposición servicios gratuitos de asistencia lingüística. Llame al [phone].    We comply with applicable federal civil rights laws and [place] laws. We do not discriminate on the basis of race, color, national origin, age, disability, sex, sexual orientation, or gender identity.            After Visit Summary       This is your record. Keep this with you and show to your community pharmacist(s) and doctor(s) at your next visit.

## 2018-11-02 NOTE — ED AVS SNAPSHOT
Emergency Department    [address] [place] [address]    Phone:  [phone]    Fax:  [phone]                                       Rocio Elkins   MRN: [id number]    Department:   Emergency Department   Date of Visit:  11/2/2018           After Visit Summary Signature Page     I have received my discharge instructions, and my questions have been answered. I have discussed any challenges I see with this plan with the nurse or doctor.    ..........................................................................................................................................  Patient/Patient Representative Signature      ..........................................................................................................................................  Patient Representative Print Name and Relationship to Patient    ..................................................               ................................................  Date                                   Time    ..........................................................................................................................................  Reviewed by Signature/Title    ...................................................              ..............................................  Date                                               Time          22EPIC Rev 08/18

## 2018-11-02 NOTE — PROGRESS NOTES
Infusion Nursing Note:  Rocio MAU Elkins presents today for IVF.    Patient seen by provider today: No   present during visit today: Not Applicable.    Note: N/A.    Intravenous Access:  Peripheral IV placed.    Treatment Conditions:  Not Applicable.      Post Infusion Assessment:  Patient tolerated infusion without incident.  Site patent and intact, free from redness, edema or discomfort.  No evidence of extravasations.  Access discontinued per protocol.    Discharge Plan:   AVS to patient via MYCHART.  Patient will return as h prn for next appointment.   Patient discharged in stable condition accompanied by: self.  Departure Mode: Ambulatory.    Allen Sims RN

## 2018-11-02 NOTE — MR AVS SNAPSHOT
After Visit Summary   11/2/2018    Rocio Elkins    MRN: [id number]           Patient Information     Date Of Birth          1985        Visit Information        Provider Department      11/2/2018 10:20 AM Faby Pérez APRN CNM [facility]        Today's Diagnoses     Nausea/vomiting in pregnancy        Supervision of high risk pregnancy in first trimester          Care Instructions    Remedies for nausea and vomiting with pregnancy      Eat small frequent meals every 2-3 hours if possible.       Avoid food at extremes of temperature and drinks with carbonation.      Eat foods that appeal to you, avoiding fats and spicy foods.      Avoid liquids with foods.  Drink liquids 30-60 minutes before or after eating and sip slowly.      Bread, pasta, crackers, potatoes, and rice tend to be tolerated the best.      Don't worry about what you eat in the first 3 months, it is more important that you can eat and keep it down.       Try flat ginger ale or ginger tea      Before rising in the morning, eat a small amount of crackers or dry toast.      Peppermint tea      Ginger is a herbal remedy for nausea and you can use it in any form.  There are ginger tablets you can purchase.  The dose 1000 mg a day in divided doses.       You may also try doxylamine (Unisom) 12.5 mg three times a day which is a sleeping medication along with Vitamin B6 25 mg three times a day.  This combination takes up to a week to work so give it some time.       Benadryl (diphenhydramine) 25-50 mg every 8 hour or Dramamine (dimenhydrinate)  mg by mouth every 4-6 hours. Both of these medication may cause some drowsiness      Other things that may help include an Accupressure band and acupuncture      If these methods fail there are many prescriptions that we can try    If you begin to vomit more than 5 or 6 times a day and feel that you are unable to keep anything down, call the [facility] for  Women at [phone]                 Diet During Pregnancy  In this discussion you will learn why you need a well-balanced diet while you are pregnant and what foods you should eat. You will also find out foods you should avoid and foods that will help some of the unpleasant side effects of pregnancy.   What foods do I need to eat?   Eating regular, well-balanced meals is more important when you are pregnant than at any other time of your life. What you eat provides food for your baby as well as yourself. The best time to begin eating a healthy, balanced diet is before you become pregnant.   You need about 300 more food calories a day than when you were not pregnant. Your healthcare provider will suggest a range of weight that you should gain. The usual recommended gain is about 20 to 35 pounds.   Your need for protein while pregnant is about 60 grams (g) a day. Many women already eat this amount or more of protein daily when they are not pregnant. However, if you are vegetarian or eat little meat or dairy, you may not be getting enough protein in your diet. You also need more vitamins and minerals, especially folic acid and iron. These nutrients are important for your baby's growth and development. They give your baby strong bones and teeth, healthy skin, and a healthy body.   Foods that are excellent sources of protein and vitamins are:   beans and peas   nuts   peanut butter   eggs   meat   fish   poultry   cheese, milk, and yogurt   Good sources of folic acid (also called folate) are:   leafy green vegetables, such as jaxon greens, spinach, kale, and mustard greens   broccoli   asparagus   fortified breakfast cereals and grains   beans   oranges and strawberries   yellow squash   tomato juice   Foods rich in iron are:   lean red meats, pork, chicken, and fish   fortified cereals   dried fruit   leafy green vegetables   beans   eggs   liver   kidneys   whole-grain or enriched bread   If you need advice on what  foods to eat for a healthy, balanced diet, ask your healthcare provider to refer you to a dietician. If you need financial help buying nutritious foods, a government program called the Special Supplemental Food Program for Women, Infants, and Children (WIC) can help you buy foods like milk, eggs, cheese, and bread.   How do I know if I am eating a balanced diet?   Eat a variety of whole, fresh foods. Use the following as a guideline for what you should eat every day.   Meat, poultry, fish, beans, or eggs   You need 2 to 3 servings every day.   One serving of meat is 2 to 3 ounces (oz) of lean meat, poultry, or fish.   Single servings of other foods in this food group are 1 cup cooked beans, 2 eggs, 4 egg whites, 1/2 cup tofu, 1/2 cup nuts, or 1/4 cup peanut butter. Note that nuts and peanut butter, although healthy, are very high in calories and should be eaten in moderation, especially if you are gaining more weight than your healthcare provider recommends.   Grains, rice, pasta, bread   It is good to have 6 to 9 servings every day.   One serving is 1/2 cup pasta, 1/2 cup cooked cereal, or 1 slice of bread.   Choose less-processed, higher-fiber whole grains more often.   Fruits   You need 3 or more servings of fruits every day.   One serving of fruit is 1 medium apple, 1 medium banana, 1/2 cup chopped fruit, or 3/4 cup fruit juice.   Vegetables   You need 3 or more servings of vegetables every day.   In general, 1 cup of cooked or raw vegetables or vegetable juice or 2 cups of raw leafy vegetables would be considered a serving.   Milk, cheese, or yogurt   You need 3 to 4 servings every day.   One serving is 1 cup of milk, 1 cup of yogurt, 1 and 1/2 ounces of hard cheese, or 2 ounces of processed cheese. It's best to choose low-fat or nonfat dairy products.   What if I am gaining more weight than my provider recommends?   Keep eating the recommended servings for all of the food groups, but make lower fat choices.    Avoid high-fat, high-sugar treats and high-calorie drinks, such as soda pop or large servings of juice.   Get enough exercise at the level your healthcare provider recommends.   For a more individualized approach to meal planning during your pregnancy, go to MyPyramid Plan for Moms at http://www.mypyramid.gov/mypyramidmoms/pyramidmoms_plan.aspx          Follow-ups after your visit        Follow-up notes from your care team     Return in about 1 week (around 11/9/2018), or if symptoms worsen or fail to improve, for Prenatal visit.      Your next 10 appointments already scheduled     Nov 02, 2018 11:30 AM CDT   Level 2 with  INFUSION CHAIR 10   [facility] ([facility])    [facility] [place]  [address]  [place] [address]   [phone]            Dec 27, 2018  1:30 PM CST   Genetic Counseling with  GEN COUNSELOR 1   [facility] Maternal Fetal Medicine [facility])    [address] [place] [id number]   [phone]            Dec 27, 2018  2:15 PM CST   MFM US COMP with [id number]   [facility])    [address] [place] [id number] [phone]           Wear comfortable clothes and leave your valuables at home.            Dec 27, 2018  2:45 PM CST   Radiology MD with  BROOKSM MD   [facility] Maternal Fetal Medicine [facility])    [address] [place] [id number] [phone]           Please arrive at the time given for your first appointment. This visit is used internally to schedule the physician's time during your ultrasound.              Who to contact     If you have questions or need follow up information about today's clinic visit or your schedule please contact [facility] [place] directly at [phone].  Normal or  non-critical lab and imaging results will be communicated to you by MyChart, letter or phone within 4 business days after the clinic has received the results. If you do not hear from us within 7 days, please contact the clinic through MyChart or phone. If you have a critical or abnormal lab result, we will notify you by phone as soon as possible.  Submit refill requests through [employer] or call your pharmacy and they will forward the refill request to us. Please allow 3 business days for your refill to be completed.          Additional Information About Your Visit        Care EveryWhere ID     This is your Care EveryWhere ID. This could be used by other organizations to access your [place] medical records  [id number]        Your Vitals Were     Last Period Breastfeeding? BMI (Body Mass Index)             08/16/2018 (Exact Date) No 21.3 kg/m2          Blood Pressure from Last 3 Encounters:   11/02/18 94/60   10/31/18 108/68   10/26/18 110/60    Weight from Last 3 Encounters:   11/02/18 136 lb (61.7 kg)   10/26/18 143 lb (64.9 kg)   10/18/18 142 lb (64.4 kg)              Today, you had the following     No orders found for display       Primary Care Provider Fax #    Physician No Ref-Primary [phone]       No address on file        Equal Access to Services     MORELIA [place] : Hadii farzad mcleano Sominnaali, waaxda luqadaha, qaybta kaalmada adeegyada, lobito hernández . So [facility] [phone].    ATENCIÓN: Si habla español, tiene a de dios disposición servicios gratuitos de asistencia lingüística. Llame al [phone].    We comply with applicable federal civil rights laws and [place] laws. We do not discriminate on the basis of race, color, national origin, age, disability, sex, sexual orientation, or gender identity.            Thank you!     Thank you for choosing [facility] [place]  for your care. Our goal is always to provide you with excellent care. Hearing back from our  patients is one way we can continue to improve our services. Please take a few minutes to complete the written survey that you may receive in the mail after your visit with us. Thank you!             Your Updated Medication List - Protect others around you: Learn how to safely use, store and throw away your medicines at www.disposemymeds.org.          This list is accurate as of 11/2/18 11:26 AM.  Always use your most recent med list.                   Brand Name Dispense Instructions for use Diagnosis    cholecalciferol 1000 UNIT tablet    vitamin D3     Take 1,000 Units by mouth        levothyroxine 150 MCG tablet    SYNTHROID/LEVOTHROID    90 tablet    Take 1 tablet (150 mcg) by mouth daily    Postablative hypothyroidism       ondansetron 4 MG ODT tab    ZOFRAN ODT    60 tablet    Take 1-2 tablets (4-8 mg) by mouth every 8 hours as needed for nausea    Early stage of pregnancy       PREPLUS 27-1 MG Tabs      TK 1 T PO DAILY

## 2018-11-02 NOTE — PROGRESS NOTES
"Patient feels about the same.  States that she is still having daily nausea, but vomiting has decreased some.  She states that she had 2 days without vomiting, and the other 5 days she has had vomitign 1-3 x day.  She states that she is able to keep down \"ice water\" and other really cold drinks, but has not had an appetite and struggling to eat food.  She states that she tried bland foods, but that made her dry heave.  She is also continuing to have dizzines(daily, intermittent) and headaches, especially after taking Zofran.  She has lost 7 lbs since last OB visit  She states that she is continuing to go to infusion center for IV fluids 2x week.  She is also taking Zofran ODT 2-3 x day  Educated about diet, exercise and normal weight gain  Counseled on drinking protein shakes, smoothies, Ensure, or other liquid nutrition.  Counseled to add fiber if needed to smoothies/shakes.  Continue to drink at least 64 oz cold water/electrolyte liquids/day  Normal to feel movement between 18-22 weeks    Discussed genetic screening; patient is unsure about genetic screening.  Handouts provided  Counseled to continue 2 x week infusion appointments as well as 1 x week CNM appointments  Plan for anatomy US at [facility]    return to clinic 1 week or sooner for any worsening of signs and symptoms, vaginal bleeding, or any other concerns.     Faby NELSON CNM      "

## 2018-11-02 NOTE — MR AVS SNAPSHOT
After Visit Summary   11/2/2018    Rocio Elkins    MRN: [id number]           Patient Information     Date Of Birth          1985        Visit Information        Provider Department      11/2/2018 11:30 AM  INFUSION CHAIR 10 [facility] [place]        Today's Diagnoses     Nausea/vomiting in pregnancy    -  1    Supervision of high risk pregnancy in first trimester           Follow-ups after your visit        Your next 10 appointments already scheduled     Dec 27, 2018  1:30 PM CST   Genetic Counseling with  GEN COUNSELOR 1   [facility] Maternal Fetal Medicine [facility])    [address] [place] [id number] [phone]            Dec 27, 2018  2:15 PM CST   MFM US COMP with [id number]   [facility] Maternal Fetal Medicine Ultrasound - [facility])    [address] [place] [id number] [phone]           Wear comfortable clothes and leave your valuables at home.            Dec 27, 2018  2:45 PM CST   Radiology MD with  BENNIE MD   [facility] Maternal Fetal [facility])    [address] [place] [id number] [phone]           Please arrive at the time given for your first appointment. This visit is used internally to schedule the physician's time during your ultrasound.              Who to contact     If you have questions or need follow up information about today's clinic visit or your schedule please contact [facility] directly at [phone].  Normal or non-critical lab and imaging results will be communicated to you by MyChart, letter or phone within 4 business days after the clinic has received the results. If you do not hear from us within 7 days, please contact the clinic through MyChart or phone. If you have a critical or abnormal lab result, we will notify you by  phone as soon as possible.  Submit refill requests through [employer] or call your pharmacy and they will forward the refill request to us. Please allow 3 business days for your refill to be completed.          Additional Information About Your Visit        Care EveryWhere ID     This is your Care EveryWhere ID. This could be used by other organizations to access your [place] medical records  [id number]        Your Vitals Were     Pulse Temperature Respirations Last Period          85 98.1  F (36.7  C) (Oral) 16 08/16/2018 (Exact Date)         Blood Pressure from Last 3 Encounters:   11/02/18 104/66   11/02/18 94/60   10/31/18 108/68    Weight from Last 3 Encounters:   11/02/18 61.7 kg (136 lb)   10/26/18 64.9 kg (143 lb)   10/18/18 64.4 kg (142 lb)              Today, you had the following     No orders found for display       Primary Care Provider Fax #    Physician No Ref-Primary [phone]       No address on file        Equal Access to Services     [facility] : Hadii aad ku hadasho Sogibson, waaxda luqadaha, qaybta kaalmada adeegyada, waxay el hernández . So [facility] [phone].    ATENCIÓN: Si habla español, tiene a de dios disposición servicios gratuitos de asistencia lingüística. Llame al [phone].    We comply with applicable federal civil rights laws and [place] laws. We do not discriminate on the basis of race, color, national origin, age, disability, sex, sexual orientation, or gender identity.            Thank you!     Thank you for choosing [facility]  for your care. Our goal is always to provide you with excellent care. Hearing back from our patients is one way we can continue to improve our services. Please take a few minutes to complete the written survey that you may receive in the mail after your visit with us. Thank you!             Your Updated Medication List - Protect others around you: Learn how to safely use, store and throw away your  medicines at www.disposemymeds.org.          This list is accurate as of 11/2/18  1:26 PM.  Always use your most recent med list.                   Brand Name Dispense Instructions for use Diagnosis    cholecalciferol 1000 UNIT tablet    vitamin D3     Take 1,000 Units by mouth        levothyroxine 150 MCG tablet    SYNTHROID/LEVOTHROID    90 tablet    Take 1 tablet (150 mcg) by mouth daily    Postablative hypothyroidism       ondansetron 4 MG ODT tab    ZOFRAN ODT    60 tablet    Take 1-2 tablets (4-8 mg) by mouth every 8 hours as needed for nausea    Early stage of pregnancy       PREPLUS 27-1 MG Tabs      TK 1 T PO DAILY

## 2018-11-02 NOTE — ED PROVIDER NOTES
History     Chief Complaint:  Constipation, vomiting    HPI   Rocio Elkins is a 32 year old female with a history of thyroid disease who presents to the emergency department with her  for evaluation of constipation and vomiting. Of note, the patient is 11 weeks with her first pregnancy and has been having severe morning sickness with nausea and vomiting. She has been getting IV Zofran fusions and had one earlier today, however they cause constipation. At 1530 today the patient [facility] for constipation recommendations. The nurse recommended that she take a glycerin suppository that she took with no improvement. She has also been taking Colace. She is supposed to be taking B6 but due to the vomiting she has not been able to. She has not tried doxylamine.     Allergies:  Pseudoephedrine      Medications:    Levothyroxine  Zofran     Past Medical History:    Thyroid disease  Thyrotoxic exophthalmos  Hypovitaminosis D  LTBI    Past Surgical History:    History reviewed. No pertinent past surgical history.    Family History:    History reviewed. No pertinent family history.    Social History:  Negative for tobacco use.  Negative for alcohol use.  Marital Status:        Review of Systems   Gastrointestinal: Positive for constipation, nausea and vomiting.   All other systems reviewed and are negative.    Physical Exam   First Vitals:  BP: 103/65  Heart Rate: 96  Temp: 99.4  F (37.4  C)  Resp: 17  SpO2: 100 %      Physical Exam  General: Well-nourished, appears nauseated, spitting into a bag  Eyes: PERRL, conjunctivae pink no scleral icterus or conjunctival injection  ENT:  Moist mucus membranes, posterior oropharynx clear without erythema or exudates  Respiratory:  Lungs clear to auscultation bilaterally, no crackles/rubs/wheezes.  Good air movement  CV: Normal rate and rhythm, no murmurs/rubs/gallops  GI:  Abdomen soft and non-distended.  Normoactive BS.   No tenderness, guarding or rebound  Skin: Warm, dry.  No rashes or petechiae  Musculoskeletal: No peripheral edema or calf tenderness  Neuro: Alert and oriented to person/place/time  Psychiatric: Normal affect    Emergency Department Course   Interventions:  1839 [place] Lady Enema 286 mLs Rectal    Emergency Department Course:  1820 Nursing notes and vitals reviewed. I performed an exam of the patient as documented above.     Medicine administered as documented above.     2010 I rechecked the patient and discussed the results of her workup thus far.     Findings and plan explained to the Patient. Patient discharged home with instructions regarding supportive care, medications, and reasons to return. The importance of close follow-up was reviewed. The patient was prescribed Doxylamine-Pyridoxine.    Impression & Plan    Medical Decision Making:  Rocio Elkins is a 32 year old female with severe nausea and vomiting of pregnancy on zofran who presents for evaluation for decreased stool output without signs of serious intraabdominal problems. Signs and symptoms are consistent with constipation. There are no signs at this point for a need for rectal disimpaction.  There are no signs of obstruction nor other intraabdominal catastrophe.   She had good output with an enema.      She is also frustrated by her severe morning sickness symptoms.  It appears that she has been getting infusions on a fairly regular basis of IV fluids.  She received one today and so I do not feel she needs lab laboratory studies or further IV fluids here.  She states that she was recommended B6 but does not believe she is ever taken doxylamine.  I discussed strategies for dealing with nausea and vomiting of pregnancy.  I recommended that she try B6 and doxylamine as first line treatment in accordance with ACOG guidelines.  I also gave her a prescription for directly just as it is extended release and has enteric coating that can make it easier to  tolerate in the face of severe nausea.  I printed out the patient coupon for her and gave her a prescription for this as well.  I recommended avoiding Zofran as much as possible since it can lead to constipation.  I asked her to follow-up with her own OB/GYN regarding her constipation as soon as possible.    Diagnosis:    ICD-10-CM    1. Constipation, unspecified constipation type K59.00    2. Nausea and vomiting, intractability of vomiting not specified, unspecified vomiting type R11.2        Disposition:  discharged to home    Discharge Medications:  New Prescriptions    DOXYLAMINE-PYRIDOXINE 10-10 MG TBEC    Take 2 tablets by mouth At Bedtime Take two tablets daily at bedtime. If symptoms are not adequately controlled,  the dose can be increased to a maximum recommended dose of four tablets  daily (one in the morning, one mid-afternoon and two at bedtime).     Scribe Disclosure:  I, Rajan Hernandez, am serving as a scribe on 11/2/2018 at 6:20 PM to personally document services performed by Dr. Andrés MD based on my observations and the provider's statements to me.       Rajan Hernandez  11/2/2018    EMERGENCY DEPARTMENT       Brielle Bowen MD  11/02/18 2712       Brielle Bowen MD  11/02/18 5630

## 2018-11-03 NOTE — DISCHARGE INSTRUCTIONS
"*You may resume diet and activities as tolerated.  Get plenty of rest.  Eat small meals frequently and drink plenty of fluids.  *Take medications as prescribed.  Recommend B6 and doxylamine (12.5 mg by mouth at night - available over the counter as \"unisom\").  Zofran for additional relief of nausea. Continue your current medications  *Follow-up with OB/Gyn in the next 1 week.  *Return if you are unable to keep fluids down, develop abdominal pain or vaginal bleeding, faint or feel like you will faint or become worse in any way.    Discharge Instructions  Hyperemesis Gravidarum    You were seen today for hyperemesis gravidarum. It is very common to have some nausea (sick to your stomach) and vomiting (throwing up) in early pregnancy (sometimes called  morning sickness,  although it happens at all times of day.) In hyperemesis gravidarum, however, the vomiting is much more severe, so you may become dehydrated and lose weight. We have treated you today to manage your symptoms and rehydrate you. Often these symptoms persist during the first trimester of pregnancy before improving so you will likely need ongoing care.    Generally, every Emergency Department visit should have a follow-up clinic visit with either a primary or a specialty clinic/provider. Please follow-up as instructed by your emergency provider today.    Return to the Emergency Department if:    You feel dizzy or light headed when standing up.    You are vomiting and cannot keep fluids or medications down.    You urinate (pee) much less than normal.    You feel much more ill or develop new symptoms.    What can I do to help myself?    Be sure to drink plenty of cold clear fluids and suck on popsicles between meals. Pedialyte is a good rehydration liquid but many people don t like the taste so sport drinks are a good alternative (Gatorade). Soda is often excessively sweat but Ginger Ale is sometimes recommended for this condition.    Eat as soon as you feel " hungry, or even before you feel hungry. Try to keep something on your stomach.     Avoid strong smells, or other things that make you feel nauseated.    Snack often and eat small meals high in protein or carbohydrates such as crackers, bread, nuts, and low-fat yogurt. Avoid spicy, greasy, or acid foods.    Avoid lying down right after you eat.    Take your prenatal vitamins at bedtime with a snack instead of taking them in the morning.    Joanie may make you feel better. Try eating a small piece of joanie, or having joanie-flavored hard candies.    Acupressure wrist bands are also helpful to some people.    Treatment:    Nausea medication. Your provider may send you home with a prescription medicine, like Compazine  (prochlorperazine) or Reglan  (metoclopramide). Zofran  (ondansetron) is sometimes used as well.    Your provider may recommend that you take non-prescription nausea medicines, like Dramamine  (dimenhydrinate), Unisom  (doxylamine), or Vitamin B6 (pyridoxine).    Vitamins. Make sure your prenatal vitamins have 400 micrograms of Folic acid and have vitamin B6, since this may help your nausea and vomiting.   If you were given a prescription for medicine here today, be sure to read all of the information (including the package insert) that comes with your prescription.  This will include important information about the medicine, its side effects, and any warnings that you need to know about.  The pharmacist who fills the prescription can provide more information and answer questions you may have about the medicine.  If you have questions or concerns that the pharmacist cannot address, please call or return to the Emergency Department.    Remember that you can always come back to the Emergency Department if you are not able to see your regular provider in the amount of time listed above, if you get any new symptoms, or if there is anything that worries you.    Discharge  Instructions  Constipation  Constipation can cause severe cramping pain and your provider thinks this might be the cause of your abdominal pain (belly pain) today.  People usually recognize that they are constipated because they have difficulty having bowel movements, are not having bowel movements frequently enough, or are not having large enough bowel movements. Sometimes, especially in children or older people, you do not recognize that you are constipated until it becomes severe. The most common causes of constipation are a lack of exercise and not eating enough fruits, vegetables, and whole grains. Constipation can also be a side effect of medications, such as narcotics, or may be caused by a disease of the digestive system.    Generally, every Emergency Department visit should have a follow-up clinic visit with either a primary or a specialty clinic/provider. Please follow-up as instructed by your emergency provider today. Sometimes, chronic constipation requires further testing to determine the cause. If you are over 50 years old, you may need a colonoscopy if you have not had one before.     Return to the Emergency Department if:    Your abdominal pain worsens or does not improve after a bowel movement.    You become very weak.    You get a temperature above 102oF or as directed by your provider.    You have blood in your stools (bright red or black, tarry stools).    You keep vomiting (throwing up) or cannot drink liquids.    Your see blood when you vomit.    Your stomach gets bloated or bigger.    You have new symptoms or anything that worries you.    What can I do to help myself?    If your provider gave you a cathartic medication, like magnesium citrate or GoLytely  (polyethylene glycol), you can expect to have cramps and gas pains after taking it. You can expect to have a number of bowel movements and even diarrhea (loose or watery stools) in the course of clearing your bowels.  You will know your  bowels have been cleaned out after you pass clear liquid. The cramps and gas should let up after you have emptied your bowels. You may want to wait until morning to take this type of medication so you aren t up in the night.     Sometimes instead of cathartics, we recommend laxatives like milk of magnesia to move your bowels more slowly, or an enema to help the bowels to move. Read and follow the package directions, or follow your provider s instructions.    Once you have become very constipated, it takes time for your bowels to return to normal and you need to be very careful to prevent becoming constipated again. Take a laxative if you do not move your bowels at least every two days.       Eat foods that have a lot of fiber. Good choices are fruits, vegetables, prune juice, apple juice, and high fiber cereal. Limit dairy products such as milk and cheese, since these can make constipation worse.     Drink plenty of water.     When you feel the need to go to the bathroom, go to the bathroom. Do not hold it.    Miralax , Metamucil , Colace , Senna or fiber supplements can be used daily.  Miralax  daily is often the best choice for children.  If you were given a prescription for medicine here today, be sure to read all of the information (including the package insert) that comes with your prescription.  This will include important information about the medicine, its side effects, and any warnings that you need to know about.  The pharmacist who fills the prescription can provide more information and answer questions you may have about the medicine.  If you have questions or concerns that the pharmacist cannot address, please call or return to the Emergency Department.   Remember that you can always come back to the Emergency Department if you are not able to see your regular provider in the amount of time listed above, if you get any new symptoms, or if there is anything that worries you.

## 2018-11-06 ENCOUNTER — INFUSION THERAPY VISIT (OUTPATIENT)
Dept: INFUSION THERAPY | Facility: CLINIC | Age: 33
End: 2018-11-06
Attending: ADVANCED PRACTICE MIDWIFE
Payer: COMMERCIAL

## 2018-11-06 VITALS
DIASTOLIC BLOOD PRESSURE: 57 MMHG | SYSTOLIC BLOOD PRESSURE: 95 MMHG | RESPIRATION RATE: 16 BRPM | TEMPERATURE: 97.9 F | OXYGEN SATURATION: 99 % | HEART RATE: 92 BPM

## 2018-11-06 DIAGNOSIS — O09.91 SUPERVISION OF HIGH RISK PREGNANCY IN FIRST TRIMESTER: ICD-10-CM

## 2018-11-06 DIAGNOSIS — O21.9 NAUSEA/VOMITING IN PREGNANCY: Primary | ICD-10-CM

## 2018-11-06 PROCEDURE — 96360 HYDRATION IV INFUSION INIT: CPT

## 2018-11-06 PROCEDURE — 25000128 H RX IP 250 OP 636: Performed by: ADVANCED PRACTICE MIDWIFE

## 2018-11-06 RX ADMIN — SODIUM CHLORIDE, POTASSIUM CHLORIDE, SODIUM LACTATE AND CALCIUM CHLORIDE 1000 ML: 600; 310; 30; 20 INJECTION, SOLUTION INTRAVENOUS at 10:41

## 2018-11-06 ASSESSMENT — PAIN SCALES - GENERAL: PAINLEVEL: NO PAIN (0)

## 2018-11-06 NOTE — PROGRESS NOTES
Infusion Nursing Note:  Rocio MAU Severo presents today for IVF.    Patient seen by provider today: No   present during visit today: Not Applicable.    Note: continues to have nausea and vomiting.    Intravenous Access:  Peripheral IV placed.    Treatment Conditions:  Not Applicable.      Post Infusion Assessment:  Patient tolerated infusion without incident.  Site patent and intact, free from redness, edema or discomfort.  No evidence of extravasations.  Access discontinued per protocol.    Discharge Plan:   Discharge instructions reviewed with: Patient.  Patient and/or family verbalized understanding of discharge instructions and all questions answered.  Copy of AVS reviewed with patient and/or family.  Patient will return 11/9 for next appointment.  Patient discharged in stable condition accompanied by: self.  Departure Mode: Ambulatory.    Renata Mishra RN

## 2018-11-06 NOTE — MR AVS SNAPSHOT
After Visit Summary   11/6/2018    Rocio Elkins    MRN: [id number]           Patient Information     Date Of Birth          1985        Visit Information        Provider Department      11/6/2018 10:00 AM  INFUSION CHAIR 7 [facility]        Today's Diagnoses     Nausea/vomiting in pregnancy    -  1    Supervision of high risk pregnancy in first trimester           Follow-ups after your visit        Your next 10 appointments already scheduled     Nov 09, 2018 12:30 PM CST   Level 2 with  INFUSION CHAIR 12   [facility] ([facility])    [facility]  [address] [facility] [address]  [facility] [address]   [phone]            Dec 27, 2018  1:30 PM CST   Genetic Counseling with  GEN COUNSELOR 1   [facility] Maternal Fetal Medicine [facility])    [address] [place] [address] [address]  [facility] [id number] [phone]            Dec 27, 2018  2:15 PM CST   MFM US COMP with [id number]   [facility] Maternal Fetal Medicine Ultrasound - [facility])    [address] [place] [address] [address]  [facility] [id number] [phone]           Wear comfortable clothes and leave your valuables at home.            Dec 27, 2018  2:45 PM CST   Radiology MD with  BENNIE MOLINA   [facility] Maternal Fetal Medicine - [facility])    [address] [place] [address]  [facility] [id number] [phone]           Please arrive at the time given for your first appointment. This visit is used internally to schedule the physician's time during your ultrasound.              Who to contact     If you have questions or need follow up information about today's clinic visit or your schedule please contact [facility] directly at [phone].  Normal or non-critical lab and imaging results will be communicated to you by Jenn  letter or phone within 4 business days after the clinic has received the results. If you do not hear from us within 7 days, please contact the clinic through Spotistichart or phone. If you have a critical or abnormal lab result, we will notify you by phone as soon as possible.  Submit refill requests through SupportSpace or call your pharmacy and they will forward the refill request to us. Please allow 3 business days for your refill to be completed.          Additional Information About Your Visit        Care EveryWhere ID     This is your Care EveryWhere ID. This could be used by other organizations to access your [place] medical records  [id number]        Your Vitals Were     Pulse Temperature Respirations Last Period Pulse Oximetry       92 97.9  F (36.6  C) (Oral) 16 08/16/2018 (Exact Date) 99%        Blood Pressure from Last 3 Encounters:   11/06/18 95/57   11/02/18 101/64   11/02/18 104/66    Weight from Last 3 Encounters:   11/02/18 61.7 kg (136 lb)   10/26/18 64.9 kg (143 lb)   10/18/18 64.4 kg (142 lb)              Today, you had the following     No orders found for display       Primary Care Provider Fax #    Physician No Ref-Primary [phone]       No address on file        Equal Access to Services     MORELIA [place] : Barrett Levine, waaxda nelida, qaybta kaalmada adealyson, lobito hernández . So [facility] [phone].    ATENCIÓN: Si habla español, tiene a de dios disposición servicios gratuitos de asistencia lingüística. Llame al [phone].    We comply with applicable federal civil rights laws and [place] laws. We do not discriminate on the basis of race, color, national origin, age, disability, sex, sexual orientation, or gender identity.            Thank you!     Thank you for choosing [facility]  for your care. Our goal is always to provide you with excellent care. Hearing back from our patients is one way we can continue to improve  our services. Please take a few minutes to complete the written survey that you may receive in the mail after your visit with us. Thank you!             Your Updated Medication List - Protect others around you: Learn how to safely use, store and throw away your medicines at www.disposemymeds.org.          This list is accurate as of 11/6/18 12:02 PM.  Always use your most recent med list.                   Brand Name Dispense Instructions for use Diagnosis    cholecalciferol 1000 UNIT tablet    vitamin D3     Take 1,000 Units by mouth        Doxylamine-Pyridoxine 10-10 MG Tbec     120 tablet    Take 2 tablets by mouth At Bedtime Take two tablets daily at bedtime. If symptoms are not adequately controlled, the dose can be increased to a maximum recommended dose of four tablets daily (one in the morning, one mid-afternoon and two at bedtime).        levothyroxine 150 MCG tablet    SYNTHROID/LEVOTHROID    90 tablet    Take 1 tablet (150 mcg) by mouth daily    Postablative hypothyroidism       ondansetron 4 MG ODT tab    ZOFRAN ODT    60 tablet    Take 1-2 tablets (4-8 mg) by mouth every 8 hours as needed for nausea    Early stage of pregnancy       PREPLUS 27-1 MG Tabs      TK 1 T PO DAILY

## 2018-11-09 ENCOUNTER — INFUSION THERAPY VISIT (OUTPATIENT)
Dept: INFUSION THERAPY | Facility: CLINIC | Age: 33
End: 2018-11-09
Attending: ADVANCED PRACTICE MIDWIFE
Payer: COMMERCIAL

## 2018-11-09 ENCOUNTER — PRENATAL OFFICE VISIT (OUTPATIENT)
Dept: MIDWIFE SERVICES | Facility: CLINIC | Age: 33
End: 2018-11-09
Payer: COMMERCIAL

## 2018-11-09 VITALS
HEART RATE: 84 BPM | RESPIRATION RATE: 20 BRPM | SYSTOLIC BLOOD PRESSURE: 101 MMHG | DIASTOLIC BLOOD PRESSURE: 68 MMHG | TEMPERATURE: 97.7 F | OXYGEN SATURATION: 100 %

## 2018-11-09 VITALS — DIASTOLIC BLOOD PRESSURE: 60 MMHG | BODY MASS INDEX: 21.65 KG/M2 | SYSTOLIC BLOOD PRESSURE: 102 MMHG | WEIGHT: 138.2 LBS

## 2018-11-09 DIAGNOSIS — Z3A.12 12 WEEKS GESTATION OF PREGNANCY: ICD-10-CM

## 2018-11-09 DIAGNOSIS — O21.9 NAUSEA/VOMITING IN PREGNANCY: Primary | ICD-10-CM

## 2018-11-09 DIAGNOSIS — O09.91 SUPERVISION OF HIGH RISK PREGNANCY IN FIRST TRIMESTER: Primary | ICD-10-CM

## 2018-11-09 DIAGNOSIS — O09.91 SUPERVISION OF HIGH RISK PREGNANCY IN FIRST TRIMESTER: ICD-10-CM

## 2018-11-09 DIAGNOSIS — O21.0 HYPEREMESIS GRAVIDARUM, ANTEPARTUM: ICD-10-CM

## 2018-11-09 PROCEDURE — 25000128 H RX IP 250 OP 636: Performed by: ADVANCED PRACTICE MIDWIFE

## 2018-11-09 PROCEDURE — 96360 HYDRATION IV INFUSION INIT: CPT

## 2018-11-09 PROCEDURE — 99207 ZZC PRENATAL VISIT: CPT | Performed by: ADVANCED PRACTICE MIDWIFE

## 2018-11-09 RX ADMIN — SODIUM CHLORIDE, POTASSIUM CHLORIDE, SODIUM LACTATE AND CALCIUM CHLORIDE 1000 ML: 600; 310; 30; 20 INJECTION, SOLUTION INTRAVENOUS at 14:19

## 2018-11-09 ASSESSMENT — PAIN SCALES - GENERAL: PAINLEVEL: NO PAIN (0)

## 2018-11-09 NOTE — MR AVS SNAPSHOT
After Visit Summary   11/9/2018    Rocio Elkins    MRN: [id number]           Patient Information     Date Of Birth          1985        Visit Information        Provider Department      11/9/2018 2:00 PM  INFUSION CHAIR 3 [facility]        Today's Diagnoses     Nausea/vomiting in pregnancy    -  1    Supervision of high risk pregnancy in first trimester           Follow-ups after your visit        Your next 10 appointments already scheduled     Nov 09, 2018  3:40 PM CST   ESTABLISHED PRENATAL with OMAR Li CNM   [facility] [place] ([facility])    [address] [place] [address] [address]  [facility] [address]   [phone]            Dec 27, 2018  1:30 PM CST   Genetic Counseling with  GEN COUNSELOR 1   [facility] Maternal Fetal Medicine [facility])    [address] [place] [address] [address]  [facility] [address] [phone]            Dec 27, 2018  2:15 PM CST   MFM US COMP with [id number]   [facility] Maternal Fetal Medicine Ultrasound - [facility])    [address] [place] [address] [address]  [facility] [id number] [phone]           Wear comfortable clothes and leave your valuables at home.            Dec 27, 2018  2:45 PM CST   Radiology MD with  BENNIE MOLINA   [facility] Maternal Fetal Medicine [facility])    [address] [place] [address] [address]  [facility] [id number] [phone]           Please arrive at the time given for your first appointment. This visit is used internally to schedule the physician's time during your ultrasound.              Who to contact     If you have questions or need follow up information about today's clinic visit or your schedule please contact [facility] directly at [phone].  Normal or non-critical lab and imaging results will be communicated to you by Jenn, letter  or phone within 4 business days after the clinic has received the results. If you do not hear from us within 7 days, please contact the clinic through Ivaluahart or phone. If you have a critical or abnormal lab result, we will notify you by phone as soon as possible.  Submit refill requests through Sproutkin or call your pharmacy and they will forward the refill request to us. Please allow 3 business days for your refill to be completed.          Additional Information About Your Visit        Care EveryWhere ID     This is your Care EveryWhere ID. This could be used by other organizations to access your [place] medical records  [id number]        Your Vitals Were     Pulse Temperature Respirations Last Period Pulse Oximetry       84 97.7  F (36.5  C) (Oral) 20 08/16/2018 (Exact Date) 100%        Blood Pressure from Last 3 Encounters:   11/09/18 101/68   11/06/18 95/57   11/02/18 101/64    Weight from Last 3 Encounters:   11/02/18 61.7 kg (136 lb)   10/26/18 64.9 kg (143 lb)   10/18/18 64.4 kg (142 lb)              Today, you had the following     No orders found for display       Primary Care Provider Fax #    Physician No Ref-Primary [phone]       No address on file        Equal Access to Services     MORELIA [place] : Barrett Levine, waricoda nelida, qaybta kaalmada humberto, lobito hernández . So [facility] [phone].    ATENCIÓN: Si habla español, tiene a de dios disposición servicios gratuitos de asistencia lingüística. Llame al [phone].    We comply with applicable federal civil rights laws and [place] laws. We do not discriminate on the basis of race, color, national origin, age, disability, sex, sexual orientation, or gender identity.            Thank you!     Thank you for choosing [facility]  for your care. Our goal is always to provide you with excellent care. Hearing back from our patients is one way we can continue to improve our  services. Please take a few minutes to complete the written survey that you may receive in the mail after your visit with us. Thank you!             Your Updated Medication List - Protect others around you: Learn how to safely use, store and throw away your medicines at www.disposemymeds.org.          This list is accurate as of 11/9/18  3:29 PM.  Always use your most recent med list.                   Brand Name Dispense Instructions for use Diagnosis    cholecalciferol 1000 UNIT tablet    vitamin D3     Take 1,000 Units by mouth        Doxylamine-Pyridoxine 10-10 MG Tbec     120 tablet    Take 2 tablets by mouth At Bedtime Take two tablets daily at bedtime. If symptoms are not adequately controlled, the dose can be increased to a maximum recommended dose of four tablets daily (one in the morning, one mid-afternoon and two at bedtime).        levothyroxine 150 MCG tablet    SYNTHROID/LEVOTHROID    90 tablet    Take 1 tablet (150 mcg) by mouth daily    Postablative hypothyroidism       ondansetron 4 MG ODT tab    ZOFRAN ODT    60 tablet    Take 1-2 tablets (4-8 mg) by mouth every 8 hours as needed for nausea    Early stage of pregnancy       PREPLUS 27-1 MG Tabs      TK 1 T PO DAILY

## 2018-11-09 NOTE — MR AVS SNAPSHOT
After Visit Summary   11/9/2018    Rocio Elkins    MRN: [id number]           Patient Information     Date Of Birth          1985        Visit Information        Provider Department      11/9/2018 3:40 PM Vee Singh APRN CNM [facility] [place]        Today's Diagnoses     Supervision of high risk pregnancy in first trimester    -  1    Hyperemesis gravidarum, antepartum        12 weeks gestation of pregnancy           Follow-ups after your visit        Follow-up notes from your care team     Return in about 1 week (around 11/16/2018).      Your next 10 appointments already scheduled     Nov 16, 2018  1:00 PM CST   ESTABLISHED PRENATAL with OMAR De Jesus CNM   [facility] [place] ([facility] [place])    [address]  [place] [address]   [phone]            Nov 16, 2018  2:30 PM CST   Level 2 with SH INFUSION CHAIR 5   [facility] ([facility])    [facility]  [address]  [place] [address]   [phone]            Nov 20, 2018 10:30 AM CST   Level 2 with SH INFUSION CHAIR 4   [facility] ([facility])    [facility]  [address]  [place] [address]   [phone]            Nov 23, 2018  2:30 PM CST   Level 2 with SH INFUSION CHAIR 6   [facility] ([facility])    [facility]  [address]  [place] [address]   [phone]            Dec 27, 2018  1:30 PM CST   Genetic Counseling with  GEN COUNSELOR 1   ealth Maternal Fetal Medicine - [facility] ([facility])    [address]  [address]  [place] [address]   [phone]            Dec 27, 2018  2:15 PM CST   MFM US COMP with CARMITAFMUSR1   [facility]  - [facility] ([facility])    [address]  [place] [address] [phone]           Wear comfortable clothes and leave your valuables at home.            Dec 27, 2018  2:45 PM CST   Radiology MD with SH BENNIE MOLINA   [facility] - [facility] ([facility])    [address]  [place] [address] [phone]           Please arrive at the time given for your first appointment. This visit is used internally to schedule the physician's time during your ultrasound.              Who to contact     If you have questions or need follow up information about today's clinic visit or your schedule please contact [facility] [place] directly at [phone].  Normal or non-critical lab and imaging results will be communicated to you by MyChart, letter or phone within 4 business days after the clinic has received the results. If you do not hear from us within 7 days, please contact the clinic through MyChart or phone. If you have a critical or abnormal lab result, we will notify you by phone as soon as possible.  Submit refill requests through [employer] or call your pharmacy and they will forward the refill request to us. Please allow 3 business days for your refill to be completed.          Additional Information About Your Visit        Care EveryWhere ID     This is your Care EveryWhere ID. This could be used by other organizations to access your [place] medical records  [id number]        Your Vitals Were     Last Period BMI (Body Mass Index)                08/16/2018 (Exact Date) 21.65 kg/m2           Blood Pressure from Last 3 Encounters:   11/09/18 102/60   11/09/18 101/68   11/06/18 95/57    Weight from Last 3 Encounters:   11/09/18 138 lb 3.2 oz (62.7 kg)   11/02/18 136 lb (61.7 kg)   10/26/18 143 lb (64.9 kg)              Today, you had the following     No orders found for display       Primary Care Provider Fax #     Physician No Ref-Primary [phone]       No address on file        Equal Access to Services     MORELIA RUBÉN : Hadcarlos farzad de leon carlos Levine, waricoda beckaadrienneha, eleonorata tiffanynazanin sarithacolinjames, lobito gallegos chelsylauren salazarpatriciarosa walter. So [facility] [phone].    ATENCIÓN: Si habla español, tiene a de dios disposición servicios gratuitos de asistencia lingüística. Llame al [phone].    We comply with applicable federal civil rights laws and [place] laws. We do not discriminate on the basis of race, color, national origin, age, disability, sex, sexual orientation, or gender identity.            Thank you!     Thank you for choosing [facility]  for your care. Our goal is always to provide you with excellent care. Hearing back from our patients is one way we can continue to improve our services. Please take a few minutes to complete the written survey that you may receive in the mail after your visit with us. Thank you!             Your Updated Medication List - Protect others around you: Learn how to safely use, store and throw away your medicines at www.disposemymeds.org.          This list is accurate as of 11/9/18  4:09 PM.  Always use your most recent med list.                   Brand Name Dispense Instructions for use Diagnosis    cholecalciferol 1000 UNIT tablet    vitamin D3     Take 1,000 Units by mouth        Doxylamine-Pyridoxine 10-10 MG Tbec     120 tablet    Take 2 tablets by mouth At Bedtime Take two tablets daily at bedtime. If symptoms are not adequately controlled, the dose can be increased to a maximum recommended dose of four tablets daily (one in the morning, one mid-afternoon and two at bedtime).        levothyroxine 150 MCG tablet    SYNTHROID/LEVOTHROID    90 tablet    Take 1 tablet (150 mcg) by mouth daily    Postablative hypothyroidism       ondansetron 4 MG ODT tab    ZOFRAN ODT    60 tablet    Take 1-2 tablets (4-8 mg) by mouth every 8 hours as needed for nausea    Early  stage of pregnancy       PREPLUS 27-1 MG Tabs      TK 1 T PO DAILY

## 2018-11-09 NOTE — PROGRESS NOTES
Infusion Nursing Note:  Rocio MAU Severo presents today for IVF.    Patient seen by provider today: No   present during visit today: Not Applicable.    Note: N/A.    Intravenous Access:  Peripheral IV placed.    Treatment Conditions:  Not Applicable.      Post Infusion Assessment:  Patient tolerated infusion without incident.  Blood return noted pre and post infusion.  Site patent and intact, free from redness, edema or discomfort.  No evidence of extravasations.  Access discontinued per protocol.    Discharge Plan:   Patient declined prescription refills.  Discharge instructions reviewed with: Patient and .  Patient and family verbalized understanding of discharge instructions and all questions answered.  Copy of AVS reviewed with patient and family.  Patient will return as scheduled for next appointment.  Patient discharged in stable condition accompanied by: self.  Departure Mode: Ambulatory.    Eduarda Singh RN

## 2018-11-09 NOTE — PROGRESS NOTES
Patient feels better today, had infusion and was eating a granola bar during visit  2# weight gain  Continue comfort measures, small frequent meals, hydration  Discussed OTC medication of vitamin b6 and unisom, was prescribed in ED but may also get OTC if it is cheaper  Stop zofran is having dizziness and constipation, Keon will try OTC meds and if they are not working she will call us for rx for reglan  Will need paperwork done, not done today, we did not have it sent to us yet, will watch for it  Continue weekly visits and bi-weekly infusions until N&V resovles, discussed usually it starts to resolve 12-16 weeks  Happy to hear FHTs, here with partner today  Would like to have help getting established with state insurance and support, care coordination referral done    OMAR Maya, CNM

## 2018-11-12 ENCOUNTER — PATIENT OUTREACH (OUTPATIENT)
Dept: CARE COORDINATION | Facility: CLINIC | Age: 33
End: 2018-11-12

## 2018-11-12 ASSESSMENT — ACTIVITIES OF DAILY LIVING (ADL): DEPENDENT_IADLS:: INDEPENDENT

## 2018-11-12 NOTE — PROGRESS NOTES
Clinic Care Coordination Contact    Referral Information:  Referral Source: Other, specify (OB provider)    Primary Diagnosis: Pregnancy    KELY outreached and talked with Pt, introduced self/role and reason for the call. Pt open to speaking with SW and appreciative of the call.     Chief Complaint   Patient presents with     Clinic Care Coordination - Initial     [place] insurance questions       Universal Utilization: Pt's utilization is significant for current pregnancy and outpatient infusion therapy visits. Pt has also been seen in the ED x5 within the last year, 4 of which have been in the last 2 months related to pregnancy concerns/ailments.   Difficulty keeping appointments:: No  Compliance Concerns: No  No-Show Concerns: No  No PCP office visit in Past Year: No  Clinical Concerns:  Current Medical Concerns:  Pt notably difficult to hear throughout the call; she sites not feeling well as the reason for her soft speaking voice. KELY encouraged Pt to follow-up if concerns persist or she has other questions/concerns for the clinic care team.   Current Behavioral Concerns: No behavioral health concerns noted at time of contact with Pt.      Health Maintenance Reviewed: Up to date    Medication Management:  Pt does not report/share medication management questions at contact today.      Functional Status:  Dependent ADLs: Independent  Dependent IADLs: Independent  Mobility Status: Independent    Living Situation:  Current living arrangement: I live in a private home with family  Type of residence: Apartment     Psychosocial:  Mental health DX: No  Informal Support system: Family     Financial/Insurance concerns: Yes- KELY reviewed with Pt her ask from most recent appointment in regard to establishing with state insurance. KELY provided Pt with information on how to apply via the [employer] website and advised this as the first step in the application process. KELY clarified with Pt her ask for more assistance in completing the  application and agreed to follow-up and provide information on [facility]. SW explained they have certified insurance navigators that can work with people step-by-step through the application process. SW confirmed with Pt that she would be able to call and request an appointment per the [place] process.      Resources and Interventions:  Current Resources: Community Resources: OP Infusion  Referrals Placed: [facility]     Patient/Caregiver understanding: Pt expressed understanding to plan to outreach/contact [facility] to initiate assistance with [place] application process.     Outreach Frequency: monthly  Future Appointments              In 4 days Jevon Montero APRN CNM [facility] [place], JENNIFER WOM    In 4 days  INFUSION CHAIR 5 [facility] [place] RISA    In 1 week  INFUSION CHAIR 4 [facility] [place] RISA    In 1 week  INFUSION CHAIR 6 [facility] [place] RISA    In 1 month  GEN COUNSELOR 1 ealth Maternal Fetal Medicine - [facility] RISA    In 1 month MFMUSR1 ealth Maternal Fetal Medicine Ultrasound - [facility] RISA    In 1 month  BENNIE MOLINA [facility] Maternal Fetal Medicine - [facility] RISA          Plan: Pt will contact [facility] to request an appointment and assistance in applying for [place] insurance. SW will follow-up with Pt in 3-4 weeks time to check-in. SW provided Pt with writer's direct contact information and encouraged follow-up sooner if needs arise. Pt expressed understanding.     CHICA Greene, [facility]  , Care Coordination   [phone]  [email or web address]

## 2018-11-16 ENCOUNTER — INFUSION THERAPY VISIT (OUTPATIENT)
Dept: INFUSION THERAPY | Facility: CLINIC | Age: 33
End: 2018-11-16
Attending: ADVANCED PRACTICE MIDWIFE
Payer: COMMERCIAL

## 2018-11-16 VITALS
OXYGEN SATURATION: 98 % | SYSTOLIC BLOOD PRESSURE: 103 MMHG | DIASTOLIC BLOOD PRESSURE: 64 MMHG | TEMPERATURE: 97.8 F | HEART RATE: 82 BPM | RESPIRATION RATE: 20 BRPM

## 2018-11-16 DIAGNOSIS — O09.91 SUPERVISION OF HIGH RISK PREGNANCY IN FIRST TRIMESTER: ICD-10-CM

## 2018-11-16 DIAGNOSIS — O21.0 HYPEREMESIS GRAVIDARUM, ANTEPARTUM: Primary | ICD-10-CM

## 2018-11-16 PROCEDURE — 25000128 H RX IP 250 OP 636: Performed by: ADVANCED PRACTICE MIDWIFE

## 2018-11-16 PROCEDURE — 96360 HYDRATION IV INFUSION INIT: CPT

## 2018-11-16 RX ADMIN — SODIUM CHLORIDE, POTASSIUM CHLORIDE, SODIUM LACTATE AND CALCIUM CHLORIDE 1000 ML: 600; 310; 30; 20 INJECTION, SOLUTION INTRAVENOUS at 14:42

## 2018-11-16 ASSESSMENT — PAIN SCALES - GENERAL: PAINLEVEL: NO PAIN (0)

## 2018-11-16 NOTE — MR AVS SNAPSHOT
After Visit Summary   11/16/2018    Rocio Elkins    MRN: [id number]           Patient Information     Date Of Birth          1985        Visit Information        Provider Department      11/16/2018 2:30 PM  INFUSION CHAIR 15 [facility]        Today's Diagnoses     Hyperemesis gravidarum, antepartum    -  1    Supervision of high risk pregnancy in first trimester           Follow-ups after your visit        Your next 10 appointments already scheduled     Nov 20, 2018 10:30 AM CST   Level 2 with  INFUSION CHAIR 4   [facility] ([facility])    [facility]  [address]  [place] [address]   [phone]            Nov 20, 2018  1:20 PM CST   [facility]ABL[facility]AT[facility] [place] [facility]    [address] [place] [address] [address]  [place] [address]   [phone]            Nov 23, 2018  2:00 PM CST   Level 2 with  INFUSION CHAIR 15   [facility] ([facility])    [facility]  [address]  [place] [address]   [phone]            Dec 27, 2018  1:30 PM CST   Genetic Counseling with  GEN COUNSELOR 1   ealth Maternal Fetal Medicine - [facility] ([facility])    [address] [place] [address] [address]  [place] [address]   [phone]            Dec 27, 2018  2:15 PM CST   MFM US COMP with CONCHITAMFMUSR1   ealth Maternal Fetal Medicine Ultrasound - [facility] ([facility])    [address] [place] [address] [address]  [place] [address]   [phone]           Wear comfortable clothes and leave your valuables at home.            Dec 27, 2018  2:45 PM CST   Radiology MD with SH BENNIE MOLINA   [facility] ([facility])    [address] [place] [address]  [place] [address] [phone]           Please arrive at the time given for your first appointment. This visit is used internally to schedule the physician's time during your ultrasound.              Who to contact     If you have questions or need follow up information about today's clinic visit or your schedule please contact [facility] directly at [phone].  Normal or non-critical lab and imaging results will be communicated to you by MyChart, letter or phone within 4 business days after the clinic has received the results. If you do not hear from us within 7 days, please contact the clinic through MyChart or phone. If you have a critical or abnormal lab result, we will notify you by phone as soon as possible.  Submit refill requests through [employer] or call your pharmacy and they will forward the refill request to us. Please allow 3 business days for your refill to be completed.          Additional Information About Your Visit        Care EveryWhere ID     This is your Care EveryWhere ID. This could be used by other organizations to access your [place] medical records  [id number]        Your Vitals Were     Pulse Temperature Respirations Last Period Pulse Oximetry       82 97.8  F (36.6  C) (Oral) 20 08/16/2018 (Exact Date) 98%        Blood Pressure from Last 3 Encounters:   11/16/18 103/64   11/09/18 102/60   11/09/18 101/68    Weight from Last 3 Encounters:   11/09/18 62.7 kg (138 lb 3.2 oz)   11/02/18 61.7 kg (136 lb)   10/26/18 64.9 kg (143 lb)              Today, you had the following     No orders found for display       Primary Care Provider Fax #    Physician No Ref-Primary [phone]       No address on file        Equal Access to Services     GALILEA [facility] Center[facility]Y : Barrett Levine, may krause, lobito morrow . [facility] [phone].    ATENCIÓN: Si habla español, tiene a de dios disposición  servicios gratuitos de asistencia lingüística. [place] khan [phone].    We comply with applicable federal civil rights laws and [place] laws. We do not discriminate on the basis of race, color, national origin, age, disability, sex, sexual orientation, or gender identity.            Thank you!     Thank you for choosing [facility]  for your care. Our goal is always to provide you with excellent care. Hearing back from our patients is one way we can continue to improve our services. Please take a few minutes to complete the written survey that you may receive in the mail after your visit with us. Thank you!             Your Updated Medication List - Protect others around you: Learn how to safely use, store and throw away your medicines at www.disposemymeds.org.          This list is accurate as of 11/16/18  4:00 PM.  Always use your most recent med list.                   Brand Name Dispense Instructions for use Diagnosis    cholecalciferol 1000 UNIT tablet    vitamin D3     Take 1,000 Units by mouth        Doxylamine-Pyridoxine 10-10 MG Tbec     120 tablet    Take 2 tablets by mouth At Bedtime Take two tablets daily at bedtime. If symptoms are not adequately controlled, the dose can be increased to a maximum recommended dose of four tablets daily (one in the morning, one mid-afternoon and two at bedtime).        levothyroxine 150 MCG tablet    SYNTHROID/LEVOTHROID    90 tablet    Take 1 tablet (150 mcg) by mouth daily    Postablative hypothyroidism       ondansetron 4 MG ODT tab    ZOFRAN ODT    60 tablet    Take 1-2 tablets (4-8 mg) by mouth every 8 hours as needed for nausea    Early stage of pregnancy       PREPLUS 27-1 MG Tabs      TK 1 T PO DAILY

## 2018-11-16 NOTE — PROGRESS NOTES
Infusion Nursing Note:  Rocio MAU Quintanillajarvis presents today for IVF.    Patient seen by provider today: No   present during visit today: Not Applicable.    Note: N/A.    Intravenous Access:  Peripheral IV placed.    Treatment Conditions:  Not Applicable.      Post Infusion Assessment:  Patient tolerated infusion without incident.  Blood return noted pre and post infusion.  Site patent and intact, free from redness, edema or discomfort.  No evidence of extravasations.  Access discontinued per protocol.    Discharge Plan:   Patient declined prescription refills.  Discharge instructions reviewed with: Patient and .  Patient and family verbalized understanding of discharge instructions and all questions answered.  Copy of AVS reviewed with patient and/or family.  Patient will return 11/20/18 for next appointment.  Patient discharged in stable condition accompanied by: self and .  Departure Mode: Ambulatory.    Eduarda Singh RN

## 2018-11-19 ENCOUNTER — PATIENT OUTREACH (OUTPATIENT)
Dept: CARE COORDINATION | Facility: CLINIC | Age: 33
End: 2018-11-19

## 2018-11-20 ENCOUNTER — PRENATAL OFFICE VISIT (OUTPATIENT)
Dept: MIDWIFE SERVICES | Facility: CLINIC | Age: 33
End: 2018-11-20
Payer: COMMERCIAL

## 2018-11-20 ENCOUNTER — TELEPHONE (OUTPATIENT)
Dept: OBGYN | Facility: CLINIC | Age: 33
End: 2018-11-20

## 2018-11-20 VITALS — SYSTOLIC BLOOD PRESSURE: 98 MMHG | WEIGHT: 135.8 LBS | BODY MASS INDEX: 21.27 KG/M2 | DIASTOLIC BLOOD PRESSURE: 52 MMHG

## 2018-11-20 DIAGNOSIS — O21.0 HYPEREMESIS GRAVIDARUM, ANTEPARTUM: ICD-10-CM

## 2018-11-20 DIAGNOSIS — Z3A.13 13 WEEKS GESTATION OF PREGNANCY: ICD-10-CM

## 2018-11-20 DIAGNOSIS — O09.91 SUPERVISION OF HIGH RISK PREGNANCY IN FIRST TRIMESTER: Primary | ICD-10-CM

## 2018-11-20 PROCEDURE — 99207 ZZC PRENATAL VISIT: CPT | Performed by: ADVANCED PRACTICE MIDWIFE

## 2018-11-20 RX ORDER — ONDANSETRON 4 MG/1
4-8 TABLET, ORALLY DISINTEGRATING ORAL EVERY 8 HOURS PRN
Qty: 60 TABLET | Refills: 1 | Status: SHIPPED | OUTPATIENT
Start: 2018-11-20 | End: 2019-03-14

## 2018-11-20 NOTE — PATIENT INSTRUCTIONS
Remedies for nausea and vomiting with pregnancy      Eat small frequent meals every 2-3 hours if possible.       Avoid food at extremes of temperature and drinks with carbonation.      Eat foods that appeal to you, avoiding fats and spicy foods.      Avoid liquids with foods.  Drink liquids 30-60 minutes before or after eating and sip slowly.      Bread, pasta, crackers, potatoes, and rice tend to be tolerated the best.      Don't worry about what you eat in the first 3 months, it is more important that you can eat and keep it down.       Try flat ginger ale or ginger tea      Before rising in the morning, eat a small amount of crackers or dry toast.      Peppermint tea      Ginger is a herbal remedy for nausea and you can use it in any form.  There are ginger tablets you can purchase.  The dose 1000 mg a day in divided doses.       You may also try doxylamine (Unisom) 12.5 mg three times a day which is a sleeping medication along with Vitamin B6 25 mg three times a day.  This combination takes up to a week to work so give it some time.       Benadryl (diphenhydramine) 25-50 mg every 8 hour or Dramamine (dimenhydrinate)  mg by mouth every 4-6 hours. Both of these medication may cause some drowsiness      Other things that may help include an Accupressure band and acupuncture      If these methods fail there are many prescriptions that we can try    If you begin to vomit more than 5 or 6 times a day and feel that you are unable to keep anything down, call the [facility] at [phone]

## 2018-11-20 NOTE — LETTER
November 20, 2018      Rocio Elkins  [address]   [place] [address]        To Whom It May Concern,     Rocio Elkins attended clinic here on Nov 20, 2018 and will be returning to work for 4 hours per day, three days per week on Nov 26, 2018.      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    Work restrictions will be reassessed on Nov 30, 2018.    If you have questions or concerns, please call the clinic at the number listed above.    Sincerely,           Rachel Sky, MADELINE, APRN, CNM

## 2018-11-20 NOTE — PROGRESS NOTES
"Rocio is here today with her sister. She states that she is feeling better overall, \"I have good days and I have bad days; more good days than bad days\".  Down 3 pounds from last visit, but last visit was immediately following a fluid infusion. Still down 6 pounds overall. Rocio states that she is able to drink and eat much more than she was previously. She continues to take Zofran; on good days she does not need to take it at all, and on bad days she takes it 2-3 times.  Rocio states she needs a refill; Zofran refill sent to preferred pharmacy. Rocio also requested the name of the medications that Love gave her last time, says she wants to try it but forgot the name. Wrote down doxylamine and B6 with dosing and instructions for use. Recommended that Rocio continued to drink small amounts of fluids at a time and to eat small, frequent meals and snacks. Rocio denies any dizziness or lightheadedness but states that the Zofran gives her mild headaches.  Rocio denies any vaginal bleeding, leaking of fluid, cramping, pelvic pain. She does have mild intermittent groin discomfort consistent with round ligament pain; discussed comfort measures.   Rocio would like to try to return to work part time; she feels she is ready to attempt this. Discussed taking breaks at work as needed and bringing a water bottle and snacks to work. Advised Rocio to continue infusion appointments at least 1-2x per week to stay hydrated. Rocio's next infusion is set up for Friday, 11/23; advised Rocio to keep this appointment. Starting Monday, 11/26, Rocio will return to work 4 hours at a time, three days per week. She will then return to clinic on Friday, 11/30 for a CNM visit; will adjust/increase hours at that time. If Rocio returns to work and feels it is too difficult or too much, she will call the clinic and/or come back in for an appointment.  Paperwork sent to absence management.      Rachel Sky, DNP, APRN, CNM        "

## 2018-11-20 NOTE — TELEPHONE ENCOUNTER
Called patient directly.  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     She is also wondering about taking doxylamine.  Advised Rocio to take 12.5mg of doxylamine when she does not have to work or go anywhere and see how it makes her feel. If it makes her very drowsy, she can take the vitamin B6 three times a day and take the doxylamine only at bedtime.     Letter written for Rocio and faxed to absence management.     Rachel Sky, DNP, APRN, CNM

## 2018-11-20 NOTE — PROGRESS NOTES
Clinic Care Coordination Contact  [facility]/Voicemail    Clinical Data:  Outreach- SW received VM from Pt today with a request for CB as Pt had some additional questions.     Outreach attempted x 1. SW left message on voicemail with call back information and requested return call.  Plan: SW will attempt outreach again in 3-5 business days.     CHICA Greene, [facility]  , Care Coordination   [facility]  [phone]  [email or web address]

## 2018-11-20 NOTE — TELEPHONE ENCOUNTER
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 Feels she is to weak for this work.   Pt also states she can not take doxylamine because it would make her sleepy. Wants to know what she can take.   Routing to [facility] to review.

## 2018-11-20 NOTE — MR AVS SNAPSHOT
After Visit Summary   11/20/2018    Rocio Elkins    MRN: [id number]           Patient Information     Date Of Birth          1985        Visit Information        Provider Department      11/20/2018 10:20 AM Rachel Sky APRN CNM [facility]        Today's Diagnoses     Supervision of high risk pregnancy in first trimester    -  1    13 weeks gestation of pregnancy        Hyperemesis gravidarum, antepartum          Care Instructions    Remedies for nausea and vomiting with pregnancy      Eat small frequent meals every 2-3 hours if possible.       Avoid food at extremes of temperature and drinks with carbonation.      Eat foods that appeal to you, avoiding fats and spicy foods.      Avoid liquids with foods.  Drink liquids 30-60 minutes before or after eating and sip slowly.      Bread, pasta, crackers, potatoes, and rice tend to be tolerated the best.      Don't worry about what you eat in the first 3 months, it is more important that you can eat and keep it down.       Try flat ginger ale or ginger tea      Before rising in the morning, eat a small amount of crackers or dry toast.      Peppermint tea      Ginger is a herbal remedy for nausea and you can use it in any form.  There are ginger tablets you can purchase.  The dose 1000 mg a day in divided doses.       You may also try doxylamine (Unisom) 12.5 mg three times a day which is a sleeping medication along with Vitamin B6 25 mg three times a day.  This combination takes up to a week to work so give it some time.       Benadryl (diphenhydramine) 25-50 mg every 8 hour or Dramamine (dimenhydrinate)  mg by mouth every 4-6 hours. Both of these medication may cause some drowsiness      Other things that may help include an Accupressure band and acupuncture      If these methods fail there are many prescriptions that we can try    If you begin to vomit more than 5 or 6 times a day and feel that you are unable to  keep anything down, call the [facility] at [phone]            Follow-ups after your visit        Follow-up notes from your care team     Return in about 1 week (around 11/27/2018).      Your next 10 appointments already scheduled     Nov 23, 2018  2:00 PM CST   Level 2 with  INFUSION CHAIR 15   [facility] ([facility])    n [facility]  [address]  [place] [address] [phone]            Nov 30, 2018  2:50 PM CST   ESTABLISHED PRENATAL with OMAR Gunter CNM   [facility] [place] ([facility])    [address]  [place] [address] [phone]            Dec 27, 2018  1:30 PM CST   Genetic Counseling with  GEN COUNSELOR 1   ealth Maternal Fetal Medicine [facility] ([facility])    [address]  [place] [address] [phone]            Dec 27, 2018  2:15 PM CST   MFM US COMP with MFMUSR1   ealth Maternal Fetal Medicine Ultrasound - [facility] ([facility])    [address]  [facility] [address] [phone]           Wear comfortable clothes and leave your valuables at home.            Dec 27, 2018  2:45 PM CST   Radiology MD with  BENNIE MOLINA   [facility] ([facility])    [address]  [facility] [address] [phone]           Please arrive at the time given for your first appointment. This visit is used internally to schedule the physician's time during your ultrasound.              Who to contact     If you have questions or need follow up information about today's clinic visit or your schedule please contact [facility] [place] directly at [phone].  Normal or non-critical lab and imaging results will be communicated to you by MyChart, letter or phone within 4  business days after the clinic has received the results. If you do not hear from us within 7 days, please contact the clinic through [employer] or phone. If you have a critical or abnormal lab result, we will notify you by phone as soon as possible.  Submit refill requests through [employer] or call your pharmacy and they will forward the refill request to us. Please allow 3 business days for your refill to be completed.          Additional Information About Your Visit        Care EveryWhere ID     This is your Care EveryWhere ID. This could be used by other organizations to access your [facility] medical records  [id number]        Your Vitals Were     Last Period Breastfeeding? BMI (Body Mass Index)             08/16/2018 (Exact Date) No 21.27 kg/m2          Blood Pressure from Last 3 Encounters:   11/20/18 98/52   11/16/18 103/64   11/09/18 102/60    Weight from Last 3 Encounters:   11/20/18 135 lb 12.8 oz (61.6 kg)   11/09/18 138 lb 3.2 oz (62.7 kg)   11/02/18 136 lb (61.7 kg)              Today, you had the following     No orders found for display         Where to get your medicines      These medications were sent to [facility] [place], [place] - [address]  [address], [facility] [address]     Phone:  [phone]     ondansetron 4 MG ODT tab          Primary Care Provider Fax #    Physician No Ref-Primary [phone]       No address on file        Equal Access to Services     MORELIA MONTANA : Hadii farzad mcleano Sogibson, waaxda luqadaha, qaybta kaalmada humberto, lobito telles ademiguel walter. So [facility] [phone].    ATENCIÓN: Si habla español, tiene a de dios disposición servicios gratuitos de asistencia lingüística. Llame al [phone].    We comply with applicable federal civil rights laws and [place] laws. We do not discriminate on the basis of race, color, national origin, age, disability, sex, sexual orientation, or gender identity.             Thank you!     Thank you for choosing [facility] [place]  for your care. Our goal is always to provide you with excellent care. Hearing back from our patients is one way we can continue to improve our services. Please take a few minutes to complete the written survey that you may receive in the mail after your visit with us. Thank you!             Your Updated Medication List - Protect others around you: Learn how to safely use, store and throw away your medicines at www.disposemymeds.org.          This list is accurate as of 11/20/18  1:26 PM.  Always use your most recent med list.                   Brand Name Dispense Instructions for use Diagnosis    cholecalciferol 1000 UNIT tablet    vitamin D3     Take 1,000 Units by mouth        levothyroxine 150 MCG tablet    SYNTHROID/LEVOTHROID    90 tablet    Take 1 tablet (150 mcg) by mouth daily    Postablative hypothyroidism       ondansetron 4 MG ODT tab    ZOFRAN ODT    60 tablet    Take 1-2 tablets (4-8 mg) by mouth every 8 hours as needed for nausea    Hyperemesis gravidarum, antepartum       PREPLUS 27-1 MG Tabs      TK 1 T PO DAILY

## 2018-11-21 NOTE — TELEPHONE ENCOUNTER
Lauren Mendez at [employer]. Received letter regarding pt's return to work. 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 Informed that p t called yesterday and requested this. States she was too weak to work calls. Catherine is asking for further clarification. Can be reached at [phone]. If new letter needs to be sent, it can be faxed to [phone]. Routing to [facility] to review.

## 2018-11-21 NOTE — TELEPHONE ENCOUNTER
Left message for Lauren Mendez at [facility] to call clinic back to talk to me.  I am on call today.  Please page me if she calls back and needs to speak to me.  Thanks!    Faby NELSON CNM

## 2018-11-23 ENCOUNTER — INFUSION THERAPY VISIT (OUTPATIENT)
Dept: INFUSION THERAPY | Facility: CLINIC | Age: 33
End: 2018-11-23
Attending: ADVANCED PRACTICE MIDWIFE
Payer: COMMERCIAL

## 2018-11-23 VITALS
TEMPERATURE: 98.2 F | HEART RATE: 87 BPM | DIASTOLIC BLOOD PRESSURE: 63 MMHG | RESPIRATION RATE: 16 BRPM | SYSTOLIC BLOOD PRESSURE: 97 MMHG

## 2018-11-23 DIAGNOSIS — O21.0 HYPEREMESIS GRAVIDARUM, ANTEPARTUM: Primary | ICD-10-CM

## 2018-11-23 DIAGNOSIS — O09.91 SUPERVISION OF HIGH RISK PREGNANCY IN FIRST TRIMESTER: ICD-10-CM

## 2018-11-23 PROCEDURE — 96360 HYDRATION IV INFUSION INIT: CPT

## 2018-11-23 PROCEDURE — 25000128 H RX IP 250 OP 636: Performed by: ADVANCED PRACTICE MIDWIFE

## 2018-11-23 RX ADMIN — SODIUM CHLORIDE, POTASSIUM CHLORIDE, SODIUM LACTATE AND CALCIUM CHLORIDE 1000 ML: 600; 310; 30; 20 INJECTION, SOLUTION INTRAVENOUS at 14:33

## 2018-11-23 ASSESSMENT — PAIN SCALES - GENERAL: PAINLEVEL: NO PAIN (0)

## 2018-11-23 NOTE — PROGRESS NOTES
Infusion Nursing Note:  Rocio Elkins presents today for IVF.    Patient seen by provider today: No   present during visit today: Not Applicable.    Note: N/A.    Intravenous Access:  Peripheral IV placed.    Treatment Conditions:  Not Applicable.      Post Infusion Assessment:  Patient tolerated infusion without incident.  Site patent and intact, free from redness, edema or discomfort.  No evidence of extravasations.  Access discontinued per protocol.    Discharge Plan:   Discharge instructions reviewed with: Patient.  Patient and/or family verbalized understanding of discharge instructions and all questions answered.  Copy of AVS reviewed with patient and/or family.  Patient will return next week for next appointment.  Patient discharged in stable condition accompanied by: .  Departure Mode: Ambulatory.    Nelida Martin RN

## 2018-11-23 NOTE — MR AVS SNAPSHOT
After Visit Summary   11/23/2018    Rocio Elkins    MRN: [id number]           Patient Information     Date Of Birth          1985        Visit Information        Provider Department      11/23/2018 2:00 PM  INFUSION CHAIR 15 [facility]        Today's Diagnoses     Hyperemesis gravidarum, antepartum    -  1    Supervision of high risk pregnancy in first trimester           Follow-ups after your visit        Your next 10 appointments already scheduled     Nov 30, 2018  1:30 PM CST   Level 2 with  INFUSION CHAIR 9   [facility] ([facility])    n Medical Ctr [facility]  [address]  [place] [address]   [phone]            Nov 30, 2018  2:50 PM CST   ESTABLISHED PRENATAL with OMAR Gunter CNM   [facility] [place] ([facility] [place])    [address]  [place] [address]   [phone]            Dec 27, 2018  1:30 PM CST   Genetic Counseling with  GEN COUNSELOR 1   [facility] ([facility])    [address] [place] [address] [address]  [place] [address]   [phone]            Dec 27, 2018  2:15 PM CST   MFM US COMP with CONCHITAMFMUSR1   [facility])    [address] [place] [address] [address]  [place] [address] [phone]           Wear comfortable clothes and leave your valuables at home.            Dec 27, 2018  2:45 PM CST   Radiology MD with  BROOKSM MD   ealth Maternal Fetal Medicine - [facility])    [address] [place] [address] [address]  [place] [address] [phone]           Please arrive at the time given for your first appointment. This visit is used internally to schedule the physician's time during your ultrasound.              Who to contact     If you have questions or  need follow up information about today's clinic visit or your schedule please contact [facility] directly at [phone].  Normal or non-critical lab and imaging results will be communicated to you by MyChart, letter or phone within 4 business days after the clinic has received the results. If you do not hear from us within 7 days, please contact the clinic through MyChart or phone. If you have a critical or abnormal lab result, we will notify you by phone as soon as possible.  Submit refill requests through [employer] or call your pharmacy and they will forward the refill request to us. Please allow 3 business days for your refill to be completed.          Additional Information About Your Visit        Care EveryWhere ID     This is your Care EveryWhere ID. This could be used by other organizations to access your [facility] medical records  [id number]        Your Vitals Were     Pulse Temperature Respirations Last Period          87 98.2  F (36.8  C) (Oral) 16 08/16/2018 (Exact Date)         Blood Pressure from Last 3 Encounters:   11/23/18 97/63   11/20/18 98/52   11/16/18 103/64    Weight from Last 3 Encounters:   11/20/18 61.6 kg (135 lb 12.8 oz)   11/09/18 62.7 kg (138 lb 3.2 oz)   11/02/18 61.7 kg (136 lb)              Today, you had the following     No orders found for display       Primary Care Provider Fax #    Physician No Ref-Primary [phone]       No address on file        Equal Access to Services     MORELIA MONTANA : Hadii farzad Levine, waaxda nelida, qaybta kaalmada humberto, lobito walter. So [facility] [phone].    ATENCIÓN: Si habla español, tiene a de dios disposición servicios gratuitos de asistencia lingüística. Llame al [phone].    We comply with applicable federal civil rights laws and [place] laws. We do not discriminate on the basis of race, color, national origin, age, disability, sex, sexual orientation, or gender  identity.            Thank you!     Thank you for choosing [facility]  for your care. Our goal is always to provide you with excellent care. Hearing back from our patients is one way we can continue to improve our services. Please take a few minutes to complete the written survey that you may receive in the mail after your visit with us. Thank you!             Your Updated Medication List - Protect others around you: Learn how to safely use, store and throw away your medicines at www.disposemymeds.org.          This list is accurate as of 11/23/18  3:17 PM.  Always use your most recent med list.                   Brand Name Dispense Instructions for use Diagnosis    cholecalciferol 1000 UNIT tablet    vitamin D3     Take 1,000 Units by mouth        levothyroxine 150 MCG tablet    SYNTHROID/LEVOTHROID    90 tablet    Take 1 tablet (150 mcg) by mouth daily    Postablative hypothyroidism       ondansetron 4 MG ODT tab    ZOFRAN ODT    60 tablet    Take 1-2 tablets (4-8 mg) by mouth every 8 hours as needed for nausea    Hyperemesis gravidarum, antepartum       PREPLUS 27-1 MG Tabs      TK 1 T PO DAILY

## 2018-11-26 ENCOUNTER — TELEPHONE (OUTPATIENT)
Dept: MIDWIFE SERVICES | Facility: CLINIC | Age: 33
End: 2018-11-26

## 2018-11-26 NOTE — LETTER
November 27, 2018      Rocio Elkins  [address] [place] [address]   [facility] [address]        Rocio Elkins attended clinic here on Nov 20, 2018 and will be returning to work for 4 hours per day, three days per week on Nov 26, 2018.      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    Work restrictions will be reassessed on Nov 30, 2018.    If you have questions or concerns, please call the clinic at the number listed above.    Sincerely,             Rachel Sky, MADELINE, APRN, CNM

## 2018-11-26 NOTE — TELEPHONE ENCOUNTER
Left VM message for Lauren Mendez to call my direct line today: [phone].  If she is unable to reach me, she can call the clinic line at [phone].      I will attempt to call Lauren Mendez again today after 3:00pm.    Faby NELSON CNM

## 2018-11-26 NOTE — TELEPHONE ENCOUNTER
Called Lauren Mendez  They received a note from provider re: returning to work with some restrictions. She states restrictions were confusing.  3 days/week  Limited job/exclusions of talking on phone    Letter 11/20/18 from DEVIN Sky CNM:  Returning to work for 4 hours per day, 3 days a week  Returning 11/26/18  Limited to  and not answering phones  Reassessed 11/30/18    Limiting amount of talking she can do? Clarify the amount time she is actually talking, need clear expectations on what she can and cannot do and why. 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    Routing to the midwives to advise. Unable to find copy of restriction letter sent to Human Resources at pt's place of employment.  Call Lauren Mendez at number below to clarify.

## 2018-11-26 NOTE — TELEPHONE ENCOUNTER
Left voicemail message for patient to call clinic to discuss with me her work restriction of not being able to answer phones.     Faby NELSON, KELLENM

## 2018-11-27 NOTE — TELEPHONE ENCOUNTER
Called Rocio to follow-up on work restrictions.  She had previously informed me that her manager had stated he/she would be able to accommodate restrictions of working at the  only and not answering phones.  HR called stating they are unable to accommodate these restrictions.  Discussed this with Rocio and she is willing to answer phones at the . Will resent restrictions letter to HR.    Rachel Sky, DNP, APRN, CNM

## 2018-11-30 ENCOUNTER — INFUSION THERAPY VISIT (OUTPATIENT)
Dept: INFUSION THERAPY | Facility: CLINIC | Age: 33
End: 2018-11-30
Attending: ADVANCED PRACTICE MIDWIFE
Payer: COMMERCIAL

## 2018-11-30 ENCOUNTER — PRENATAL OFFICE VISIT (OUTPATIENT)
Dept: MIDWIFE SERVICES | Facility: CLINIC | Age: 33
End: 2018-11-30
Payer: COMMERCIAL

## 2018-11-30 VITALS — BODY MASS INDEX: 21.46 KG/M2 | WEIGHT: 137 LBS | DIASTOLIC BLOOD PRESSURE: 54 MMHG | SYSTOLIC BLOOD PRESSURE: 96 MMHG

## 2018-11-30 VITALS
RESPIRATION RATE: 16 BRPM | DIASTOLIC BLOOD PRESSURE: 65 MMHG | HEART RATE: 98 BPM | SYSTOLIC BLOOD PRESSURE: 101 MMHG | TEMPERATURE: 98 F

## 2018-11-30 DIAGNOSIS — O09.91 SUPERVISION OF HIGH RISK PREGNANCY IN FIRST TRIMESTER: ICD-10-CM

## 2018-11-30 DIAGNOSIS — O21.0 HYPEREMESIS GRAVIDARUM, ANTEPARTUM: Primary | ICD-10-CM

## 2018-11-30 DIAGNOSIS — O21.0 HYPEREMESIS GRAVIDARUM, ANTEPARTUM: ICD-10-CM

## 2018-11-30 PROCEDURE — 96360 HYDRATION IV INFUSION INIT: CPT

## 2018-11-30 PROCEDURE — 25000128 H RX IP 250 OP 636: Performed by: ADVANCED PRACTICE MIDWIFE

## 2018-11-30 PROCEDURE — 99207 ZZC PRENATAL VISIT: CPT | Performed by: ADVANCED PRACTICE MIDWIFE

## 2018-11-30 RX ADMIN — SODIUM CHLORIDE, POTASSIUM CHLORIDE, SODIUM LACTATE AND CALCIUM CHLORIDE 1000 ML: 600; 310; 30; 20 INJECTION, SOLUTION INTRAVENOUS at 14:13

## 2018-11-30 NOTE — LETTER
November 30, 2018      Rocio Elkins  [address] [place] [address]   [facility] [address]        To Whom It May Concern,     Rocio Elkins attended clinic here on Nov 30, 2018. Due to pregnancy related concerns, I am advising she be off work for the following week, 12/3/2018-12/9/2018. She will be reevaluated during that week to see if returning to work in the coming weeks will be feasible. If you have questions or concerns, please call the clinic at the number listed above.    Sincerely,         OMAR Awan CNM

## 2018-11-30 NOTE — MR AVS SNAPSHOT
After Visit Summary   11/30/2018    Rocio Elkins    MRN: [id number]           Patient Information     Date Of Birth          1985        Visit Information        Provider Department      11/30/2018 2:50 PM Kendra Stack APRN CNM [facility] [place]        Today's Diagnoses     Hyperemesis gravidarum, antepartum        Supervision of high risk pregnancy in first trimester           Follow-ups after your visit        Your next 10 appointments already scheduled     Dec 07, 2018  3:00 PM CST   ESTABLISHED PRENATAL with OMAR Li CNM   [facility] [place] ([facility] [place])    [address] [place] [address] [address]  [place] [address]   [phone]            Dec 27, 2018  1:30 PM CST   Genetic Counseling with  GEN COUNSELOR 1   [facility] Maternal Fetal Medicine [facility] ([facility])    [address] [place] [address] [address]  [place] [address] [phone]            Dec 27, 2018  2:15 PM CST   MFM US COMP with CARMITAFMUSR1   [facility] Maternal Fetal Medicine Ultrasound - [facility] ([facility])    [address] [place] [address] [address]  [place] [address] [phone]           Wear comfortable clothes and leave your valuables at home.            Dec 27, 2018  2:45 PM CST   Radiology MD with  BENNIE MOLINA   [facility] Maternal Fetal Medicine [facility] ([facility])    [address] [place] [address] [address]  [place] [address] [phone]           Please arrive at the time given for your first appointment. This visit is used internally to schedule the physician's time during your ultrasound.              Who to contact     If you have questions or need follow up information about today's clinic visit or your schedule please contact [facility] [place] directly at [phone].  Normal or non-critical lab and imaging results will be communicated to you by MyChart, letter or phone  within 4 business days after the clinic has received the results. If you do not hear from us within 7 days, please contact the clinic through [employer] or phone. If you have a critical or abnormal lab result, we will notify you by phone as soon as possible.  Submit refill requests through [employer] or call your pharmacy and they will forward the refill request to us. Please allow 3 business days for your refill to be completed.          Additional Information About Your Visit        Care EveryWhere ID     This is your Care EveryWhere ID. This could be used by other organizations to access your [facility] medical records  [id number]        Your Vitals Were     Last Period Breastfeeding? BMI (Body Mass Index)             08/16/2018 (Exact Date) No 21.46 kg/m2          Blood Pressure from Last 3 Encounters:   11/30/18 96/54   11/30/18 101/65   11/23/18 97/63    Weight from Last 3 Encounters:   11/30/18 137 lb (62.1 kg)   11/20/18 135 lb 12.8 oz (61.6 kg)   11/09/18 138 lb 3.2 oz (62.7 kg)              Today, you had the following     No orders found for display       Primary Care Provider Fax #    Physician No Ref-Primary [phone]       No address on file        Equal Access to Services     MORELIA MONTANA : Barrett Levine, waricoda nelida, qaybta kaalmada humberto, lobito walter. So [facility] [phone].    ATENCIÓN: Si habla español, tiene a de diso disposición servicios gratuitos de asistencia lingüística. Michelleame al [phone].    We comply with applicable federal civil rights laws and [place] laws. We do not discriminate on the basis of race, color, national origin, age, disability, sex, sexual orientation, or gender identity.            Thank you!     Thank you for choosing [facility] [place]  for your care. Our goal is always to provide you with excellent care. Hearing back from our patients is one way we can continue to improve our services. Please take a few  minutes to complete the written survey that you may receive in the mail after your visit with us. Thank you!             Your Updated Medication List - Protect others around you: Learn how to safely use, store and throw away your medicines at www.disposemymeds.org.          This list is accurate as of 11/30/18  3:22 PM.  Always use your most recent med list.                   Brand Name Dispense Instructions for use Diagnosis    levothyroxine 150 MCG tablet    SYNTHROID/LEVOTHROID    90 tablet    Take 1 tablet (150 mcg) by mouth daily    Postablative hypothyroidism       ondansetron 4 MG ODT tab    ZOFRAN ODT    60 tablet    Take 1-2 tablets (4-8 mg) by mouth every 8 hours as needed for nausea    Hyperemesis gravidarum, antepartum       PREPLUS 27-1 MG Tabs      TK 1 T PO DAILY        vitamin D3 1000 units (25 mcg) tablet    CHOLECALCIFEROL     Take 1,000 Units by mouth

## 2018-11-30 NOTE — PROGRESS NOTES
Still feeling sick. Only worked one day this week. Was going to try and work 3 days a week for 4 hour blocks. Does not think she can work yet this coming week, requesting a letter to be sent to employer, states she needs another week off, maybe. Is scheduled to work on Monday. Will fax a letter to [phone], stating off of work for another week and will reevaluate returning back to work when she is seen again in clinic for her next appt.   Is still doing IV  infusion therapy 1-2 times a week. Last infusion was today.  Still taking Zofran 1-2 times a day.  Had a opened Far[employer] bar with her at this visit.   Fetal movement No  Declined AFP today      Return to clinic 1 weeks    Kendra NELSON CNM

## 2018-11-30 NOTE — MR AVS SNAPSHOT
After Visit Summary   11/30/2018    Rocio Elkins    MRN: [id number]           Patient Information     Date Of Birth          1985        Visit Information        Provider Department      11/30/2018 1:30 PM  INFUSION CHAIR 9 [facility] [place]        Today's Diagnoses     Hyperemesis gravidarum, antepartum    -  1    Supervision of high risk pregnancy in first trimester           Follow-ups after your visit        Your next 10 appointments already scheduled     Dec 27, 2018  1:30 PM CST   Genetic Counseling with  GEN COUNSELOR 1   [facility] Maternal Fetal Medicine - [facility] ([facility])    [address] [place] [address] [phone]            Dec 27, 2018  2:15 PM CST   MFM US COMP with [id number]   [facility] Maternal Fetal Medicine Ultrasound - [facility])    [address] [place] [id number] [phone]           Wear comfortable clothes and leave your valuables at home.            Dec 27, 2018  2:45 PM CST   Radiology MD with  BENNIE MD   [facility] Maternal Fetal Medicine - [facility])    [address] [place] [id number] [phone]           Please arrive at the time given for your first appointment. This visit is used internally to schedule the physician's time during your ultrasound.              Who to contact     If you have questions or need follow up information about today's clinic visit or your schedule please contact [facility] directly at [phone].  Normal or non-critical lab and imaging results will be communicated to you by MyChart, letter or phone within 4 business days after the clinic has received the results. If you do not hear from us within 7 days, please contact the clinic through MyChart or phone. If you have a critical or abnormal lab result, we will notify  you by phone as soon as possible.  Submit refill requests through [employer] or call your pharmacy and they will forward the refill request to us. Please allow 3 business days for your refill to be completed.          Additional Information About Your Visit        Care EveryWhere ID     This is your Care EveryWhere ID. This could be used by other organizations to access your [place] medical records  [id number]        Your Vitals Were     Pulse Temperature Respirations Last Period          98 98  F (36.7  C) (Oral) 16 08/16/2018 (Exact Date)         Blood Pressure from Last 3 Encounters:   11/30/18 101/65   11/23/18 97/63   11/20/18 98/52    Weight from Last 3 Encounters:   11/20/18 61.6 kg (135 lb 12.8 oz)   11/09/18 62.7 kg (138 lb 3.2 oz)   11/02/18 61.7 kg (136 lb)              Today, you had the following     No orders found for display       Primary Care Provider Fax #    Physician No Ref-Primary [phone]       No address on file        Equal Access to Services     MORELIA MONTANA : Hadii aad ku hadasho Soomaali, waaxda luqadaha, qaybta kaalmada adeegyada, lobito hernández . So [facility] [phone].    ATENCIÓN: Si habla español, tiene a de dios disposición servicios gratuitos de asistencia lingüística. [facility] [phone].    We comply with applicable federal civil rights laws and [place] laws. We do not discriminate on the basis of race, color, national origin, age, disability, sex, sexual orientation, or gender identity.            Thank you!     Thank you for choosing [facility] AND [facility]  for your care. Our goal is always to provide you with excellent care. Hearing back from our patients is one way we can continue to improve our services. Please take a few minutes to complete the written survey that you may receive in the mail after your visit with us. Thank you!             Your Updated Medication List - Protect others around you: Learn how to safely use, store  and throw away your medicines at www.disposemymeds.org.          This list is accurate as of 11/30/18  3:07 PM.  Always use your most recent med list.                   Brand Name Dispense Instructions for use Diagnosis    levothyroxine 150 MCG tablet    SYNTHROID/LEVOTHROID    90 tablet    Take 1 tablet (150 mcg) by mouth daily    Postablative hypothyroidism       ondansetron 4 MG ODT tab    ZOFRAN ODT    60 tablet    Take 1-2 tablets (4-8 mg) by mouth every 8 hours as needed for nausea    Hyperemesis gravidarum, antepartum       PREPLUS 27-1 MG Tabs      TK 1 T PO DAILY        vitamin D3 1000 units (25 mcg) tablet    CHOLECALCIFEROL     Take 1,000 Units by mouth

## 2018-11-30 NOTE — PROGRESS NOTES
Infusion Nursing Note:  Rocio Elkins presents today for IVF.    Patient seen by provider today: No   present during visit today: Not Applicable.    Note: N/A.    Intravenous Access:  Peripheral IV placed.    Treatment Conditions:  Not Applicable.      Post Infusion Assessment:  Patient tolerated infusion without incident.  Site patent and intact, free from redness, edema or discomfort.  No evidence of extravasations.  Access discontinued per protocol.    Discharge Plan:   AVS to patient via MYCHART.  Patient will return prn for next appointment.   Patient discharged in stable condition accompanied by: self.  Departure Mode: Ambulatory.    Allen Sims RN

## 2018-12-06 ENCOUNTER — INFUSION THERAPY VISIT (OUTPATIENT)
Dept: INFUSION THERAPY | Facility: CLINIC | Age: 33
End: 2018-12-06
Attending: ADVANCED PRACTICE MIDWIFE
Payer: COMMERCIAL

## 2018-12-06 ENCOUNTER — PRENATAL OFFICE VISIT (OUTPATIENT)
Dept: MIDWIFE SERVICES | Facility: CLINIC | Age: 33
End: 2018-12-06
Payer: COMMERCIAL

## 2018-12-06 VITALS — DIASTOLIC BLOOD PRESSURE: 60 MMHG | SYSTOLIC BLOOD PRESSURE: 118 MMHG | WEIGHT: 137 LBS | BODY MASS INDEX: 21.46 KG/M2

## 2018-12-06 VITALS
RESPIRATION RATE: 16 BRPM | HEART RATE: 101 BPM | TEMPERATURE: 97.8 F | SYSTOLIC BLOOD PRESSURE: 105 MMHG | DIASTOLIC BLOOD PRESSURE: 65 MMHG

## 2018-12-06 DIAGNOSIS — O26.812 FATIGUE DURING PREGNANCY IN SECOND TRIMESTER: ICD-10-CM

## 2018-12-06 DIAGNOSIS — E89.0 POSTABLATIVE HYPOTHYROIDISM: ICD-10-CM

## 2018-12-06 DIAGNOSIS — O09.91 SUPERVISION OF HIGH RISK PREGNANCY IN FIRST TRIMESTER: ICD-10-CM

## 2018-12-06 DIAGNOSIS — Z3A.16 16 WEEKS GESTATION OF PREGNANCY: ICD-10-CM

## 2018-12-06 DIAGNOSIS — O21.0 HYPEREMESIS GRAVIDARUM, ANTEPARTUM: Primary | ICD-10-CM

## 2018-12-06 LAB
ERYTHROCYTE [DISTWIDTH] IN BLOOD BY AUTOMATED COUNT: 13.3 % (ref 10–15)
HCT VFR BLD AUTO: 30.4 % (ref 35–47)
HGB BLD-MCNC: 10.6 G/DL (ref 11.7–15.7)
MCH RBC QN AUTO: 30.4 PG (ref 26.5–33)
MCHC RBC AUTO-ENTMCNC: 34.9 G/DL (ref 31.5–36.5)
MCV RBC AUTO: 87 FL (ref 78–100)
PLATELET # BLD AUTO: 156 10E9/L (ref 150–450)
RBC # BLD AUTO: 3.49 10E12/L (ref 3.8–5.2)
WBC # BLD AUTO: 10.5 10E9/L (ref 4–11)

## 2018-12-06 PROCEDURE — 84443 ASSAY THYROID STIM HORMONE: CPT | Performed by: NURSE PRACTITIONER

## 2018-12-06 PROCEDURE — 84439 ASSAY OF FREE THYROXINE: CPT | Performed by: NURSE PRACTITIONER

## 2018-12-06 PROCEDURE — 25000128 H RX IP 250 OP 636: Performed by: ADVANCED PRACTICE MIDWIFE

## 2018-12-06 PROCEDURE — 36415 COLL VENOUS BLD VENIPUNCTURE: CPT | Performed by: NURSE PRACTITIONER

## 2018-12-06 PROCEDURE — 96360 HYDRATION IV INFUSION INIT: CPT

## 2018-12-06 PROCEDURE — 99207 ZZC PRENATAL VISIT: CPT | Performed by: NURSE PRACTITIONER

## 2018-12-06 PROCEDURE — 85027 COMPLETE CBC AUTOMATED: CPT | Performed by: NURSE PRACTITIONER

## 2018-12-06 RX ORDER — PYRIDOXINE HCL (VITAMIN B6) 25 MG
25 TABLET ORAL DAILY
COMMUNITY
Start: 2018-12-06 | End: 2019-03-14

## 2018-12-06 RX ADMIN — SODIUM CHLORIDE, POTASSIUM CHLORIDE, SODIUM LACTATE AND CALCIUM CHLORIDE 1000 ML: 600; 310; 30; 20 INJECTION, SOLUTION INTRAVENOUS at 14:05

## 2018-12-06 ASSESSMENT — PAIN SCALES - GENERAL: PAINLEVEL: NO PAIN (0)

## 2018-12-06 NOTE — MR AVS SNAPSHOT
After Visit Summary   12/6/2018    Rocio Elkins    MRN: [id number]           Patient Information     Date Of Birth          1985        Visit Information        Provider Department      12/6/2018 2:50 PM [facility]        Today's Diagnoses     Hyperemesis gravidarum, antepartum    -  1    Postablative hypothyroidism        Fatigue during pregnancy in second trimester        16 weeks gestation of pregnancy          Care Instructions    Remedies for nausea and vomiting with pregnancy      Eat small frequent meals every 2-3 hours if possible.       Avoid food at extremes of temperature and drinks with carbonation.      Eat foods that appeal to you, avoiding fats and spicy foods.      Avoid liquids with foods.  Drink liquids 30-60 minutes before or after eating and sip slowly.      Bread, pasta, crackers, potatoes, and rice tend to be tolerated the best.      Don't worry about what you eat in the first 3 months, it is more important that you can eat and keep it down.       Try flat ginger ale or ginger tea      Before rising in the morning, eat a small amount of crackers or dry toast.      Peppermint tea      Ginger is a herbal remedy for nausea and you can use it in any form.  There are ginger tablets you can purchase.  The dose 1000 mg a day in divided doses.       You may also try doxylamine (Unisom) 12.5 mg three times a day which is a sleeping medication along with Vitamin B6 25 mg three times a day.  This combination takes up to a week to work so give it some time.       Benadryl (diphenhydramine) 25-50 mg every 8 hour or Dramamine (dimenhydrinate)  mg by mouth every 4-6 hours. Both of these medication may cause some drowsiness      Other things that may help include an Accupressure band and acupuncture      If these methods fail there are many prescriptions that we can try    If you begin to vomit more than 5 or 6 times a day and  feel that you are unable to keep anything down, call the [facility] at [phone]            Follow-ups after your visit        Follow-up notes from your care team     Return in about 1 week (around 12/13/2018), or if symptoms worsen or fail to improve, for Prenatal visit.      Your next 10 appointments already scheduled     Dec 13, 2018  1:00 PM CST   Level 2 with  INFUSION CHAIR 13   [facility] ([facility])    [facility] [place] [place]  [address]  [place] [address]   [phone]            Dec 13, 2018  2:30 PM CST   ESTABLISHED PRENATAL with OMAR Fields CNM   [facility] ([facility])    [address] [place] [address] [address]  [place] [address] [phone]            Dec 27, 2018  1:30 PM CST   Genetic Counseling with  GEN COUNSELOR 1   ealth Maternal Fetal Medicine [facility] ([facility])    [address] [place] [address]  [facility] [address]   [phone]            Dec 27, 2018  2:15 PM CST   MFM US COMP with [id number]   [facility] Maternal Fetal Medicine Ultrasound - [facility])    [address] [place] [address]  [facility] [address] [phone]           Wear comfortable clothes and leave your valuables at home.            Dec 27, 2018  2:45 PM CST   Radiology MD with  BENNIE MOLINA   ealth Maternal Fetal Medicine [facility])    [address] [place] [address] [phone]           Please arrive at the time given for your first appointment. This visit is used internally to schedule the physician's time during your ultrasound.              Who to contact     If you have questions or need follow up information about today's clinic visit or your schedule please contact [facility] [place] directly at [phone].  Normal or non-critical  lab and imaging results will be communicated to you by MyChart, letter or phone within 4 business days after the clinic has received the results. If you do not hear from us within 7 days, please contact the clinic through MyChart or phone. If you have a critical or abnormal lab result, we will notify you by phone as soon as possible.  Submit refill requests through [employer] or call your pharmacy and they will forward the refill request to us. Please allow 3 business days for your refill to be completed.          Additional Information About Your Visit        Care EveryWhere ID     This is your Care EveryWhere ID. This could be used by other organizations to access your [place] medical records  [id number]        Your Vitals Were     Last Period Breastfeeding? BMI (Body Mass Index)             08/16/2018 (Exact Date) No 21.46 kg/m2          Blood Pressure from Last 3 Encounters:   12/06/18 118/60   12/06/18 105/65   11/30/18 96/54    Weight from Last 3 Encounters:   12/06/18 137 lb (62.1 kg)   11/30/18 137 lb (62.1 kg)   11/20/18 135 lb 12.8 oz (61.6 kg)              We Performed the Following     CBC with platelets     T4 free     TSH          Today's Medication Changes          These changes are accurate as of 12/6/18 11:59 PM.  If you have any questions, ask your nurse or doctor.               Start taking these medicines.        Dose/Directions    vitamin B6 25 MG tablet   Commonly known as:  pyridOXINE   Used for:  Hyperemesis gravidarum, antepartum   Started by:  Faby Pérez APRN CNM        Dose:  25 mg   Take 1 tablet (25 mg) by mouth daily   Refills:  0            Where to get your medicines      Some of these will need a paper prescription and others can be bought over the counter.  Ask your nurse if you have questions.     You don't need a prescription for these medications     vitamin B6 25 MG tablet                Primary Care Provider Fax #    Physician No Ref-Primary [phone]       No  address on file        Equal Access to Services     [facility] RUBÉN : Hadii aad ku carlos Levine, waricoda luqkeya, qakristen tiffanymoriahjames paul, waxmonika el salazarpatriciarosa walter. So [facility] [phone].    ATENCIÓN: Si habla español, tiene a de dios disposición servicios gratuitos de asistencia lingüística. Llame al [phone].    We comply with applicable federal civil rights laws and [place] laws. We do not discriminate on the basis of race, color, national origin, age, disability, sex, sexual orientation, or gender identity.            Thank you!     Thank you for choosing [facility] [place]  for your care. Our goal is always to provide you with excellent care. Hearing back from our patients is one way we can continue to improve our services. Please take a few minutes to complete the written survey that you may receive in the mail after your visit with us. Thank you!             Your Updated Medication List - Protect others around you: Learn how to safely use, store and throw away your medicines at www.disposemymeds.org.          This list is accurate as of 12/6/18 11:59 PM.  Always use your most recent med list.                   Brand Name Dispense Instructions for use Diagnosis    levothyroxine 150 MCG tablet    SYNTHROID/LEVOTHROID    90 tablet    Take 1 tablet (150 mcg) by mouth daily    Postablative hypothyroidism       ondansetron 4 MG ODT tab    ZOFRAN ODT    60 tablet    Take 1-2 tablets (4-8 mg) by mouth every 8 hours as needed for nausea    Hyperemesis gravidarum, antepartum       PREPLUS 27-1 MG Tabs      TK 1 T PO DAILY        vitamin B6 25 MG tablet    pyridOXINE     Take 1 tablet (25 mg) by mouth daily    Hyperemesis gravidarum, antepartum       vitamin D3 1000 units (25 mcg) tablet    CHOLECALCIFEROL     Take 1,000 Units by mouth

## 2018-12-06 NOTE — MR AVS SNAPSHOT
After Visit Summary   12/6/2018    Rocio Elkins    MRN: [id number]           Patient Information     Date Of Birth          1985        Visit Information        Provider Department      12/6/2018 1:30 PM  INFUSION CHAIR 8 [facility]        Today's Diagnoses     Hyperemesis gravidarum, antepartum    -  1    Supervision of high risk pregnancy in first trimester           Follow-ups after your visit        Your next 10 appointments already scheduled     Dec 06, 2018  2:50 PM CST   [facility]ABL[facility] [place] ([facility] [place])    [address] [facility] [address]  [place] [address]   [phone]            Dec 13, 2018  1:00 PM CST   Level 2 with  INFUSION CHAIR 13   [facility] ([facility])    n [facility]  [address]  [place] [address]   [phone]            Dec 27, 2018  1:30 PM CST   Genetic Counseling with  GEN COUNSELOR 1   [facility] Maternal Fetal Medicine [facility] ([facility])    [address] [place] [address]  [place] [address]   [phone]            Dec 27, 2018  2:15 PM CST   MFM US COMP with MFMUSR1   ealth Maternal Fetal Medicine Ultrasound - [facility])    [address] [place] [address]  [facility] [address]   [phone]           Wear comfortable clothes and leave your valuables at home.            Dec 27, 2018  2:45 PM CST   Radiology MD with  MFM MD   ealth Maternal Fetal Medicine - [facility])    [address] [place] [address]  [facility] [address]   [phone]           Please arrive at the time given for your first appointment. This visit is used internally to schedule the physician's time during your ultrasound.              Who to contact     If you have questions or  need follow up information about today's clinic visit or your schedule please contact [facility] directly at [phone].  Normal or non-critical lab and imaging results will be communicated to you by MyChart, letter or phone within 4 business days after the clinic has received the results. If you do not hear from us within 7 days, please contact the clinic through MyChart or phone. If you have a critical or abnormal lab result, we will notify you by phone as soon as possible.  Submit refill requests through [employer] or call your pharmacy and they will forward the refill request to us. Please allow 3 business days for your refill to be completed.          Additional Information About Your Visit        Care EveryWhere ID     This is your Care EveryWhere ID. This could be used by other organizations to access your [place] medical records  [id number]        Your Vitals Were     Pulse Temperature Respirations Last Period          101 97.8  F (36.6  C) (Oral) 16 08/16/2018 (Exact Date)         Blood Pressure from Last 3 Encounters:   12/06/18 105/65   11/30/18 96/54   11/30/18 101/65    Weight from Last 3 Encounters:   11/30/18 62.1 kg (137 lb)   11/20/18 61.6 kg (135 lb 12.8 oz)   11/09/18 62.7 kg (138 lb 3.2 oz)              Today, you had the following     No orders found for display       Primary Care Provider Fax #    Physician No Ref-Primary [phone]       No address on file        Equal Access to Services     MORELIA MONTANA : Hadii farzad Levine, waaxda lufloyd, qaybta kaalmada humberto, lobito walter. So [facility] [phone].    ATENCIÓN: Si habla español, tiene a de dios disposición servicios gratuitos de asistencia lingüística. Llame al [phone].    We comply with applicable federal civil rights laws and [place] laws. We do not discriminate on the basis of race, color, national origin, age, disability, sex, sexual orientation, or gender  identity.            Thank you!     Thank you for choosing [facility] AND [facility]ENTER  for your care. Our goal is always to provide you with excellent care. Hearing back from our patients is one way we can continue to improve our services. Please take a few minutes to complete the written survey that you may receive in the mail after your visit with us. Thank you!             Your Updated Medication List - Protect others around you: Learn how to safely use, store and throw away your medicines at www.disposemymeds.org.          This list is accurate as of 12/6/18  2:41 PM.  Always use your most recent med list.                   Brand Name Dispense Instructions for use Diagnosis    levothyroxine 150 MCG tablet    SYNTHROID/LEVOTHROID    90 tablet    Take 1 tablet (150 mcg) by mouth daily    Postablative hypothyroidism       ondansetron 4 MG ODT tab    ZOFRAN ODT    60 tablet    Take 1-2 tablets (4-8 mg) by mouth every 8 hours as needed for nausea    Hyperemesis gravidarum, antepartum       PREPLUS 27-1 MG Tabs      TK 1 T PO DAILY        vitamin D3 1000 units (25 mcg) tablet    CHOLECALCIFEROL     Take 1,000 Units by mouth

## 2018-12-06 NOTE — PROGRESS NOTES
Clinic Care Coordination Contact  [facility]/Voicemail     Clinical Data:  Outreach- SW received VM from Pt today with a request for CB as Pt had some additional questions.      Outreach attempted x 2. SW left message on voicemail with call back information and requested return call. SW advised on VM's left with one another previously, encouraged Pt to follow-up if there were questions or concerns this writer can support.     Plan: SW will await CB from Pt. SW will mail letter to home in 1 weeks time if no CB.      CHICA Greene, [facility]  , Care Coordination   [facility]  [phone]  [email or web address]

## 2018-12-06 NOTE — LETTER
December 6, 2018      Rocio Elkins  [address] [place] [address]   [facility] [address]        To Whom It May Concern:    Rocio Elkins was seen in our clinic. She is unable to work at this time due to nausea, vomiting, fatigue and dizziness.  She is scheduled for an evaluation in our office 12/13/18.    Please contact our clinic with any questions or concerns.     Sincerely,        OMAR Richardson CNM

## 2018-12-06 NOTE — PROGRESS NOTES
Infusion Nursing Note:  Rocio Elkins presents today for IVF.    Patient seen by provider today: No   present during visit today: Not Applicable.    Note: N/A.    Intravenous Access:  Peripheral IV placed.    Treatment Conditions:  Not Applicable.      Post Infusion Assessment:  Patient tolerated infusion without incident.  Site patent and intact, free from redness, edema or discomfort.  No evidence of extravasations.  Access discontinued per protocol.    Discharge Plan:   Copy of AVS reviewed with patient and/or family.  Patient will return in one week as [facility] for next appointment.  Patient discharged in stable condition accompanied by: self.  Departure Mode: Ambulatory.    Allen Sims RN

## 2018-12-06 NOTE — PROGRESS NOTES
"Rocio states that she continues to have daily intermittent nausea with vomiting, dizziness and fatigue.  She states that some days are better than others, but the dizziness/nausea/vomiting will \"hit her\" without warning.  She attempted to return to work, but was unable to perform her work duties.  She is continuing to take Zofran ODT 1-2 times/day and has not been taking daily B6.    Fetal movement No  Denies loss of fluid/vb/contractions/pelvic pain  declined AFP today  Anatomy ultrasound scheduled at [facility]  Work note given to be off for 1 week with re-evaluation 12/13/18  Continue IV infusions 2xweek  Counseled to take vitamin B6 25mg BID, not PRN  TSH, free T4, CBC done today.    Return to clinic 1 week    Faby NELSON CNM      "

## 2018-12-07 ENCOUNTER — TELEPHONE (OUTPATIENT)
Dept: MIDWIFE SERVICES | Facility: CLINIC | Age: 33
End: 2018-12-07

## 2018-12-07 LAB
T4 FREE SERPL-MCNC: 1.43 NG/DL (ref 0.76–1.46)
TSH SERPL DL<=0.005 MIU/L-ACNC: 0.06 MU/L (ref 0.4–4)

## 2018-12-07 NOTE — PROGRESS NOTES
I left a voicemail for Rocio and informed her of results. Needs to follow up with her endocrinologist. Will continue to monitor hemoglobin.    Vee Singh

## 2018-12-07 NOTE — TELEPHONE ENCOUNTER
Clinic Action Needed: Yes, Please call Rocio back.   FNA Triage Call  Presenting Problem: Rocio calling back to inform that she does not see Endocrinologist. She sees a 'regular' doctor. She wants to know if you want her to start seeing endocrinology or if she needs to see her regular doctor to change her dosage of Levothyroxine. She also is wondering about her hemoglobin. She states that she feels really tired all the time. Please call her back at [phone]      Routed to: MIKE Mehta RN/FNA

## 2018-12-07 NOTE — PATIENT INSTRUCTIONS
Remedies for nausea and vomiting with pregnancy      Eat small frequent meals every 2-3 hours if possible.       Avoid food at extremes of temperature and drinks with carbonation.      Eat foods that appeal to you, avoiding fats and spicy foods.      Avoid liquids with foods.  Drink liquids 30-60 minutes before or after eating and sip slowly.      Bread, pasta, crackers, potatoes, and rice tend to be tolerated the best.      Don't worry about what you eat in the first 3 months, it is more important that you can eat and keep it down.       Try flat ginger ale or ginger tea      Before rising in the morning, eat a small amount of crackers or dry toast.      Peppermint tea      Ginger is a herbal remedy for nausea and you can use it in any form.  There are ginger tablets you can purchase.  The dose 1000 mg a day in divided doses.       You may also try doxylamine (Unisom) 12.5 mg three times a day which is a sleeping medication along with Vitamin B6 25 mg three times a day.  This combination takes up to a week to work so give it some time.       Benadryl (diphenhydramine) 25-50 mg every 8 hour or Dramamine (dimenhydrinate)  mg by mouth every 4-6 hours. Both of these medication may cause some drowsiness      Other things that may help include an Accupressure band and acupuncture      If these methods fail there are many prescriptions that we can try    If you begin to vomit more than 5 or 6 times a day and feel that you are unable to keep anything down, call the [facility] at [phone]

## 2018-12-08 NOTE — TELEPHONE ENCOUNTER
Called Rocio to discuss thyroid labs, will follow up with her primary care to get her dose of levothyroxine adjusted. We also discussed iron rich foods, she reports some dizziness but it is hard to say whether that is related to her weight loss/lack of appetite or anemia since her level is 10.6 and 11 is considered normal. Encouraged to attempt taking in iron rich foods.    Rocio also needs a letter at her next visit to help her get a visa for her mother in law so her mother in law can come and help her after the baby is born.    OMAR Maya, CNM

## 2018-12-10 ENCOUNTER — TELEPHONE (OUTPATIENT)
Dept: FAMILY MEDICINE | Facility: CLINIC | Age: 33
End: 2018-12-10

## 2018-12-10 DIAGNOSIS — E89.0 POSTABLATIVE HYPOTHYROIDISM: ICD-10-CM

## 2018-12-10 NOTE — PROGRESS NOTES
Clinic Care Coordination Contact    SW received a VM from Pt in return of writer outreach attempt last week. Pt states that the information provided previously was helpful. Pt reports her questions have been answered. Pt denies additional questions at this time.     SW will not outreach back again at this time. SW available going forward if needs arise.     CHICA Greene, [facility]  , Care Coordination   [facility]  [phone]  [email or web address]

## 2018-12-10 NOTE — TELEPHONE ENCOUNTER
Patient states that she would like to know if the dose needs to be changed on her thyroid medication.  Please call.    Thank you.

## 2018-12-10 NOTE — TELEPHONE ENCOUNTER
I am not the pt's endocrinologist.  She should either see an Ob provider that can handle this or get in with an endocrinologist.  I saw this pt once and she lives over an hour away so I doubt she will be returning to me.     She should have her dose adjusted slightly and over replacement could impact her vomiting.

## 2018-12-12 DIAGNOSIS — E89.0 POSTABLATIVE HYPOTHYROIDISM: Primary | ICD-10-CM

## 2018-12-12 DIAGNOSIS — E05.00 THYROTOXIC EXOPHTHALMOS: ICD-10-CM

## 2018-12-12 DIAGNOSIS — O09.91 SUPERVISION OF HIGH RISK PREGNANCY IN FIRST TRIMESTER: ICD-10-CM

## 2018-12-12 NOTE — TELEPHONE ENCOUNTER
Per patient, Dr Delia Mcdaniel is her primary care physician.  Per patient now has preferred one and wants to see Dr Delia Mcdaniel.  Per patient, the midwife, who ordered the labs advised patient to contact the pcp.    Patient wonders why Dr Delia Mcdaniel is not caring for her when this message has been repeatedly sent to her.  Attempted to explain to patient repeatedly that there was miscommunication and Dr Delia Mcdaniel was not aware she was the pcp.   Will review with Dr Delia Mcdaniel and determine plan.     TSH   Date Value Ref Range Status   12/06/2018 0.06 (L) 0.40 - 4.00 mU/L Final     T4 Free   Date Value Ref Range Status   12/06/2018 1.43 0.76 - 1.46 ng/dL Final     Please advise  Lida White RN

## 2018-12-12 NOTE — PROGRESS NOTES
Referral placed for Endocrinologist ASAP to manage thyroid medication.      Faby NELSON, BARTOLO, NP-[facility]

## 2018-12-12 NOTE — TELEPHONE ENCOUNTER
Asim Hilton    I have placed an ASAP Endrinology referral for her.  The Endocrinolgist clinic is in [place].  They should be contacting her for a follow up appointment.      Sorry for the confusion.    Faby Valentni CNM

## 2018-12-13 ENCOUNTER — INFUSION THERAPY VISIT (OUTPATIENT)
Dept: INFUSION THERAPY | Facility: CLINIC | Age: 33
End: 2018-12-13
Attending: ADVANCED PRACTICE MIDWIFE
Payer: COMMERCIAL

## 2018-12-13 ENCOUNTER — PRENATAL OFFICE VISIT (OUTPATIENT)
Dept: MIDWIFE SERVICES | Facility: CLINIC | Age: 33
End: 2018-12-13
Payer: COMMERCIAL

## 2018-12-13 VITALS
TEMPERATURE: 97.8 F | DIASTOLIC BLOOD PRESSURE: 52 MMHG | HEART RATE: 93 BPM | SYSTOLIC BLOOD PRESSURE: 91 MMHG | OXYGEN SATURATION: 97 % | RESPIRATION RATE: 16 BRPM

## 2018-12-13 VITALS — SYSTOLIC BLOOD PRESSURE: 100 MMHG | DIASTOLIC BLOOD PRESSURE: 56 MMHG | WEIGHT: 142.8 LBS | BODY MASS INDEX: 22.37 KG/M2

## 2018-12-13 DIAGNOSIS — O09.91 SUPERVISION OF HIGH RISK PREGNANCY IN FIRST TRIMESTER: ICD-10-CM

## 2018-12-13 DIAGNOSIS — E89.0 POSTABLATIVE HYPOTHYROIDISM: ICD-10-CM

## 2018-12-13 DIAGNOSIS — E05.00 THYROTOXIC EXOPHTHALMOS: ICD-10-CM

## 2018-12-13 DIAGNOSIS — Z3A.17 17 WEEKS GESTATION OF PREGNANCY: ICD-10-CM

## 2018-12-13 DIAGNOSIS — O21.0 HYPEREMESIS GRAVIDARUM, ANTEPARTUM: Primary | ICD-10-CM

## 2018-12-13 PROCEDURE — 25000128 H RX IP 250 OP 636: Performed by: ADVANCED PRACTICE MIDWIFE

## 2018-12-13 PROCEDURE — 96360 HYDRATION IV INFUSION INIT: CPT

## 2018-12-13 PROCEDURE — 99207 ZZC PRENATAL VISIT: CPT | Performed by: ADVANCED PRACTICE MIDWIFE

## 2018-12-13 RX ORDER — LEVOTHYROXINE SODIUM 137 UG/1
137 TABLET ORAL DAILY
Qty: 90 TABLET | Refills: 3 | Status: SHIPPED | OUTPATIENT
Start: 2018-12-13 | End: 2019-10-28

## 2018-12-13 RX ORDER — LEVOTHYROXINE SODIUM 137 UG/1
137 TABLET ORAL DAILY
Qty: 90 TABLET | Refills: 3 | Status: SHIPPED | OUTPATIENT
Start: 2018-12-13 | End: 2018-12-13

## 2018-12-13 RX ADMIN — SODIUM CHLORIDE, POTASSIUM CHLORIDE, SODIUM LACTATE AND CALCIUM CHLORIDE 1000 ML: 600; 310; 30; 20 INJECTION, SOLUTION INTRAVENOUS at 13:13

## 2018-12-13 ASSESSMENT — PAIN SCALES - GENERAL: PAINLEVEL: NO PAIN (0)

## 2018-12-13 NOTE — LETTER
2018      Rocio Elkins  [address]   [place] [address]        To Whom It May Concern,     Rocio Elkins is currently pregnant with her first child and she is being cared for at our clinic in [facility].  Her due date is May 23, 2019.  The father of the baby is Daryl Ramos, to whom Rocio is .  Rocio's mother-in-law, Jenifer Myers, will be needed in [place], [place] prior to, during and after Rocio's childbirth.  Renajerome's presence is necessary to help provide care for Rocio, the  child, and the household.        If you have questions or concerns, please call the clinic at the number listed above.    Sincerely,             OMAR Madden CNM

## 2018-12-13 NOTE — PROGRESS NOTES
"Rocio is here alone for her weekly visit.  She had one infusion this week, immediately before this appointment.  She states that she is still vomiting every 3-4 days and is nauseated every other day. Rocio states that she has \"some good days and some bad days\". Continues to have dizziness and lightheadedness and fatigue. Some days good, some days bad. Taking Zofran every other day; has not been taking B6, though she states it does make her feel better.  Recommended she take 25mg twice daily on a schedule; it may also help improve fatigue. Rocio does not feel that she is able to perform her work duties yet.     Rocio's TSH was 0.06 on 12/6/18 with a Free T4 of 1.43.  Rocio states that she attempted to make an appointment with the PCP that had originally started her levothyroxine but was unable to make an appointment or receive a response to her calls, per her report.  An ASAP endocrine referral was placed by a CNM yesterday but Rocio has not received a call yet.  Our  assisted Rocio in scheduling an appointment for tomorrow at [facility] with a PCP.     Fetal movement: No  Denies loss of fluid/vb/contractions/pelvic pain  Work note faxed to absence management for one week; to be re-evaluated on 12/21/18.  Continue IV infusions; advised Rocio to go 2x this week.   Rocio needs a letter for [place] [employer] so that her Mother-in-Law can come to the [place] for the birth. Rocio will get back to us with the correct spelling of her Mother-in-Law's name. Will provide the letter at visit next week.     Return to clinic in 12/21/18    Rachel Sky, MADELINE, APRN, CNM    "

## 2018-12-13 NOTE — NURSING NOTE
Infusion Nursing Note:  Rocio MAU Chongclifford presents today for IV fluids.    Patient seen by provider today: Yes: sees primary after this appt.   present during visit today: Not Applicable.    Note: Still experiencing nausea, weakness. Mild HA.17 weeks pregnant.    Intravenous Access:  Peripheral IV placed.    Treatment Conditions:  Not Applicable.      Post Infusion Assessment:  Patient tolerated infusion without incident.  Blood return noted pre and post infusion.  Site patent and intact, free from redness, edema or discomfort.  No evidence of extravasations.  Access discontinued per protocol.    Discharge Plan:   Discharge instructions reviewed with: Patient.  Patient and/or family verbalized understanding of discharge instructions and all questions answered.  Copy of AVS reviewed with patient and/or family.  Patient will return 12/21 for next appointment.  Patient discharged in stable condition accompanied by: self.  Departure Mode: Ambulatory.    Alexandra Zuniga RN

## 2018-12-13 NOTE — TELEPHONE ENCOUNTER
Please notify pt that I have sent a new dose to her pharmacy and she should make an appt with me in 2 months after having her blood test completed in 2 months to assess her levels.

## 2018-12-14 ENCOUNTER — TELEPHONE (OUTPATIENT)
Dept: MIDWIFE SERVICES | Facility: CLINIC | Age: 33
End: 2018-12-14

## 2018-12-14 NOTE — TELEPHONE ENCOUNTER
Pt states she has started taking levothyroxine that was ordered by Dr. Delia Mcdaniel. She will f/u with Dr. Mcdaniel as advised in 2 months who will manage her hypothyroid.  Floresita Sherman RN on 12/14/2018 at 9:26 AM

## 2018-12-14 NOTE — TELEPHONE ENCOUNTER
Pt calling with mother in laws name for immigration letter.   First name: Jenifer  Middle name: Kirk   Last name: Lucia    Will route to Rachel Sky CNM    FYI reminded pt to take first available endocrinology appointment, pt states she will.    Floresita Sherman RN on 12/14/2018 at 12:51 PM

## 2018-12-14 NOTE — TELEPHONE ENCOUNTER
Letter completed, will provide to Dick at her visit next week.  Thank you!    Rachel Sky, DNP, APRN, CNM

## 2018-12-14 NOTE — TELEPHONE ENCOUNTER
Pt notified via phone of provider message as written below per Dr. Mcdaniel. Pt is currently driving and states she will call back to schedule the lab and office visit appointments.  Pt requests that levothyroxine order be cancelled at [facility] and instead sent to [facility] in [place] on [address]  Pt indicates understanding of issues and agrees with the plan.    Eduarda Cuellar RN

## 2018-12-20 ENCOUNTER — PRE VISIT (OUTPATIENT)
Dept: MATERNAL FETAL MEDICINE | Facility: CLINIC | Age: 33
End: 2018-12-20

## 2018-12-21 ENCOUNTER — INFUSION THERAPY VISIT (OUTPATIENT)
Dept: INFUSION THERAPY | Facility: CLINIC | Age: 33
End: 2018-12-21
Attending: ADVANCED PRACTICE MIDWIFE
Payer: COMMERCIAL

## 2018-12-21 ENCOUNTER — PRENATAL OFFICE VISIT (OUTPATIENT)
Dept: MIDWIFE SERVICES | Facility: CLINIC | Age: 33
End: 2018-12-21
Payer: COMMERCIAL

## 2018-12-21 VITALS
DIASTOLIC BLOOD PRESSURE: 67 MMHG | OXYGEN SATURATION: 100 % | RESPIRATION RATE: 16 BRPM | TEMPERATURE: 97.8 F | HEART RATE: 91 BPM | SYSTOLIC BLOOD PRESSURE: 100 MMHG

## 2018-12-21 VITALS — WEIGHT: 140.4 LBS | SYSTOLIC BLOOD PRESSURE: 98 MMHG | DIASTOLIC BLOOD PRESSURE: 60 MMHG | BODY MASS INDEX: 21.99 KG/M2

## 2018-12-21 DIAGNOSIS — O09.91 SUPERVISION OF HIGH RISK PREGNANCY IN FIRST TRIMESTER: ICD-10-CM

## 2018-12-21 DIAGNOSIS — Z3A.18 18 WEEKS GESTATION OF PREGNANCY: ICD-10-CM

## 2018-12-21 DIAGNOSIS — O21.0 HYPEREMESIS GRAVIDARUM, ANTEPARTUM: ICD-10-CM

## 2018-12-21 DIAGNOSIS — O21.0 HYPEREMESIS GRAVIDARUM, ANTEPARTUM: Primary | ICD-10-CM

## 2018-12-21 DIAGNOSIS — O09.91 SUPERVISION OF HIGH RISK PREGNANCY IN FIRST TRIMESTER: Primary | ICD-10-CM

## 2018-12-21 PROCEDURE — 99207 ZZC PRENATAL VISIT: CPT | Performed by: ADVANCED PRACTICE MIDWIFE

## 2018-12-21 PROCEDURE — 96360 HYDRATION IV INFUSION INIT: CPT

## 2018-12-21 PROCEDURE — 25000128 H RX IP 250 OP 636: Performed by: ADVANCED PRACTICE MIDWIFE

## 2018-12-21 RX ADMIN — SODIUM CHLORIDE, POTASSIUM CHLORIDE, SODIUM LACTATE AND CALCIUM CHLORIDE 1000 ML: 600; 310; 30; 20 INJECTION, SOLUTION INTRAVENOUS at 13:24

## 2018-12-21 NOTE — PROGRESS NOTES
Infusion Nursing Note:  Rocio MAU Chongclifford presents today for IVF.    Patient seen by provider today: No   present during visit today: Not Applicable.    Note: NA    Intravenous Access:  Peripheral IV placed.    Treatment Conditions:  Not Applicable.      Post Infusion Assessment:  Patient tolerated infusion without incident.  Blood return noted pre and post infusion.  Site patent and intact, free from redness, edema or discomfort.  No evidence of extravasations.  Access discontinued per protocol.    Discharge Plan:   Copy of AVS reviewed with patient and/or family.  Patient will return as scheduled for next appointment.  Patient discharged in stable condition accompanied by: self and .  Departure Mode: Ambulatory.    Kiki Richardson RN

## 2018-12-21 NOTE — PROGRESS NOTES
"Dick is here today with her  for her weekly visit. She states that, little by little, she is feeling better.  She continues to have nausea, fatigue, headache, loss of appetite and dizziness approximately every other day. Dick states that she has not vomited at all this week.  She has been taking Zofran \"as needed\", which is approximately once every other day.  She has been taking her vitamin B6 twice daily on a schedule, which she thinks has been helping. Dick's energy has improved.     Dick did get a call back from her original primary care provider who changed her levothyroxine dose. She also saw her other primary care provider within the [facility] system.  Dick will have her level drawn again in 2 months. She states that she still has not received a call from Endocrine after her referral was placed last week. Advised Dick to schedule an appointment with them when they call, because she should establish an ongoing relationship with an endocrinologist. If she hasn't heard from them by next week, we will put through another referral.    Dick would like to attempt to go back to work again.  Due to next week being a short week due to the Christmas holiday, letter sent to absence management for Dick to work the  for two 4-hour shifts next week.  Restrictions will be re-evaluated on 12/28/18.    Fetal movement: No  Denies loss of fluid/vb/contractions/pelvic pain  Discussed warning signs and when to call  Continue IV infusions as needed  Letter provided for the [facility] for Dick's mother-in-law    Anatomy ultrasound scheduled at [facility] on December 27th. Return to clinic on 12/28/18. Make sure Endocrine appt has been made.    Rachel Sky, DNP, APRN, CNM    "

## 2018-12-21 NOTE — LETTER
December 21, 2018      Rocio Elkins  [address]   [place] [address]        To Whom It May Concern:    Rocio Elkins attended clinic here on Dec 21, 2018 and may returning to work for 4 hours per day, two days per week on Dec 24, 2018.      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    Work restrictions will be reevaluated on Dec 28, 2018.    If you have questions or concerns, please call the clinic at the number listed above.            OMAR Madden CNM

## 2018-12-21 NOTE — PATIENT INSTRUCTIONS
Remedies for nausea and vomiting with pregnancy      Eat small frequent meals every 2-3 hours if possible.       Avoid food at extremes of temperature and drinks with carbonation.      Eat foods that appeal to you, avoiding fats and spicy foods.      Avoid liquids with foods.  Drink liquids 30-60 minutes before or after eating and sip slowly.      Bread, pasta, crackers, potatoes, and rice tend to be tolerated the best.      Don't worry about what you eat in the first 3 months, it is more important that you can eat and keep it down.       Try flat ginger ale or ginger tea      Before rising in the morning, eat a small amount of crackers or dry toast.      Peppermint tea      Ginger is a herbal remedy for nausea and you can use it in any form.  There are ginger tablets you can purchase.  The dose 1000 mg a day in divided doses.       You may also try doxylamine (Unisom) 12.5 mg three times a day which is a sleeping medication along with Vitamin B6 25 mg three times a day.  This combination takes up to a week to work so give it some time.       Benadryl (diphenhydramine) 25-50 mg every 8 hour or Dramamine (dimenhydrinate)  mg by mouth every 4-6 hours. Both of these medication may cause some drowsiness      Other things that may help include an Accupressure band and acupuncture      If these methods fail there are many prescriptions that we can try    If you begin to vomit more than 5 or 6 times a day and feel that you are unable to keep anything down, call the [facility] at [phone]

## 2018-12-27 ENCOUNTER — OFFICE VISIT (OUTPATIENT)
Dept: MATERNAL FETAL MEDICINE | Facility: CLINIC | Age: 33
End: 2018-12-27
Attending: NURSE PRACTITIONER
Payer: COMMERCIAL

## 2018-12-27 ENCOUNTER — HOSPITAL ENCOUNTER (OUTPATIENT)
Dept: ULTRASOUND IMAGING | Facility: CLINIC | Age: 33
Discharge: HOME | End: 2018-12-27
Attending: NURSE PRACTITIONER | Admitting: NURSE PRACTITIONER
Payer: COMMERCIAL

## 2018-12-27 DIAGNOSIS — O26.90 PREGNANCY RELATED CONDITION, ANTEPARTUM: ICD-10-CM

## 2018-12-27 DIAGNOSIS — O09.512 ADVANCED MATERNAL AGE, 1ST PREGNANCY, SECOND TRIMESTER: Primary | ICD-10-CM

## 2018-12-27 DIAGNOSIS — O09.512 SUPERVISION OF ELDERLY PRIMIGRAVIDA IN SECOND TRIMESTER: Primary | ICD-10-CM

## 2018-12-27 DIAGNOSIS — E07.9 THYROID DISEASE DURING PREGNANCY IN SECOND TRIMESTER: ICD-10-CM

## 2018-12-27 DIAGNOSIS — O99.282 THYROID DISEASE DURING PREGNANCY IN SECOND TRIMESTER: ICD-10-CM

## 2018-12-27 DIAGNOSIS — O21.0 HYPEREMESIS AFFECTING PREGNANCY, ANTEPARTUM: ICD-10-CM

## 2018-12-27 PROBLEM — O44.02 PLACENTA PREVIA WITHOUT HEMORRHAGE IN SECOND TRIMESTER: Status: ACTIVE | Noted: 2018-12-27

## 2018-12-27 PROCEDURE — 76811 OB US DETAILED SNGL FETUS: CPT

## 2018-12-27 PROCEDURE — 96040 ZZH GENETIC COUNSELING, EACH 30 MINUTES: CPT | Mod: ZF | Performed by: GENETIC COUNSELOR, MS

## 2018-12-27 NOTE — PROGRESS NOTES
Please see ultrasound report under imaging tab for details on ultrasound performed today.    Aga Cuellar MD  , OB/GYN  Maternal-Fetal Medicine  [email or web address]  [phone] (Academic office)  [phone] (Pager)

## 2018-12-27 NOTE — PROGRESS NOTES
[facility]  Genetic Counseling Consult    Patient:  Rocio Elkins YOB: 1985   Date of Service:  18      Rocio Elkins was seen at the [facility] for genetic consultation as part of her appointment for comprehensive ultrasound.  The indication for genetic counseling is advanced maternal age.       Impression/Plan:   1. Rocio has declined serum screening in this pregnancy.    2. Rocio had a comprehensive (level II) ultrasound today.  Please see the ultrasound report for further details.    3. The patient declines genetic amniocentesis and maternal serum screening today.    Pregnancy History:   /Parity:    Age at Delivery: 33 year old  ART: 2019, by Last Menstrual Period  Gestational Age: 19w0d    No significant complications or exposures were reported in the current pregnancy.    Medical History:   Rocio s reported medical history is not expected to impact pregnancy management or risks to fetal development.       Family History:   A three-generation pedigree was obtained, and is scanned under the  Media  tab.   The reported family history is negative for multiple miscarriages, stillbirths, birth defects, mental retardation, known genetic conditions, and consanguinity. Limited information is available regarding Rocio's 's family as they are in [facility].        Carrier Screening:   The patient reports that she and the father of the pregnancy have  ancestry:      The hemoglobinopathies are a group of genetic blood diseases that occur with increased frequency in individuals of  ancestry and carrier screening for these conditions is available.  Carrier screening for the hemoglobinopathies includes a CBC with red blood cell indices, a ferritin level, and a quantitative hemoglobin electrophoresis or HPLC.  In addition,  screening in the [facility] includes many of the  hemoglobinopathies.      Expanded carrier screening for mutations in a large panel of genes associated with autosomal recessive conditions including cystic fibrosis, spinal muscular atrophy, and others, is now available.      Carrier screening was not discussed today.       Risk Assessment for Chromosome Conditions:   We explained that the risk for fetal chromosome abnormalities increases with maternal age. We discussed specific features of common chromosome abnormalities, including Down syndrome, trisomy 13, trisomy 18, and sex chromosome trisomies.      - At age 34 at midtrimester, the risk to have a baby with Down syndrome is 1 in 216.     - At age 34 at midtrimester, the risk to have a baby with any chromosome abnormality is 1 in 105.       Rocio did not have maternal serum screening earlier in pregnancy.       Testing Options:   We discussed the following options:   Non-invasive Prenatal Testing (NIPT)    Maternal plasma cell-free DNA testing; first trimester ultrasound with nuchal translucency and nasal bone assessment is recommended, when appropriate    Screens for fetal trisomy 21, trisomy 13, trisomy 18, and sex chromosome aneuploidy    Cannot screen for open neural tube defects; maternal serum AFP after 15 weeks is recommended       Genetic Amniocentesis    Invasive procedure typically performed in the second trimester by which amniotic fluid is obtained for the purpose of chromosome analysis and/or other prenatal genetic analysis    Diagnostic results; >99% sensitivity for fetal chromosome abnormalities    AFAFP measurement tests for open neural tube defects       Comprehensive (Level II) ultrasound: Detailed ultrasound performed between 18-22 weeks gestation to screen for major birth defects and markers for aneuploidy.        We reviewed the benefits and limitations of this testing.  Screening tests provide a risk assessment specific to the pregnancy for certain fetal chromosome abnormalities, but cannot  definitively diagnose or exclude a fetal chromosome abnormality.  Follow-up genetic counseling and consideration of diagnostic testing is recommended with any abnormal screening result.     Diagnostic tests carry inherent risks- including risk of miscarriage- that require careful consideration.  These tests can detect fetal chromosome abnormalities with greater than 99% certainty.  Results can be compromised by maternal cell contamination or mosaicism, and are limited by the resolution of cytogenetic G-banding technology.  There is no screening nor diagnostic test that can detect all forms of birth defects or mental disability.    It was a pleasure to be involved with Rocio s care. Face-to-face time of the meeting was 30 minutes.      Albertina Rea MS, [facility]  Maternal Fetal Medicine  [facility]  Ph: [phone]

## 2018-12-28 ENCOUNTER — INFUSION THERAPY VISIT (OUTPATIENT)
Dept: INFUSION THERAPY | Facility: CLINIC | Age: 33
End: 2018-12-28
Attending: ADVANCED PRACTICE MIDWIFE
Payer: COMMERCIAL

## 2018-12-28 ENCOUNTER — PRENATAL OFFICE VISIT (OUTPATIENT)
Dept: MIDWIFE SERVICES | Facility: CLINIC | Age: 33
End: 2018-12-28
Payer: COMMERCIAL

## 2018-12-28 VITALS — WEIGHT: 140 LBS | BODY MASS INDEX: 21.93 KG/M2 | DIASTOLIC BLOOD PRESSURE: 68 MMHG | SYSTOLIC BLOOD PRESSURE: 104 MMHG

## 2018-12-28 VITALS
SYSTOLIC BLOOD PRESSURE: 95 MMHG | DIASTOLIC BLOOD PRESSURE: 67 MMHG | HEART RATE: 100 BPM | TEMPERATURE: 97.8 F | RESPIRATION RATE: 20 BRPM

## 2018-12-28 DIAGNOSIS — Z3A.19 19 WEEKS GESTATION OF PREGNANCY: ICD-10-CM

## 2018-12-28 DIAGNOSIS — O99.012 ANEMIA AFFECTING PREGNANCY IN SECOND TRIMESTER: ICD-10-CM

## 2018-12-28 DIAGNOSIS — O21.0 HYPEREMESIS GRAVIDARUM, ANTEPARTUM: Primary | ICD-10-CM

## 2018-12-28 DIAGNOSIS — O09.92 SUPERVISION OF HIGH RISK PREGNANCY IN SECOND TRIMESTER: Primary | ICD-10-CM

## 2018-12-28 DIAGNOSIS — O44.02 PLACENTA PREVIA WITHOUT HEMORRHAGE IN SECOND TRIMESTER: ICD-10-CM

## 2018-12-28 DIAGNOSIS — E89.0 POSTABLATIVE HYPOTHYROIDISM: ICD-10-CM

## 2018-12-28 DIAGNOSIS — O09.92 SUPERVISION OF HIGH RISK PREGNANCY IN SECOND TRIMESTER: ICD-10-CM

## 2018-12-28 DIAGNOSIS — O21.0 HYPEREMESIS GRAVIDARUM, ANTEPARTUM: ICD-10-CM

## 2018-12-28 PROCEDURE — 99207 ZZC PRENATAL VISIT: CPT | Performed by: ADVANCED PRACTICE MIDWIFE

## 2018-12-28 PROCEDURE — 96360 HYDRATION IV INFUSION INIT: CPT

## 2018-12-28 PROCEDURE — 25000128 H RX IP 250 OP 636: Performed by: ADVANCED PRACTICE MIDWIFE

## 2018-12-28 RX ADMIN — SODIUM CHLORIDE, POTASSIUM CHLORIDE, SODIUM LACTATE AND CALCIUM CHLORIDE 1000 ML: 600; 310; 30; 20 INJECTION, SOLUTION INTRAVENOUS at 10:20

## 2018-12-28 ASSESSMENT — PAIN SCALES - GENERAL: PAINLEVEL: NO PAIN (0)

## 2018-12-28 NOTE — LETTER
[facility]  [address] [place] [id number] [phone]  Dept: [phone]      To whom it may concern,    Rocio is continuing to see us weekly and getting infusions twice weekly for hyperemesis gravidarum. She is continuing medications for nausea. She is still not gaining weight, is down 2 # and is experiencing dizziness. She should limit work shifts to 4 hours twice next week and we can reevaluate the following week. Please contact us with any further questions or concerns.                  OMAR Maya, CNM

## 2018-12-28 NOTE — PROGRESS NOTES
Infusion Nursing Note:  Rocio Elkins presents today for IVF.    Patient seen by provider today: No   present during visit today: Not Applicable.    Note: pt states oral intake at home is still very poor. Currently receiving fluids 1x per week, and she says plan is to discharge from infusion in next couple weeks..    Intravenous Access:  Peripheral IV placed.    Treatment Conditions:  Not Applicable.      Post Infusion Assessment:  Patient tolerated infusion without incident.  Site patent and intact, free from redness, edema or discomfort.  No evidence of extravasations.  Access discontinued per protocol.    Discharge Plan:   Discharge instructions reviewed with: Patient.  Patient and/or family verbalized understanding of discharge instructions and all questions answered.  Patient discharged in stable condition accompanied by: self.  Departure Mode: Ambulatory.    Nelida Martin RN

## 2018-12-28 NOTE — PROGRESS NOTES
Feels  Better, today is a good day. Heading to infusion after this, 2# total loss.   Fetal movement: positive   Denies loss of fluid/vb/contractions  Anatomy ultrasound done by M having a Girl, Placenta:  Posterior previa-f/u scheduled with MFM in 4-6 weeks, reviewed pelvic rest again with patient  Continue weekly visits until she can return to work and stop infusions  Reviewed normal movement 18-22 weeks   Discussed switching to endocrine, she has been working on this but feels like no one is calling her back and her primary has also reached out, card given for [facility]    Vee Singh, OMAR, CNM

## 2019-01-03 ENCOUNTER — PRENATAL OFFICE VISIT (OUTPATIENT)
Dept: MIDWIFE SERVICES | Facility: CLINIC | Age: 34
End: 2019-01-03
Payer: COMMERCIAL

## 2019-01-03 VITALS — DIASTOLIC BLOOD PRESSURE: 62 MMHG | BODY MASS INDEX: 22.08 KG/M2 | SYSTOLIC BLOOD PRESSURE: 110 MMHG | WEIGHT: 141 LBS

## 2019-01-03 DIAGNOSIS — O09.512 ADVANCED MATERNAL AGE, PRIMIGRAVIDA IN SECOND TRIMESTER, ANTEPARTUM: Primary | ICD-10-CM

## 2019-01-03 DIAGNOSIS — O21.0 HYPEREMESIS GRAVIDARUM, ANTEPARTUM: ICD-10-CM

## 2019-01-03 DIAGNOSIS — O44.02 PLACENTA PREVIA WITHOUT HEMORRHAGE IN SECOND TRIMESTER: ICD-10-CM

## 2019-01-03 DIAGNOSIS — E89.0 POSTABLATIVE HYPOTHYROIDISM: ICD-10-CM

## 2019-01-03 DIAGNOSIS — Z3A.20 20 WEEKS GESTATION OF PREGNANCY: ICD-10-CM

## 2019-01-03 PROCEDURE — 99207 ZZC PRENATAL VISIT: CPT | Performed by: NURSE PRACTITIONER

## 2019-01-03 NOTE — PATIENT INSTRUCTIONS
Remedies for nausea and vomiting with pregnancy      Eat small frequent meals every 2-3 hours if possible.       Avoid food at extremes of temperature and drinks with carbonation.      Eat foods that appeal to you, avoiding fats and spicy foods.      Avoid liquids with foods.  Drink liquids 30-60 minutes before or after eating and sip slowly.      Bread, pasta, crackers, potatoes, and rice tend to be tolerated the best.      Don't worry about what you eat in the first 3 months, it is more important that you can eat and keep it down.       Try flat ginger ale or ginger tea      Before rising in the morning, eat a small amount of crackers or dry toast.      Peppermint tea      Ginger is a herbal remedy for nausea and you can use it in any form.  There are ginger tablets you can purchase.  The dose 1000 mg a day in divided doses.       You may also try doxylamine (Unisom) 12.5 mg three times a day which is a sleeping medication along with Vitamin B6 25 mg three times a day.  This combination takes up to a week to work so give it some time.       Benadryl (diphenhydramine) 25-50 mg every 8 hour or Dramamine (dimenhydrinate)  mg by mouth every 4-6 hours. Both of these medication may cause some drowsiness      Other things that may help include an Accupressure band and acupuncture      If these methods fail there are many prescriptions that we can try    If you begin to vomit more than 5 or 6 times a day and feel that you are unable to keep anything down, call the [facility] at [phone]  Round Ligament Pain    Pregnancy can entail many normal discomforts. One of those discomforts may be round ligament pain. Round ligament pain occurs as your uterus and your baby grow and the muscles begin to stretch more in your abdomen. Round ligament pain is normal and not dangerous but can be very uncomfortable      Round ligament pain is typically described as an unpleasant sensation that ranges from a sharp  knifelike pain to dull intermittent pain in the lower abdominal/suprapubic area of a pregnant woman      Virtually all pregnant women will experience this pain at some point during their pregnancies      It typically manifests between 16 and 20 weeks of pregnancy and can be incredibly bothersome especially for women who remain very active during their pregnancies       Please discuss with your midwife if you are having this type of pain; sometimes the pain can be associated with other medical conditions so it is important for us to assess you just to make sure    Comfort measures    There are certain things you can do to cope with round ligament pain and ease the discomfort you are having      Pregnancy support belt      Tylenol      Hot/ice packs      Baths       Exercise such as yoga and swimming that help stretch muscles      Prenatal massage      Reflexology to waist and pelvic joints       Positioning such as side-lying and hands and knees, make sure your abdomen is  well supported with pillows while doing different positions

## 2019-01-03 NOTE — PROGRESS NOTES
Feels like nausea is improving, but is still having episodes of nausea and vomiting every 2 days.  She states that she has returned to work and is currently working 4 hour shifts.  States that after working 4 hours, her nausea worsened, she had severe fatigue, and vomited 1 time.  She states that she is still not able to eat every day, has been eating small amounts when she can.  Has been tolerating water most days and is still requiring IV hydration 1x week.   She states that after IV hydration, nausea, vomiting, fatigue, and headaches will improve.    Fetal movement: positive   Denies loss of fluid/vb/contractions  Anatomy ultrasound done at [facility]; results discussed at previous visit.  Has f/u US at [facility] in 4-6 weeks  Has not been able to get into Endocrinologist; was told 1st appointment was in 3 months.  States that she does not have primary care provider to manage thyroid medicine since insurance changed in Jan.    Number for [facility] clinic given; encouraged to call to establish care for thyroid medicine management.   Letter given and faxed to employer for work restrictions; can only work 4 hour shifts 3 days/week for the next 3 weeks.    Will continue IV hydration and OB visits to monitor signs and symptoms   Round ligament pain and comfort measures reviewed  Return to clinic 1 week    Will need TSH drawn in 1-2 weeks    Faby NELSON CNM

## 2019-01-15 ENCOUNTER — TELEPHONE (OUTPATIENT)
Dept: MIDWIFE SERVICES | Facility: CLINIC | Age: 34
End: 2019-01-15

## 2019-01-15 NOTE — TELEPHONE ENCOUNTER
Started yesterday with back pain  Intermittent   Especially when she stands  Discussed with growing pregnancy effects on her body and encouraged Tylenol up to 1000mg every 6 hours, warm tub baths, heat to back and a pregnancy support belt.  The patient indicates understanding of these issues and agrees with the plan.

## 2019-01-18 ENCOUNTER — PRENATAL OFFICE VISIT (OUTPATIENT)
Dept: MIDWIFE SERVICES | Facility: CLINIC | Age: 34
End: 2019-01-18
Payer: COMMERCIAL

## 2019-01-18 VITALS — BODY MASS INDEX: 22.55 KG/M2 | WEIGHT: 144 LBS | SYSTOLIC BLOOD PRESSURE: 102 MMHG | DIASTOLIC BLOOD PRESSURE: 56 MMHG

## 2019-01-18 DIAGNOSIS — O21.0 HYPEREMESIS GRAVIDARUM, ANTEPARTUM: ICD-10-CM

## 2019-01-18 DIAGNOSIS — O09.92 SUPERVISION OF HIGH RISK PREGNANCY IN SECOND TRIMESTER: ICD-10-CM

## 2019-01-18 DIAGNOSIS — E89.0 POSTABLATIVE HYPOTHYROIDISM: ICD-10-CM

## 2019-01-18 PROCEDURE — 99207 ZZC PRENATAL VISIT: CPT | Performed by: ADVANCED PRACTICE MIDWIFE

## 2019-01-18 PROCEDURE — 84439 ASSAY OF FREE THYROXINE: CPT | Performed by: ADVANCED PRACTICE MIDWIFE

## 2019-01-18 PROCEDURE — 84443 ASSAY THYROID STIM HORMONE: CPT | Performed by: ADVANCED PRACTICE MIDWIFE

## 2019-01-18 PROCEDURE — 36415 COLL VENOUS BLD VENIPUNCTURE: CPT | Performed by: ADVANCED PRACTICE MIDWIFE

## 2019-01-18 NOTE — PROGRESS NOTES
"Feels better. \"Nausea is on/off, getting better.\" Still doesn't feel she has full strength. Eating is okay, forces herself to eat. No longer having to use IV hydration, because she started eating.   Hasn't been taking Zofran as of recently  Currently working 2 days a week, for 4 hours a day. Requesting letter to be faxed stating to work 3 days a week/4hours a day. Letter faxed to [phone]. Will evaluate work status again at upcoming visit    Has still not been seen by Endocrinology. Called Endocrinology was not able to get an appointment. She was told it would be a 2-3 month wait.  I placed a call myself to [facility].  stated the clinic has never received any referrals and usually referrals are kept on file for 3 months. I stated our clinic has ordered several referrals.  stated they are unable to see orders from [facility] and they usually receive referrals via fax.   Referal faxed. Asked  to call Dick to set up an appointment. Contact information provided and aware a message may be left, if needed. Call placed to Dick and she is aware to expect a call  from the Endo clinic to set up an appt. Number provided to Dick again, just in case she does not get a call.     TSH level drawn today    Having back pain. Has used Tylenol once. Discussed comfort measures.   Fetal movement: positive   Denies loss of fluid/vb/contractions    GCT visit between 24 weeks, handout provided, reminded of longer appointment  Round ligament pain and comfort measures reviewed    Return to clinic ~2 weeks  Has repeat US scheduled with [facility] on 2/7 for F/U (placenta previa)      Kendra NELSON CNM        "

## 2019-01-18 NOTE — PATIENT INSTRUCTIONS
GESTATIONAL DIABETES SCREENING    All pregnant women are screened at least once for diabetes as part of their prenatal care. A woman has gestational diabetes if she has high blood sugars for the first time during pregnancy.      Diabetes can harm your health and the health of your baby.  But if we find the diabetes early in pregnancy we will watch your health closely and prevent further problems.       We will check for gestational diabetes during your visit between 24-28 weeks visit. Please note you can not do this prior to 24 weeks of gestation.      Plan to spend about an hour at the clinic.  When you check in let us know that you will be having your diabetes screening that day.       We will give you a 50 gram glucose drink that you have 5 minutes to consume.  Exactly one hour later you will have draw blood from your arm to check your blood sugar level.      We will call you to let you know if your results are normal.  If the results are normal no more testing will be needed.  If your results are not normal we will discuss follow up testing with you.        You may eat prior to the testing but it is not recommended to eat or drink very sweet things such as pop, juice, candy or dessert type foods.  Eat a high protein, low carb meals prior to testing.    If you have any questions please call:    [facility]    [phone]

## 2019-01-18 NOTE — LETTER
January 18, 2019      Rocio Elkins  [address] [place] [address]   [facility] [address]        To Whom It May Concern:    Rocio Elkins was seen in our clinic. She may return to work with the following: limited to 4 hour workdays, 3 times a week, beginning the week of 1/21/19.      Sincerely,        OMAR Awan CNM

## 2019-01-19 LAB
T4 FREE SERPL-MCNC: 1.25 NG/DL (ref 0.76–1.46)
TSH SERPL DL<=0.005 MIU/L-ACNC: 0.02 MU/L (ref 0.4–4)

## 2019-01-23 DIAGNOSIS — Z36.9 ENCOUNTER FOR ANTENATAL SCREENING OF MOTHER: Primary | ICD-10-CM

## 2019-01-28 ENCOUNTER — TELEPHONE (OUTPATIENT)
Dept: MIDWIFE SERVICES | Facility: CLINIC | Age: 34
End: 2019-01-28

## 2019-01-28 ENCOUNTER — NURSE TRIAGE (OUTPATIENT)
Dept: NURSING | Facility: CLINIC | Age: 34
End: 2019-01-28

## 2019-01-29 ENCOUNTER — TELEPHONE (OUTPATIENT)
Dept: FAMILY MEDICINE | Facility: CLINIC | Age: 34
End: 2019-01-29

## 2019-01-29 DIAGNOSIS — E89.0 POSTABLATIVE HYPOTHYROIDISM: Primary | ICD-10-CM

## 2019-01-29 NOTE — TELEPHONE ENCOUNTER
Patient/parent is informed of MD note below, as it is written. Verbalized good understanding.  Will call back to schedule an appointment.  Lida White RN

## 2019-01-29 NOTE — TELEPHONE ENCOUNTER
Please notify pt that her thyroid hormone is in normal range and that her TSH is abnormal again but would not change her dose at this time.      She should check again in 2 months.  I have ordered the tests, please call to make a non-fasting lab appointment.

## 2019-01-29 NOTE — TELEPHONE ENCOUNTER
Clinic Action Needed: Yes  Reason for Call:    Patient has a cold with runny nose, headache, sneezing, congestion and feeling tired.     Patient spoke with her pharmacist who said in pregnancy she can take Zyrtec. She wanted to know from the clinic if she is okay to take the Zyrtec?     Advised patient the medication was not on the pre-approved medications for pregnancy per [facility] reference, also advised per [employer]:     Pregnancy Category  Cetirizine: Fetal risk cannot be ruled out. (TH)    Patient states she will take one dose tonight of the Zyrtec since she spoke with her pharmacist and requested a message sent to her OB Midwife group to call her tomorrow at [phone] and please advise if okay for patient to take Zyrtec.     Routed to:    Midwife group at [facility] in [facility] per patient request.

## 2019-01-29 NOTE — TELEPHONE ENCOUNTER
Patient has a cold with runny nose, headache, sneezing, congestion and feeling tired. She spoke with the pharmacist who said in pregnancy she can take Zyrtec. She wanted to know from the clinic if she is okay to take the Zyrtec. Advised patient the medication was not on the pre-approved medications for pregnancy per FNA reference, also advised per [employer]:     Pregnancy Category  Cetirizine: Fetal risk cannot be ruled out. (TH)    Patient states she will take one dose tonight of the Zyrtec since she spoke with her pharmacist and requested a message sent to her OB Midwife group to call her tomorrow at [phone] and advise if they felt okay having her take Zyrtec.     Protocol and care advice reviewed.  Message sent via [employer] to [facility] in [place] per patient request.   Caller states understanding of the recommended disposition.   Advised to call back if further questions or concerns.    Reason for Disposition    Caller has NON-URGENT medication question about med that PCP prescribed and triager unable to answer question    Protocols used: MEDICATION QUESTION CALL-ADULT-

## 2019-01-29 NOTE — TELEPHONE ENCOUNTER
Spoke with Dick, informed her of TSH/T4 results and that they appear to have been order by her FP MD so we never received the results to inform her. [facility], she is wondering if this will be covered by her insurance; I gave her the phone number and address of clinic and she will call and check with her insurance.     Also reviewed safe cold medications to take during pregnancy. Encouraged rest and hydration.    OMAR Maya, CNM

## 2019-02-01 ENCOUNTER — PRENATAL OFFICE VISIT (OUTPATIENT)
Dept: MIDWIFE SERVICES | Facility: CLINIC | Age: 34
End: 2019-02-01
Payer: COMMERCIAL

## 2019-02-01 VITALS — DIASTOLIC BLOOD PRESSURE: 70 MMHG | WEIGHT: 148 LBS | SYSTOLIC BLOOD PRESSURE: 104 MMHG | BODY MASS INDEX: 23.18 KG/M2

## 2019-02-01 DIAGNOSIS — O99.012 ANEMIA AFFECTING PREGNANCY IN SECOND TRIMESTER: ICD-10-CM

## 2019-02-01 DIAGNOSIS — Z3A.24 24 WEEKS GESTATION OF PREGNANCY: ICD-10-CM

## 2019-02-01 DIAGNOSIS — O09.92 SUPERVISION OF HIGH RISK PREGNANCY IN SECOND TRIMESTER: Primary | ICD-10-CM

## 2019-02-01 DIAGNOSIS — O09.92 SUPERVISION OF HIGH RISK PREGNANCY IN SECOND TRIMESTER: ICD-10-CM

## 2019-02-01 DIAGNOSIS — D69.6 THROMBOCYTOPENIA AFFECTING PREGNANCY, ANTEPARTUM (H): ICD-10-CM

## 2019-02-01 DIAGNOSIS — E89.0 POSTABLATIVE HYPOTHYROIDISM: ICD-10-CM

## 2019-02-01 DIAGNOSIS — Z36.9 ENCOUNTER FOR ANTENATAL SCREENING OF MOTHER: ICD-10-CM

## 2019-02-01 DIAGNOSIS — R73.09 IMPAIRED GLUCOSE TOLERANCE TEST: Primary | ICD-10-CM

## 2019-02-01 DIAGNOSIS — O44.02 PLACENTA PREVIA WITHOUT HEMORRHAGE IN SECOND TRIMESTER: ICD-10-CM

## 2019-02-01 DIAGNOSIS — O99.119 THROMBOCYTOPENIA AFFECTING PREGNANCY, ANTEPARTUM (H): ICD-10-CM

## 2019-02-01 DIAGNOSIS — E05.00 THYROTOXIC EXOPHTHALMOS: ICD-10-CM

## 2019-02-01 LAB
ERYTHROCYTE [DISTWIDTH] IN BLOOD BY AUTOMATED COUNT: 13.4 % (ref 10–15)
GLUCOSE 1H P 50 G GLC PO SERPL-MCNC: 150 MG/DL (ref 60–129)
HCT VFR BLD AUTO: 30.4 % (ref 35–47)
HGB BLD-MCNC: 10.1 G/DL (ref 11.7–15.7)
MCH RBC QN AUTO: 29.9 PG (ref 26.5–33)
MCHC RBC AUTO-ENTMCNC: 33.2 G/DL (ref 31.5–36.5)
MCV RBC AUTO: 90 FL (ref 78–100)
PLATELET # BLD AUTO: 144 10E9/L (ref 150–450)
RBC # BLD AUTO: 3.38 10E12/L (ref 3.8–5.2)
WBC # BLD AUTO: 9.4 10E9/L (ref 4–11)

## 2019-02-01 PROCEDURE — 36415 COLL VENOUS BLD VENIPUNCTURE: CPT | Performed by: ADVANCED PRACTICE MIDWIFE

## 2019-02-01 PROCEDURE — 85027 COMPLETE CBC AUTOMATED: CPT | Performed by: ADVANCED PRACTICE MIDWIFE

## 2019-02-01 PROCEDURE — 99207 ZZC PRENATAL VISIT: CPT | Performed by: ADVANCED PRACTICE MIDWIFE

## 2019-02-01 PROCEDURE — 82950 GLUCOSE TEST: CPT | Performed by: ADVANCED PRACTICE MIDWIFE

## 2019-02-01 RX ORDER — LANOLIN ALCOHOL/MO/W.PET/CERES
1000 CREAM (GRAM) TOPICAL DAILY
Qty: 90 TABLET | Refills: 3 | Status: ON HOLD | OUTPATIENT
Start: 2019-02-01 | End: 2019-05-31

## 2019-02-01 RX ORDER — FERROUS GLUCONATE 324(38)MG
324 TABLET ORAL
Qty: 30 TABLET | Refills: 3 | Status: ON HOLD | OUTPATIENT
Start: 2019-02-01 | End: 2019-05-31

## 2019-02-01 RX ORDER — FERROUS GLUCONATE 324(38)MG
324 TABLET ORAL
Qty: 30 TABLET | Refills: 3 | Status: SHIPPED | OUTPATIENT
Start: 2019-02-01 | End: 2019-02-01

## 2019-02-01 RX ORDER — MULTIVIT WITH MINERALS/LUTEIN
250 TABLET ORAL DAILY
Qty: 90 TABLET | Refills: 3 | Status: SHIPPED | OUTPATIENT
Start: 2019-02-01 | End: 2019-02-01

## 2019-02-01 RX ORDER — MULTIVIT WITH MINERALS/LUTEIN
250 TABLET ORAL DAILY
Qty: 90 TABLET | Refills: 3 | Status: ON HOLD | OUTPATIENT
Start: 2019-02-01 | End: 2019-05-31

## 2019-02-01 RX ORDER — LANOLIN ALCOHOL/MO/W.PET/CERES
1000 CREAM (GRAM) TOPICAL DAILY
Qty: 90 TABLET | Refills: 3 | Status: SHIPPED | OUTPATIENT
Start: 2019-02-01 | End: 2019-02-01

## 2019-02-01 NOTE — PROGRESS NOTES
Feels well overall. Been sick with a cold/flu and just starting to feel better.   Fetal movement: positive   Denies loss of fluid/vb/contractions  GCT done today  Tdap next visit; reviewed CDC recommendations and partner/family vaccination recommended as well  MFM ultrasound scheduled for 2/7/19  Endocrinology appointment scheduled for Monday 2/4/19.  Need for Rhogam? No, O+      Return to clinic 4 weeks    Jevon Montero DNP, OMAR, CNM    Lab results showed anemia (10.1) and thrombocytopenia (144).  I sent in an rx for iron, vit c, vit b12.   Called Dick and left a VM. Informed her of her lab results and the medications that she should take along with instructions for usage. Informed her I sent them to the pharmacy, but they can be purchased OTC. Told her to call if she had questions. Also sent a letter with instructions for usage to her house.     Plan to redraw labs in 4 weeks.     Jevon Montero DNP, OMAR, CNM

## 2019-02-01 NOTE — LETTER
Naima Jennings,    I received your lab results back. It shows that your platelets and hemoglobin are slightly low. In the second trimester I would really like that to be at or above 10.5. I suggest taking an iron supplement to increase your iron concentration. There are many iron versions you can take, but I recommend ferrous gluconate since it is the least likely to cause constipation. In addition I recommend taking a few other vitamins to aid in the iron absorption process (these can be taken at the same time). Everything can be purchased over-the-counter, but I did send a prescription to the [facility]. We will redraw the labs in about 4 weeks. Please call me if you have any questions or concerns.     1) Ferrous gluconate 325 mg per day  2) Vitamin C 250 mg per day  3) Vitamin B12 1000 mcg per day  4) Your normal prenatal vitamin and folic acid per day    Do not take iron with meals, coffee, dairy, tea, or carbonated beverages.      Thank you!    Jevon Montero, DNP, APRN, CNM

## 2019-02-05 ENCOUNTER — TRANSFERRED RECORDS (OUTPATIENT)
Dept: HEALTH INFORMATION MANAGEMENT | Facility: CLINIC | Age: 34
End: 2019-02-05

## 2019-02-07 ENCOUNTER — OFFICE VISIT (OUTPATIENT)
Dept: MATERNAL FETAL MEDICINE | Facility: CLINIC | Age: 34
End: 2019-02-07
Attending: OBSTETRICS & GYNECOLOGY
Payer: COMMERCIAL

## 2019-02-07 ENCOUNTER — HOSPITAL ENCOUNTER (OUTPATIENT)
Dept: ULTRASOUND IMAGING | Facility: CLINIC | Age: 34
Discharge: HOME | End: 2019-02-07
Attending: OBSTETRICS & GYNECOLOGY | Admitting: OBSTETRICS & GYNECOLOGY
Payer: COMMERCIAL

## 2019-02-07 DIAGNOSIS — E07.9 THYROID DISEASE DURING PREGNANCY IN SECOND TRIMESTER: Primary | ICD-10-CM

## 2019-02-07 DIAGNOSIS — O99.282 THYROID DISEASE DURING PREGNANCY IN SECOND TRIMESTER: Primary | ICD-10-CM

## 2019-02-07 DIAGNOSIS — O21.0 HYPEREMESIS AFFECTING PREGNANCY, ANTEPARTUM: ICD-10-CM

## 2019-02-07 PROBLEM — O44.40 LOW-LYING PLACENTA: Status: ACTIVE | Noted: 2018-12-27

## 2019-02-07 PROCEDURE — 76816 OB US FOLLOW-UP PER FETUS: CPT

## 2019-02-07 NOTE — PROGRESS NOTES
"Please see \"Imaging\" tab under \"Chart Review\" for details of today's visit.    Floyd Smith    "

## 2019-02-08 DIAGNOSIS — R73.09 IMPAIRED GLUCOSE TOLERANCE TEST: ICD-10-CM

## 2019-02-08 PROCEDURE — 36415 COLL VENOUS BLD VENIPUNCTURE: CPT | Performed by: ADVANCED PRACTICE MIDWIFE

## 2019-02-08 PROCEDURE — 82952 GTT-ADDED SAMPLES: CPT | Performed by: ADVANCED PRACTICE MIDWIFE

## 2019-02-08 PROCEDURE — 82951 GLUCOSE TOLERANCE TEST (GTT): CPT | Performed by: ADVANCED PRACTICE MIDWIFE

## 2019-02-09 ENCOUNTER — TELEPHONE (OUTPATIENT)
Dept: MIDWIFE SERVICES | Facility: CLINIC | Age: 34
End: 2019-02-09

## 2019-02-09 DIAGNOSIS — O24.419 GESTATIONAL DIABETES MELLITUS (GDM) IN SECOND TRIMESTER, GESTATIONAL DIABETES METHOD OF CONTROL UNSPECIFIED: Primary | ICD-10-CM

## 2019-02-09 LAB
GLUCOSE 1H P 100 G GLC PO SERPL-MCNC: 182 MG/DL (ref 60–179)
GLUCOSE 2H P 100 G GLC PO SERPL-MCNC: 153 MG/DL (ref 60–154)
GLUCOSE 3H P 100 G GLC PO SERPL-MCNC: 140 MG/DL (ref 60–139)
GLUCOSE P FAST SERPL-MCNC: 88 MG/DL (ref 60–94)

## 2019-02-09 NOTE — RESULT ENCOUNTER NOTE
I spoke with Rocio via telephone call and communicated these results.  See telephone encounter. Rocio voiced understanding and denied further questions or concerns.    Rachel Sky, DNP, APRN, CNM

## 2019-02-09 NOTE — LETTER
2019      Rocio Elkins  [address]   [facility] [address]        To Whom It May Concern,     Rocio Elkins is currently pregnant with her first child and she is being cared for at our clinic in [facility].  Her due date is May 23, 2019.  The father of the baby is Daryl Ramos, to whom Rocio is .  Rocio's mother-in-law, Jenifer Myers, will be needed in [place], [place] prior to, during and after Rocio's childbirth.  Renajerome's presence is necessary to help provide care for Rocio, the  child, and the household.        If you have questions or concerns, please call the clinic at the number listed above.    Sincerely,               OMAR Madden CNM

## 2019-02-09 NOTE — TELEPHONE ENCOUNTER
Called Dick to discuss 3hr GTT results.  Discussed that two of the four levels were above target range, which is diagnostic of gestational diabetes. Explained what gestational diabetes is and associated risks of uncontrolled blood sugars. Discussed plan of care of gestational diabetes, including referral to Diabetes Education, blood sugar checks, and diet. Answered questions.  Referral for Diabetes Education placed; encouraged Dick to make an appointment when schedulers call her next week.     Dick requested that the previously-written [employer] letter for her mother-in-law be emailed to her personal account.  New copy made with current date emailed to Dick's account.      Rachel Sky, DNP, APRN, CNM

## 2019-02-18 ENCOUNTER — ALLIED HEALTH/NURSE VISIT (OUTPATIENT)
Dept: EDUCATION SERVICES | Facility: CLINIC | Age: 34
End: 2019-02-18
Payer: COMMERCIAL

## 2019-02-18 DIAGNOSIS — O24.419 GESTATIONAL DIABETES MELLITUS (GDM) IN SECOND TRIMESTER, GESTATIONAL DIABETES METHOD OF CONTROL UNSPECIFIED: Primary | ICD-10-CM

## 2019-02-18 PROCEDURE — G0108 DIAB MANAGE TRN  PER INDIV: HCPCS

## 2019-02-18 RX ORDER — LANCETS
EACH MISCELLANEOUS
Qty: 102 EACH | Refills: 1 | Status: ON HOLD | OUTPATIENT
Start: 2019-02-18 | End: 2019-05-31

## 2019-02-18 RX ORDER — BLOOD-GLUCOSE METER
1 EACH MISCELLANEOUS ONCE
Qty: 1 KIT | Refills: 0 | Status: ON HOLD | COMMUNITY
Start: 2019-02-18 | End: 2019-05-31

## 2019-02-18 NOTE — PROGRESS NOTES
Diabetes Self-Management Education & Support    Gestational Diabetes Self-Management Education & Support    SUBJECTIVE/OBJECTIVE:  Presents for education related to gestational diabetes.    Accompanied by: Self  Diabetes management related comments/concerns: none     Cultural Influences/Ethnic Background:  [place] citizen    Estimated Date of Delivery: May 23, 2019    1 hour OGTT  Lab Results   Component Value Date    GLU1 150 (H) 2019       3 hour OGTT    Fasting  Lab Results   Component Value Date    GLF 88 2019       1 hour  Lab Results   Component Value Date    GL1 182 (H) 2019       2 hour  Lab Results   Component Value Date    GL2 153 2019       3 hour  Lab Results   Component Value Date    GL3 140 (H) 2019       Lifestyle and Health Behaviors:  Pre-pregnancy weight (lbs): 142(weight gain of 6# at 26 weeks)  Meals include: Breakfast, Lunch, Dinner, Snacks  Beverages: Water, Milk, Juice  Cultural/Yazidism diet restrictions?: No  Biggest challenges to healthy eating: None  Pre-jaden vitamin?: Yes  Supplements?: Yes(B6 and B12 and iron)  Experiencing nausea?: No(did have nausea earlier in her pregnancy but this is now resolved)    Breakfast: cereal (no sugar) with milk OR bagel OR anjeero  Snack: none  Lunch: anjeero  Snack: homemade juice (avocado, strawberry, banana, blueberry)  Dinner: rice with vegetable and milk and meat  Snack: nothing    Healthy Coping:  Emotional response to diabetes: Ready to learn  Informal Support system:: Family, Spouse  Stage of change: PREPARATION (Decided to change - considering how)    Current Management:  Taking medications for gestational diabetes?: No  Difficulty affording diabetes management supplies?: No    ASSESSMENT:  Pt needs assistance with BG monitoring and meal plan.     INTERVENTION:  Patient was instructed on Accu-Chek Guide meter and was able to provide an accurate return demonstration. Patient's blood glucose reading today was 109  mg/dL.    Educational topics covered today:  GDM diagnosis, pathophysiology, Risks and Complications of GDM, Means of controlling GDM, Using a Blood Glucose Monitor, Blood Glucose Goals, Logging and Interpreting Glucose Results, Ketone Testing, When to Call a Diabetes Educator or OB Provider, Healthy Eating During Pregnancy, Counting Carbohydrates, Meal Planning for GDM, and Physical Activity    Educational materials provided today:   Dulce Understanding Gestational Diabetes  GDM Log Book  Sharps Disposal  Care After Delivery  Accu-Chek Guide meter kit    Pt verbalized understanding of concepts discussed and recommendations provided today.     PLAN:  Check glucose 4 times daily, before breakfast and 1 hour after each meal.     Check Ketones daily for one week, if negative, reduce testing to once a week.     Physical activity recommended: as able.    Meal plan: 2-3 carbs at breakfast, 3-4 carbs at lunch, 3-4 carbs at supper, 1-2 carbs at 3 snacks a day.  Follow consistent CHO meal plan, eat CHO and protein/fat at all meals/snacks.    Call/e-mail/MyChart message diabetes educator if 3 or more blood sugars are above the goal in 1 week, if ketones are positive, or with questions/concerns.    ROMARIO Sultana CDE    Time Spent: 60 minutes  Encounter Type: Individual    Any diabetes medication dose changes were made via the CDE Protocol and Collaborative Practice Agreement with the patient's OB/GYN provider. A copy of this encounter was shared with the provider.

## 2019-02-20 ENCOUNTER — TELEPHONE (OUTPATIENT)
Dept: MIDWIFE SERVICES | Facility: CLINIC | Age: 34
End: 2019-02-20

## 2019-02-20 NOTE — LETTER
February 20, 2019        Rocio Elkins  [address] [place] [address]   [facility] [address]          To whom it may concern:    RE: Rocio Elkins    Patient may return to work Monday, February 25th, 2019 with the following:  No working or lifting restrictions.    Please contact me for questions or concerns.      Sincerely,      Jevon Montero, DNP, APRN, CNM

## 2019-02-20 NOTE — TELEPHONE ENCOUNTER
Pt calling at 26w6d  Pt feeling better and is planning to return to work Monday, 2/25/19 full time without restrictions. Needs a return to work letter faxed to employer.    Letter written and signed by DEVIN Montero CNM  Faxed letter as requested by pt to:  Fax [phone]

## 2019-02-21 NOTE — TELEPHONE ENCOUNTER
Patient called stating employer did not receive fax. Received correct fax number of [phone] and resent letter.

## 2019-03-01 ENCOUNTER — ALLIED HEALTH/NURSE VISIT (OUTPATIENT)
Dept: EDUCATION SERVICES | Facility: CLINIC | Age: 34
End: 2019-03-01
Payer: COMMERCIAL

## 2019-03-01 DIAGNOSIS — O24.419 GESTATIONAL DIABETES MELLITUS (GDM) IN SECOND TRIMESTER, GESTATIONAL DIABETES METHOD OF CONTROL UNSPECIFIED: Primary | ICD-10-CM

## 2019-03-01 PROCEDURE — G0108 DIAB MANAGE TRN  PER INDIV: HCPCS

## 2019-03-01 NOTE — PROGRESS NOTES
Diabetes Self-Management Education & Support  Gestational Diabetes Self-Management Education & Support    SUBJECTIVE/OBJECTIVE:  Presents for education related to gestational diabetes.    Accompanied by: Self  Diabetes management related comments/concerns: none     Cultural Influences/Ethnic Background:  [place]    LMP 2018 (Exact Date)     Weight gain 6 lbs at 27 weeks gestation.    Estimated Date of Delivery: May 23, 2019    Blood Glucose/Ketone Log:         Test her after lunch number in our visit and she was 110    Lifestyle and Health Behaviors:  Pre-pregnancy weight (lbs): 142  Meals include: Breakfast, Lunch, Dinner, Snacks  Beverages: Water, Milk, Juice  Cultural/Yazdanism diet restrictions?: No  Biggest challenges to healthy eating: None  Pre- vitamin?: Yes  Supplements?: Yes(B6 and B12 and iron)  Experiencing nausea?: Yes(has resumed per pt)    Breakfast (9 am): egg with slice of bread  Snack: carrots  Lunch (1-2 pm): macaroni with sauce and fish OR soup with chicken and wild rice and chips and dip  Snack: salad  Dinner (8:30 pm): homemade juice with berries and avocado and spinach (2 glasses)  HS snack: nothing    Healthy Coping:  Emotional response to diabetes: Ready to learn, Concern for health and well-being  Informal Support system:: Family, Spouse  Stage of change: ACTION (Actively working towards change)    Current Management:  Taking medications for gestational diabetes?: No  Difficulty affording diabetes management supplies?: No    ASSESSMENT:  Ketones: trace.   Fasting blood glucoses: 91% in target.  After breakfast: 81% in target.  After lunch: 81% in target.  After dinner: 80% in target.    Pt reports experimenting with some foods in the beginning which is why she had some higher numbers. This week they have balanced out more and are all in target. Continues to have ketones though which is likely r/t not having carbs at her snacks and not having a bedtime snack. Focused on  importance of consuming at least the minimum number of carbs at each snack and some sample ideas for her snacks. Also, reiterated importance of the bedtime snack. Since she sometimes eats dinner late and then goes to bed after, discussed that on those days she could do lunch, snack, snack, dinner.     INTERVENTION:  Educational topics covered today:  What to expect after delivery, Future testing for Type 2 diabetes (2 hour OGTT at 6 week post-partum check-up and annual fasting blood glucose level), Risk of GDM and planning ahead for future pregnancies, Recommended lifestyle interventions for reducing the risk of Type 2 Diabetes, When to Call a Diabetes Educator or OB Provider    Reiterated the meal plan today and some different ideas for meals and snacks to follow the meal plan more closely.     Offered to teach her insulin today just in case she needs it during her pregnancy but she prefers to wait until if she needs it.     Educational Materials provided today:  Dulce Preventing Diabetes    PLAN:  Check glucose 4 times daily.  E-mail us in 1 week for follow up.  Check ketones once a week when readings are consistently negative.  Continue with recommended physical activity.  Continue to follow recommended meal plan: 2-3 carbs at breakfast, 3-4 carbs at lunch, 3-4 carbs at supper, 1-2 carbs at snacks.  Follow consistent CHO meal plan, eat CHO and protein/fat at all meals/snacks.    Call/e-mail/MyChart message diabetes educator if 3 or more blood sugars are above the goal in 1 week or if ketones are positive.    ROMARIO Sultana CDE    Time Spent: 30 minutes  Encounter Type: Individual    Any diabetes medication dose changes were made via the CDE Protocol and Collaborative Practice Agreement with the patient's OB/GYN provider. A copy of this encounter was shared with the provider.

## 2019-03-01 NOTE — PATIENT INSTRUCTIONS
1. Check glucose 4 times daily, before breakfast daily and 1 hour after each meal, as recommended.    2. Check ketones daily or once a week after they have been negative for 7 days in a row. If ketones are positive, let your diabetes educator know and continue to check daily until they are negative for 7 days in a row.    3. Continue with recommended physical activity.    4. Continue to follow recommended meal plan: 2-3 carbs at breakfast, 3-4 carbs at lunch, 3-4 carbs at supper, 1-2 carbs at snacks.  Follow consistent CHO meal plan, eat CHO and protein/fat at all meals/snacks.  **For breakfast, check how many carbs are in your bread and if it is close to 15 grams then do 2 slices OR you can do 1 slice with 8 oz of milk.   **Try to include a carbohydrate with all snacks (1-2 pieces of bread OR 1 c of fruit OR 1 Greek yogurt)  ** If lunch is at 1 pm or so, then try to have a snack around 3 pm and again around 6 pm (assuming dinner is around 8:30 pm).   ** Make sure to include some protein at dinner and at bedtime snack (if dinner is earlier in the evening).   ** For bedtime snack, can do 2 cups of milk and some nuts.     5. Follow-up with OB doctor as recommended.    6. Call/e-mail diabetes educator if 3 or more blood sugars are above the goal in 1 week or if ketones are positive.     **E-mail us for follow up in 1 week.       AFTER YOU DELIVER:  - Continue with healthy eating and physical activity to get back to your pre-pregnancy weight.   - Have a follow-up 2-hour Glucose Tolerance Test at your 6-week post-partum check-up.   - Have your fasting blood sugar checked once a year.  - Plan ahead for future pregnancies - eat healthy, keep active, work with your doctor to check for gestational diabetes early on in the pregnancy and check blood sugars as recommended by your doctor.

## 2019-03-11 ENCOUNTER — PATIENT OUTREACH (OUTPATIENT)
Dept: EDUCATION SERVICES | Facility: CLINIC | Age: 34
End: 2019-03-11

## 2019-03-11 NOTE — PROGRESS NOTES
Gestational Diabetes Follow-up    Subjective/Objective:    Rocio Elkins sent in blood glucose log for review. Last date of communication was: 3/1/19.    Gestational diabetes is being managed with diet and activity    Taking diabetes medications: no    Estimated Date of Delivery: May 23, 2019    BG/Food Log:       Assessment:    Ketones: n/a.   Fasting blood glucoses: 100% in target.  After breakfast: 85% in target.  After lunch: 100% in target.  After dinner: 100% in target.    Plan/Response:  No changes in the patient's current treatment plan.  Follow-up in 1 week.    Asim Jennings,    Thanks for the email! Your blood sugars continue to look great, so let s plan on having you email us again next Monday for another review.    Have a great week!  Aleyda Gilliland RD LD CDE    Any diabetes medication dose changes were made via the CDE Protocol and Collaborative Practice Agreement with the patient's OB/GYN provider. A copy of this encounter was shared with the provider.

## 2019-03-14 ENCOUNTER — PRENATAL OFFICE VISIT (OUTPATIENT)
Dept: MIDWIFE SERVICES | Facility: CLINIC | Age: 34
End: 2019-03-14
Payer: COMMERCIAL

## 2019-03-14 VITALS — BODY MASS INDEX: 23.18 KG/M2 | DIASTOLIC BLOOD PRESSURE: 56 MMHG | WEIGHT: 148 LBS | SYSTOLIC BLOOD PRESSURE: 116 MMHG

## 2019-03-14 DIAGNOSIS — D69.6 THROMBOCYTOPENIA AFFECTING PREGNANCY, ANTEPARTUM (H): ICD-10-CM

## 2019-03-14 DIAGNOSIS — O09.93 SUPERVISION OF HIGH RISK PREGNANCY IN THIRD TRIMESTER: Primary | ICD-10-CM

## 2019-03-14 DIAGNOSIS — O99.119 THROMBOCYTOPENIA AFFECTING PREGNANCY, ANTEPARTUM (H): ICD-10-CM

## 2019-03-14 DIAGNOSIS — O21.0 HYPEREMESIS GRAVIDARUM, ANTEPARTUM: ICD-10-CM

## 2019-03-14 DIAGNOSIS — O44.40 LOW-LYING PLACENTA: ICD-10-CM

## 2019-03-14 DIAGNOSIS — O99.013 ANEMIA AFFECTING PREGNANCY IN THIRD TRIMESTER: ICD-10-CM

## 2019-03-14 DIAGNOSIS — E89.0 POSTABLATIVE HYPOTHYROIDISM: ICD-10-CM

## 2019-03-14 DIAGNOSIS — Z3A.30 30 WEEKS GESTATION OF PREGNANCY: ICD-10-CM

## 2019-03-14 DIAGNOSIS — O99.012 ANEMIA AFFECTING PREGNANCY IN SECOND TRIMESTER: ICD-10-CM

## 2019-03-14 PROCEDURE — 36415 COLL VENOUS BLD VENIPUNCTURE: CPT | Performed by: FAMILY MEDICINE

## 2019-03-14 PROCEDURE — 84443 ASSAY THYROID STIM HORMONE: CPT | Performed by: FAMILY MEDICINE

## 2019-03-14 PROCEDURE — 99207 ZZC PRENATAL VISIT: CPT | Performed by: ADVANCED PRACTICE MIDWIFE

## 2019-03-14 PROCEDURE — 84439 ASSAY OF FREE THYROXINE: CPT | Performed by: FAMILY MEDICINE

## 2019-03-14 NOTE — PATIENT INSTRUCTIONS
Fetal Kick Counts    It is important to know when your baby's movements occur. We often get busy with work and life and do not pay close attention to their movements.        Women typically begin feeling movement between 18-22 weeks of gestation, sometimes it can be earlier or later depending on where your placenta is       Movements usually begin feeling like popping or fluttering and as the baby grows they become more pronounced    Toward the end of pregnancy as the baby gets larger they may not move as much or make as big of movements. Babies have maturing sleep cycles as well as not as much room to move and flip. If you are ever concerned about your baby's movements or have not felt the baby move for a while, we recommend you do a fetal kick count. Prior to starting your count drink a glass of water or juice and eat a snack. Then lay down on your side and begin to count movements.     How to do a Fetal Kick Counts    There are many different ways to monitor your baby's movements. Movements can range from large jabs to small kicks, or wiggles.  Hiccups count!      Count 10 movements in 2 hours when resting and focusing    Count 10 movements in 12 hours when doing normal activity    We recommend that if movements occur but seem decreased that you should be seen in the clinic or hospital for evaluation within 12 hours. If fetal movement is absent or fetal kick counts are low please contact us right away.    If you ever have any concerns about your baby's movements DO NOT HESITATE to call us, we are here for you!    [facility]  [phone]        PREECLAMPSIA SIGNS AND SYMPTOMS    Preeclampsia is a dangerous condition that some women develop in the second half of pregnancy. It can also begin after the baby is born.  Preeclampsia causes high blood pressure and can cause problems with many organ systems in your body.  It can also affect the growth of your baby. The exact cause of preeclampsia is  "unknown, however, there are signs and symptoms to watch for:    -A bad headache that doesn't improve with Tylenol  -Visual changes such as spots, flashes of light, blurry vision  -Pain in the upper right part of your abdomen, especially under the ribs that doesn't go away  -Nausea and/or vomiting  -Feeling extremely tired  -Yellowing of the skin and/or eyes  -Feeling \"not quite right\" or that something is wrong  -An extreme amount of swelling (some swelling in pregnancy is very normal)    If your midwife feels that you are developing preeclampsia, you will have lab tests drawn and will be monitored very closely.     If you are experiencing anyof these symptoms, call the [facility] for Women immediately at [phone].    SIGNS OF  LABOR    Labor is  if it happens more than three weeks before your due date.    It can be hard to know if you are in labor, since the symptoms can be like the normal feelings of pregnancy.  Often, the only difference is the symptoms increase or they don't go away.     Signs of  labor can include:      Contractions which can feel like period cramps or gas pain.  You may feel it in the lower part of your abdomen, in your back, or as a pressure feeling in your bottom.  It is often regular, coming every 5 or 10 minutes, and  lasting about 30-60 seconds. Some contractions are normal during pregnancy ([place] mcknight contractions) but if you are feeling more than 5-6 in one hour that is NOT normal    If this occurs empty your bladder, then drink 2-3 glasses of water, eat a snack, and lay down on your left side. Put your hand on your abdomen to count the contractions.  If after one hour of resting you have still had 5-6 contractions call your clinic right away.      If you feel a pop, gush, or trickle of fluid it may mean that your bag of water has broken and you should contact the clinic       You may also experience loose stools and/or rectal pressure       Listen to " your body, if something doesn't seem right please call us at the clinic    Risk Factors      Previous  delivery    Bacterial Vaginosis- if you notice a fishy smell to your discharge or experience vaginal itching/discomfort you should be evaluated for infection    Smoking    Drug abuse    Adolescent (teen) pregnancy or advanced maternal age (AMA) age 35 and over    Dehydration (this may not cause  labor but it can cause contractions)    If you think you are in  labor we may do some lab testing in the clinic or send you to the hospital for evaluation    Please call us if you are concerned you are in  labor.    [facility]  [phone]

## 2019-03-14 NOTE — PROGRESS NOTES
Feels well, here alone today.  Back to work full time and feels good.  GDM log reviewed, blood sugars have been mostly within target and Dick has been following-up with [facility] regularly.  Fetal Movement: positive, denies loss of fluid/vb, a little contractions  Fetal kick counts reviewed and handout given  Reviewed PTL precautions and s/sx of preeclampsia; denies any S&S and aware of what to report  Growth U/S ordered: Having a follow-up ultrasound at [facility] on 3/25/19  Hgb and iron studies next visit; orders futured.   TSH drawn today. Plan to fax results to Endocrine.    Return to clinic in 2 weeks    Rachel Sky, MADELINE, APRN, CNM

## 2019-03-14 NOTE — LETTER
March 18, 2019    Rocio LEÓN [facility]  [address] [place] [address]   [facility] [id number]            Dear Rocio,    Your thyroid hormone level is in normal range even though your TSH is low.  I would not change your medication level.     Below is a copy of the results.  It was a pleasure to see you at your last appointment.    If you have any questions or concerns, please call myself or my nurse at [phone].    Sincerely,    Delia Mcdaniel MD / valentina    Results for orders placed or performed in visit on 03/14/19   **TSH with free T4 reflex FUTURE 2mo   Result Value Ref Range    TSH 0.14 (L) 0.40 - 4.00 mU/L   T4 free   Result Value Ref Range    T4 Free 1.13 0.76 - 1.46 ng/dL

## 2019-03-15 LAB
T4 FREE SERPL-MCNC: 1.13 NG/DL (ref 0.76–1.46)
TSH SERPL DL<=0.005 MIU/L-ACNC: 0.14 MU/L (ref 0.4–4)

## 2019-03-15 NOTE — RESULT ENCOUNTER NOTE
Can you please fax these (TSH and T4) results to [facility]. I do not know the fax number off hand. There is a note from yesterday with records from that clinic that has their phone number on it, if you need to call.     Thank you,     Kendra NELSON CNM

## 2019-03-19 ENCOUNTER — TELEPHONE (OUTPATIENT)
Dept: MIDWIFE SERVICES | Facility: CLINIC | Age: 34
End: 2019-03-19

## 2019-03-19 DIAGNOSIS — O09.90 HIGH-RISK PREGNANCY: Primary | ICD-10-CM

## 2019-03-19 DIAGNOSIS — O99.013 ANEMIA AFFECTING PREGNANCY IN THIRD TRIMESTER: ICD-10-CM

## 2019-03-19 DIAGNOSIS — D69.6 THROMBOCYTOPENIA AFFECTING PREGNANCY, ANTEPARTUM (H): ICD-10-CM

## 2019-03-19 DIAGNOSIS — E05.90 HYPERTHYROIDISM: ICD-10-CM

## 2019-03-19 DIAGNOSIS — O99.119 THROMBOCYTOPENIA AFFECTING PREGNANCY, ANTEPARTUM (H): ICD-10-CM

## 2019-03-19 LAB
ERYTHROCYTE [DISTWIDTH] IN BLOOD BY AUTOMATED COUNT: 14.8 % (ref 10–15)
HCT VFR BLD AUTO: 35.7 % (ref 35–47)
HGB BLD-MCNC: 11.4 G/DL (ref 11.7–15.7)
MCH RBC QN AUTO: 29.2 PG (ref 26.5–33)
MCHC RBC AUTO-ENTMCNC: 31.9 G/DL (ref 31.5–36.5)
MCV RBC AUTO: 91 FL (ref 78–100)
PLATELET # BLD AUTO: 138 10E9/L (ref 150–450)
RBC # BLD AUTO: 3.91 10E12/L (ref 3.8–5.2)
RETICS # AUTO: 99.6 10E9/L (ref 25–95)
RETICS/RBC NFR AUTO: 2.5 % (ref 0.5–2)
T4 SERPL-MCNC: 15.4 UG/DL (ref 4.5–13.9)
WBC # BLD AUTO: 12.3 10E9/L (ref 4–11)

## 2019-03-19 PROCEDURE — 85027 COMPLETE CBC AUTOMATED: CPT | Performed by: INTERNAL MEDICINE

## 2019-03-19 PROCEDURE — 82728 ASSAY OF FERRITIN: CPT | Performed by: INTERNAL MEDICINE

## 2019-03-19 PROCEDURE — 85045 AUTOMATED RETICULOCYTE COUNT: CPT | Performed by: INTERNAL MEDICINE

## 2019-03-19 PROCEDURE — 83550 IRON BINDING TEST: CPT | Performed by: INTERNAL MEDICINE

## 2019-03-19 PROCEDURE — 84436 ASSAY OF TOTAL THYROXINE: CPT | Performed by: INTERNAL MEDICINE

## 2019-03-19 PROCEDURE — 83540 ASSAY OF IRON: CPT | Performed by: INTERNAL MEDICINE

## 2019-03-19 PROCEDURE — 36415 COLL VENOUS BLD VENIPUNCTURE: CPT | Performed by: INTERNAL MEDICINE

## 2019-03-19 RX ORDER — PNV,CALCIUM 72/IRON/FOLIC ACID 27 MG-1 MG
1 TABLET ORAL DAILY
Qty: 90 TABLET | Refills: 1 | Status: SHIPPED | OUTPATIENT
Start: 2019-03-19 | End: 2020-03-17

## 2019-03-20 ENCOUNTER — PATIENT OUTREACH (OUTPATIENT)
Dept: EDUCATION SERVICES | Facility: CLINIC | Age: 34
End: 2019-03-20

## 2019-03-20 DIAGNOSIS — O24.419 GESTATIONAL DIABETES MELLITUS (GDM) IN SECOND TRIMESTER, GESTATIONAL DIABETES METHOD OF CONTROL UNSPECIFIED: Primary | ICD-10-CM

## 2019-03-20 LAB
FERRITIN SERPL-MCNC: 20 NG/ML (ref 12–150)
IRON SATN MFR SERPL: 14 % (ref 15–46)
IRON SERPL-MCNC: 64 UG/DL (ref 35–180)
TIBC SERPL-MCNC: 456 UG/DL (ref 240–430)

## 2019-03-20 NOTE — RESULT ENCOUNTER NOTE
I spoke with Rocio and informed her of results via voicemail, patient to call back with question or concerns.    Vee Singh

## 2019-03-20 NOTE — PROGRESS NOTES
Gestational Diabetes Follow-up    Subjective/Objective:    Rocio MAU Chongcliffodr sent in blood glucose log for review. Last date of communication was: 3/11/19.    Gestational diabetes is being managed with diet and activity    Taking diabetes medications: no    Estimated Date of Delivery: May 23, 2019    BG/Food Log:       Assessment:    Ketones: n/a.   Fasting blood glucoses: 83% in target.  After breakfast: 50% in target.  After lunch: 67% in target.  After dinner: 67% in target.    Post meal numbers are starting to rise, although are still variable (sometimes as low as 113 after meals).     Plan/Response:  Keep a food record for the next follow-up.  Follow-up on Monday.    Asim Jennings,    Thank for you the updates! I am noticing that some of your after meal numbers are starting to pop up over 140 at times - have you noticed any certain foods that may be giving you higher numbers after eating? Some people start to notice that certain foods may be more likely to give them a higher reading (ie white rice instead of brown rice, etc). I do want to keep a closer eye on these numbers for now, so I am wondering if you could send me a food record along with your blood sugar log sent in by Monday morning. That way, we can help you see if there are any suggestions we can make to help keep your after meal numbers closer to target. Also, if it is an option for you, sometimes going for a 10-15 minute walk after meals can help keep your numbers a little lower as well. Keep in mind though, gestational diabetes is a hormone driven condition, so sometimes your body just needs a little extra help to get your numbers in range by this stage of your pregnancy! Let me know if you have any questions, otherwise we ll look for a food record and blood sugars by Monday morning.    Thanks!  ROMARIO Brandt CDE    Any diabetes medication dose changes were made via the CDE Protocol and Collaborative Practice Agreement with the patient's OB/GYN  provider. A copy of this encounter was shared with the provider.      ADDENDUM:    Patient responded:    Yes I noticed that too and I think I knew now which food dose that?? so I will make sure to avoid that, and I promise to send you better records.     Dick

## 2019-03-25 ENCOUNTER — OFFICE VISIT (OUTPATIENT)
Dept: MATERNAL FETAL MEDICINE | Facility: CLINIC | Age: 34
End: 2019-03-25
Attending: OBSTETRICS & GYNECOLOGY
Payer: COMMERCIAL

## 2019-03-25 ENCOUNTER — HOSPITAL ENCOUNTER (OUTPATIENT)
Dept: ULTRASOUND IMAGING | Facility: CLINIC | Age: 34
Discharge: HOME | End: 2019-03-25
Attending: OBSTETRICS & GYNECOLOGY | Admitting: OBSTETRICS & GYNECOLOGY
Payer: COMMERCIAL

## 2019-03-25 DIAGNOSIS — O24.410 DIET CONTROLLED GESTATIONAL DIABETES MELLITUS (GDM) IN THIRD TRIMESTER: Primary | ICD-10-CM

## 2019-03-25 PROCEDURE — 76816 OB US FOLLOW-UP PER FETUS: CPT

## 2019-03-25 NOTE — PROGRESS NOTES
"Please see \"imaging\" tab under \"Chart Review\" for details of today's US at the [facility].    Kristian Mills MD  Maternal-Fetal Medicine    "

## 2019-03-28 ENCOUNTER — PRENATAL OFFICE VISIT (OUTPATIENT)
Dept: MIDWIFE SERVICES | Facility: CLINIC | Age: 34
End: 2019-03-28
Payer: COMMERCIAL

## 2019-03-28 VITALS
BODY MASS INDEX: 24.04 KG/M2 | WEIGHT: 149.6 LBS | SYSTOLIC BLOOD PRESSURE: 94 MMHG | HEIGHT: 66 IN | DIASTOLIC BLOOD PRESSURE: 48 MMHG

## 2019-03-28 DIAGNOSIS — O24.410 DIET CONTROLLED GESTATIONAL DIABETES MELLITUS (GDM) IN SECOND TRIMESTER: ICD-10-CM

## 2019-03-28 DIAGNOSIS — D69.6 THROMBOCYTOPENIA AFFECTING PREGNANCY, ANTEPARTUM (H): ICD-10-CM

## 2019-03-28 DIAGNOSIS — O99.119 THROMBOCYTOPENIA AFFECTING PREGNANCY, ANTEPARTUM (H): ICD-10-CM

## 2019-03-28 DIAGNOSIS — Z3A.32 32 WEEKS GESTATION OF PREGNANCY: ICD-10-CM

## 2019-03-28 DIAGNOSIS — O99.013 ANEMIA AFFECTING PREGNANCY IN THIRD TRIMESTER: ICD-10-CM

## 2019-03-28 DIAGNOSIS — O21.0 HYPEREMESIS GRAVIDARUM, ANTEPARTUM: ICD-10-CM

## 2019-03-28 DIAGNOSIS — O44.40 LOW-LYING PLACENTA: ICD-10-CM

## 2019-03-28 DIAGNOSIS — O09.93 SUPERVISION OF HIGH RISK PREGNANCY IN THIRD TRIMESTER: Primary | ICD-10-CM

## 2019-03-28 DIAGNOSIS — E89.0 POSTABLATIVE HYPOTHYROIDISM: ICD-10-CM

## 2019-03-28 PROCEDURE — 99207 ZZC PRENATAL VISIT: CPT | Performed by: ADVANCED PRACTICE MIDWIFE

## 2019-03-28 ASSESSMENT — MIFFLIN-ST. JEOR: SCORE: 1390.33

## 2019-03-28 NOTE — PATIENT INSTRUCTIONS
"PREECLAMPSIA SIGNS AND SYMPTOMS    Preeclampsia is a dangerous condition that some women develop in the second half of pregnancy. It can also begin after the baby is born.  Preeclampsia causes high blood pressure and can cause problems with many organ systems in your body.  It can also affect the growth of your baby. The exact cause of preeclampsia is unknown, however, there are signs and symptoms to watch for:    -A bad headache that doesn't improve with Tylenol  -Visual changes such as spots, flashes of light, blurry vision  -Pain in the upper right part of your abdomen, especially under the ribs that doesn't go away  -Nausea and/or vomiting  -Feeling extremely tired  -Yellowing of the skin and/or eyes  -Feeling \"not quite right\" or that something is wrong  -An extreme amount of swelling (some swelling in pregnancy is very normal)    If your midwife feels that you are developing preeclampsia, you will have lab tests drawn and will be monitored very closely.     If you are experiencing anyof these symptoms, call the [facility] for Women immediately at [phone].    SIGNS OF  LABOR    Labor is  if it happens more than three weeks before your due date.    It can be hard to know if you are in labor, since the symptoms can be like the normal feelings of pregnancy.  Often, the only difference is the symptoms increase or they don't go away.     Signs of  labor can include:      Contractions which can feel like period cramps or gas pain.  You may feel it in the lower part of your abdomen, in your back, or as a pressure feeling in your bottom.  It is often regular, coming every 5 or 10 minutes, and  lasting about 30-60 seconds. Some contractions are normal during pregnancy ([place]ai mcknight contractions) but if you are feeling more than 5-6 in one hour that is NOT normal    If this occurs empty your bladder, then drink 2-3 glasses of water, eat a snack, and lay down on your left side. Put your " hand on your abdomen to count the contractions.  If after one hour of resting you have still had 5-6 contractions call your clinic right away.      If you feel a pop, gush, or trickle of fluid it may mean that your bag of water has broken and you should contact the clinic       You may also experience loose stools and/or rectal pressure       Listen to your body, if something doesn't seem right please call us at the clinic    Risk Factors      Previous  delivery    Bacterial Vaginosis- if you notice a fishy smell to your discharge or experience vaginal itching/discomfort you should be evaluated for infection    Smoking    Drug abuse    Adolescent (teen) pregnancy or advanced maternal age (AMA) age 35 and over    Dehydration (this may not cause  labor but it can cause contractions)    If you think you are in  labor we may do some lab testing in the clinic or send you to the hospital for evaluation    Please call us if you are concerned you are in  labor.    [facility]  [phone]    Fetal Kick Counts    It is important to know when your baby's movements occur. We often get busy with work and life and do not pay close attention to their movements.        Women typically begin feeling movement between 18-22 weeks of gestation, sometimes it can be earlier or later depending on where your placenta is       Movements usually begin feeling like popping or fluttering and as the baby grows they become more pronounced    Toward the end of pregnancy as the baby gets larger they may not move as much or make as big of movements. Babies have maturing sleep cycles as well as not as much room to move and flip. If you are ever concerned about your baby's movements or have not felt the baby move for a while, we recommend you do a fetal kick count. Prior to starting your count drink a glass of water or juice and eat a snack. Then lay down on your side and begin to count movements.     How  to do a Fetal Kick Counts    There are many different ways to monitor your baby's movements. Movements can range from large jabs to small kicks, or wiggles.  Hiccups count!      Count 10 movements in 2 hours when resting and focusing    Count 10 movements in 12 hours when doing normal activity    We recommend that if movements occur but seem decreased that you should be seen in the clinic or hospital for evaluation within 12 hours. If fetal movement is absent or fetal kick counts are low please contact us right away.    If you ever have any concerns about your baby's movements DO NOT HESITATE to call us, we are here for you!    [facility] for [facility]  [phone]

## 2019-03-29 ENCOUNTER — TELEPHONE (OUTPATIENT)
Dept: MIDWIFE SERVICES | Facility: CLINIC | Age: 34
End: 2019-03-29

## 2019-03-29 NOTE — TELEPHONE ENCOUNTER
Pt calling to find out if he still has refills for her vit b12  Confirmed there are refills  Pt will call the pharm

## 2019-04-11 ENCOUNTER — PATIENT OUTREACH (OUTPATIENT)
Dept: EDUCATION SERVICES | Facility: CLINIC | Age: 34
End: 2019-04-11

## 2019-04-11 DIAGNOSIS — O24.410 DIET CONTROLLED GESTATIONAL DIABETES MELLITUS (GDM) IN THIRD TRIMESTER: Primary | ICD-10-CM

## 2019-04-11 NOTE — PROGRESS NOTES
Last CDE patient contact was 3/20/19, outreach email for blood sugar logbook review.    Asim Montiel,    It looks like it s been over 2 weeks since we reviewed your blood sugar logbook.  Could you send in a picture of your blood sugar readings so we can make sure things are going well?    Thanks and have a great day!        Christine Kim MS, RD, LD, CDE      4/15- Patient has not sent in BG log despite outreach, will alert OB care she is lost to follow up.   Akila Rivas RD, LD, CDE

## 2019-04-16 ENCOUNTER — PATIENT OUTREACH (OUTPATIENT)
Dept: EDUCATION SERVICES | Facility: CLINIC | Age: 34
End: 2019-04-16

## 2019-04-16 ENCOUNTER — PRENATAL OFFICE VISIT (OUTPATIENT)
Dept: MIDWIFE SERVICES | Facility: CLINIC | Age: 34
End: 2019-04-16
Payer: COMMERCIAL

## 2019-04-16 VITALS — WEIGHT: 149 LBS | BODY MASS INDEX: 24.05 KG/M2 | SYSTOLIC BLOOD PRESSURE: 90 MMHG | DIASTOLIC BLOOD PRESSURE: 60 MMHG

## 2019-04-16 DIAGNOSIS — Z3A.34 34 WEEKS GESTATION OF PREGNANCY: ICD-10-CM

## 2019-04-16 DIAGNOSIS — O99.119 THROMBOCYTOPENIA AFFECTING PREGNANCY, ANTEPARTUM (H): ICD-10-CM

## 2019-04-16 DIAGNOSIS — O24.410 DIET CONTROLLED GESTATIONAL DIABETES MELLITUS (GDM) IN THIRD TRIMESTER: Primary | ICD-10-CM

## 2019-04-16 DIAGNOSIS — O09.93 SUPERVISION OF HIGH RISK PREGNANCY IN THIRD TRIMESTER: ICD-10-CM

## 2019-04-16 DIAGNOSIS — D69.6 THROMBOCYTOPENIA AFFECTING PREGNANCY, ANTEPARTUM (H): ICD-10-CM

## 2019-04-16 DIAGNOSIS — O24.419 GESTATIONAL DIABETES MELLITUS (GDM) IN SECOND TRIMESTER: Primary | ICD-10-CM

## 2019-04-16 PROCEDURE — 99207 ZZC PRENATAL VISIT: CPT | Performed by: ADVANCED PRACTICE MIDWIFE

## 2019-04-16 NOTE — PATIENT INSTRUCTIONS
Fetal Kick Counts    It is important to know when your baby's movements occur. We often get busy with work and life and do not pay close attention to their movements.        Women typically begin feeling movement between 18-22 weeks of gestation, sometimes it can be earlier or later depending on where your placenta is       Movements usually begin feeling like popping or fluttering and as the baby grows they become more pronounced    Toward the end of pregnancy as the baby gets larger they may not move as much or make as big of movements. Babies have maturing sleep cycles as well as not as much room to move and flip. If you are ever concerned about your baby's movements or have not felt the baby move for a while, we recommend you do a fetal kick count. Prior to starting your count drink a glass of water or juice and eat a snack. Then lay down on your side and begin to count movements.     How to do a Fetal Kick Counts    There are many different ways to monitor your baby's movements. Movements can range from large jabs to small kicks, or wiggles.  Hiccups count!      Count 10 movements in 2 hours when resting and focusing    Count 10 movements in 12 hours when doing normal activity    We recommend that if movements occur but seem decreased that you should be seen in the clinic or hospital for evaluation within 12 hours. If fetal movement is absent or fetal kick counts are low please contact us right away.    If you ever have any concerns about your baby's movements DO NOT HESITATE to call us, we are here for you!    [facility]  [phone]      GROUP B STREP    Group B Strep (GBS) is a common bacteria that is sometimes found in the vagina, urinary tract or rectum.  It is not harmful typically to adults but can cause serious illness in newborns.  It occasionally is passed from mother to baby during birth.   It is important to test in pregnancy.  When a woman is found to be positive for GBS, either at  "the first prenatal visit or by taking a culture at 36 weeks, treatment will be offered to reduce the chance of spreading the bacteria to the baby.       Treatment consists of either oral antibiotics early in pregnancy or antibiotics given by IV during labor if testing is positive at 36 weeks.      Even without treatment the baby rarely (1-2% of the time) gets infected.  With treatment the baby almost never gets infected.       There really isn't anything you can do to keep from getting or being positive for GBS.  It isn't sexually transmitted and there are no symptoms if you are positive.       Your midwife will discuss your results with you and make recommendations for treatment.    PREECLAMPSIA SIGNS AND SYMPTOMS    Preeclampsia is a dangerous condition that some women develop in the second half of pregnancy. It can also begin after the baby is born.  Preeclampsia causes high blood pressure and can cause problems with many organ systems in your body.  It can also affect the growth of your baby. The exact cause of preeclampsia is unknown, however, there are signs and symptoms to watch for:    -A bad headache that doesn't improve with Tylenol  -Visual changes such as spots, flashes of light, blurry vision  -Pain in the upper right part of your abdomen, especially under the ribs that doesn't go away  -Nausea and/or vomiting  -Feeling extremely tired  -Yellowing of the skin and/or eyes  -Feeling \"not quite right\" or that something is wrong  -An extreme amount of swelling (some swelling in pregnancy is very normal)    If your midwife feels that you are developing preeclampsia, you will have lab tests drawn and will be monitored very closely.     If you are experiencing anyof these symptoms, call the [facility] for Women immediately at [phone].    SIGNS OF  LABOR    Labor is  if it happens more than three weeks before your due date.    It can be hard to know if you are in labor, since the " symptoms can be like the normal feelings of pregnancy.  Often, the only difference is the symptoms increase or they don't go away.     Signs of  labor can include:      Contractions which can feel like period cramps or gas pain.  You may feel it in the lower part of your abdomen, in your back, or as a pressure feeling in your bottom.  It is often regular, coming every 5 or 10 minutes, and  lasting about 30-60 seconds. Some contractions are normal during pregnancy (Kai mcknight contractions) but if you are feeling more than 5-6 in one hour that is NOT normal    If this occurs empty your bladder, then drink 2-3 glasses of water, eat a snack, and lay down on your left side. Put your hand on your abdomen to count the contractions.  If after one hour of resting you have still had 5-6 contractions call your clinic right away.      If you feel a pop, gush, or trickle of fluid it may mean that your bag of water has broken and you should contact the clinic       You may also experience loose stools and/or rectal pressure       Listen to your body, if something doesn't seem right please call us at the clinic    Risk Factors      Previous  delivery    Bacterial Vaginosis- if you notice a fishy smell to your discharge or experience vaginal itching/discomfort you should be evaluated for infection    Smoking    Drug abuse    Adolescent (teen) pregnancy or advanced maternal age (AMA) age 35 and over    Dehydration (this may not cause  labor but it can cause contractions)    If you think you are in  labor we may do some lab testing in the clinic or send you to the hospital for evaluation    Please call us if you are concerned you are in  labor.    [facility] for [facility]  [phone]

## 2019-04-16 NOTE — PROGRESS NOTES
Gestational Diabetes Follow-up    Subjective/Objective:    Rocio MAU Severo sent in blood glucose log for review. Last date of communication was: 3/20.    Gestational diabetes is being managed with diet and activity    Taking diabetes medications: no    Estimated Date of Delivery: May 23, 2019    BG/Food Log:       Assessment:    Ketones: not checked.   Fasting blood glucoses: 100% in target.  After breakfast: 79% in target.  After lunch: 100% in target.  After dinner: 85% in target.    Plan/Response:  No changes in the patient's current treatment plan.    CDE reply:  Asmi Jennings,   Thank you for the update on numbers!  Right now it looks like most of the readings are in goal, good news for you and baby J    It is possible to see the numbers increase as baby grows approaching delivery, so it s a good idea to check in once each week until baby is here.  Can you please send again at the end of the next page in the log book? 22nd or 23rd?     Thank you! Elaine Rivas RD, LD, CDE      Any diabetes medication dose changes were made via the CDE Protocol and Collaborative Practice Agreement with the patient's OB/GYN provider. A copy of this encounter was shared with the provider.      ADDENDUM:  Patient response:    Asim Henry    Sure I will update you. I am sorry I was late for two weeks because I was moving apartment and get so busy with work.

## 2019-04-16 NOTE — PROGRESS NOTES
Feels well. Last two weeks was moving to a new location. Was checking sugars, and writing them down, just was not sending them to [facility]. Discussed she needs to be sending logs to [facility] or unfortunately we will have to transfer care to an MD. Dick states she sent over her log last night.   Will call [facility] to confirm this information and see if she can continue follow up with them. Previous message was sent to Midwife group stating Dick had not responded to follow-up phone calls or sent in her BS logs and she would no longer be followed up with.     One high BS from 4/9-4/15 (145 after breakfast)  4/2-4/8 Fasting BS of 166 and one increased after supper of 150  3/26-4/1, 3 increased after breakfast (154,155,145, 153) One elevated fasting of 100. One elevated after lunch of 122 (2 hrs PP) One elevated after supper of 152  3/19-3/25 One increased fasting of 95, Increased after breakfast of 154, after lunch (148, 144, 153) and one increased after supper of 154 and 122 (after 2 hours PP)    Fetal Movement: positive, denies loss of fluid/vb, no contractions  Fetal kick counts/movement reviewed    Reviewed PTL precautions and s/sx of preeclampsia; denies any S&S and aware of what to report     Taking hospital tour?  Would like too.   Have car seat: Hasn't purchased yet.   Discussed GBS/cx check at next visit    Return to clinic ~1 week        **Tdap at 36 week visit  CBC and TSH/T4 at 36 weeks  Growth US at 36 weeks**    Kendra NELSON CNM

## 2019-04-22 ENCOUNTER — PRENATAL OFFICE VISIT (OUTPATIENT)
Dept: MIDWIFE SERVICES | Facility: CLINIC | Age: 34
End: 2019-04-22
Payer: COMMERCIAL

## 2019-04-22 ENCOUNTER — TELEPHONE (OUTPATIENT)
Dept: MIDWIFE SERVICES | Facility: CLINIC | Age: 34
End: 2019-04-22

## 2019-04-22 ENCOUNTER — TRANSFERRED RECORDS (OUTPATIENT)
Dept: HEALTH INFORMATION MANAGEMENT | Facility: CLINIC | Age: 34
End: 2019-04-22

## 2019-04-22 VITALS — DIASTOLIC BLOOD PRESSURE: 58 MMHG | WEIGHT: 150.6 LBS | BODY MASS INDEX: 24.31 KG/M2 | SYSTOLIC BLOOD PRESSURE: 92 MMHG

## 2019-04-22 DIAGNOSIS — O09.93 SUPERVISION OF HIGH RISK PREGNANCY IN THIRD TRIMESTER: Primary | ICD-10-CM

## 2019-04-22 DIAGNOSIS — E89.0 POSTABLATIVE HYPOTHYROIDISM: ICD-10-CM

## 2019-04-22 DIAGNOSIS — Z3A.35 35 WEEKS GESTATION OF PREGNANCY: Primary | ICD-10-CM

## 2019-04-22 DIAGNOSIS — Z23 NEED FOR TDAP VACCINATION: ICD-10-CM

## 2019-04-22 DIAGNOSIS — D68.59: ICD-10-CM

## 2019-04-22 DIAGNOSIS — O99.119 THROMBOCYTOPENIA AFFECTING PREGNANCY, ANTEPARTUM (H): ICD-10-CM

## 2019-04-22 DIAGNOSIS — O21.0 HYPEREMESIS GRAVIDARUM, ANTEPARTUM: ICD-10-CM

## 2019-04-22 DIAGNOSIS — O99.119: ICD-10-CM

## 2019-04-22 DIAGNOSIS — O24.410 DIET CONTROLLED GESTATIONAL DIABETES MELLITUS (GDM) IN THIRD TRIMESTER: ICD-10-CM

## 2019-04-22 DIAGNOSIS — O09.93 SUPERVISION OF HIGH RISK PREGNANCY IN THIRD TRIMESTER: ICD-10-CM

## 2019-04-22 DIAGNOSIS — D69.6 THROMBOCYTOPENIA AFFECTING PREGNANCY, ANTEPARTUM (H): ICD-10-CM

## 2019-04-22 LAB
ERYTHROCYTE [DISTWIDTH] IN BLOOD BY AUTOMATED COUNT: 15.3 % (ref 10–15)
HCT VFR BLD AUTO: 37.8 % (ref 35–47)
HGB BLD-MCNC: 13.1 G/DL (ref 11.7–15.7)
MCH RBC QN AUTO: 31 PG (ref 26.5–33)
MCHC RBC AUTO-ENTMCNC: 34.7 G/DL (ref 31.5–36.5)
MCV RBC AUTO: 89 FL (ref 78–100)
PLATELET # BLD AUTO: 129 10E9/L (ref 150–450)
RBC # BLD AUTO: 4.23 10E12/L (ref 3.8–5.2)
WBC # BLD AUTO: 9.3 10E9/L (ref 4–11)

## 2019-04-22 PROCEDURE — 90471 IMMUNIZATION ADMIN: CPT | Performed by: NURSE PRACTITIONER

## 2019-04-22 PROCEDURE — 99207 ZZC PRENATAL VISIT: CPT | Performed by: NURSE PRACTITIONER

## 2019-04-22 PROCEDURE — 84443 ASSAY THYROID STIM HORMONE: CPT | Performed by: NURSE PRACTITIONER

## 2019-04-22 PROCEDURE — 84439 ASSAY OF FREE THYROXINE: CPT | Performed by: NURSE PRACTITIONER

## 2019-04-22 PROCEDURE — 90715 TDAP VACCINE 7 YRS/> IM: CPT | Performed by: NURSE PRACTITIONER

## 2019-04-22 PROCEDURE — 36415 COLL VENOUS BLD VENIPUNCTURE: CPT | Performed by: NURSE PRACTITIONER

## 2019-04-22 PROCEDURE — 85027 COMPLETE CBC AUTOMATED: CPT | Performed by: NURSE PRACTITIONER

## 2019-04-22 NOTE — NURSING NOTE
Screening Questionnaire for Adult Immunization    Are you sick today?   No   Do you have allergies to medications, food, a vaccine component or latex?   No   Have you ever had a serious reaction after receiving a vaccination?   No   Do you have a long-term health problem with heart disease, lung disease, asthma, kidney disease, metabolic disease (e.g. diabetes), anemia, or other blood disorder?   No   Do you have cancer, leukemia, HIV/AIDS, or any other immune system problem?   No   In the past 3 months, have you taken medications that affect  your immune system, such as prednisone, other steroids, or anticancer drugs; drugs for the treatment of rheumatoid arthritis, Crohn s disease, or psoriasis; or have you had radiation treatments?   No   Have you had a seizure, or a brain or other nervous system problem?   No   During the past year, have you received a transfusion of blood or blood     products, or been given immune (gamma) globulin or antiviral drug?   No   For women: Are you pregnant or is there a chance you could become        pregnant during the next month?   yes   Have you received any vaccinations in the past 4 weeks?   No     Immunization questionnaire was positive for at least one answer.  Notified Faby Pérez.        Per orders of Faby Pérez, injection of Tdap given by Whit Kelley. Patient instructed to remain in clinic for 15 minutes afterwards, and to report any adverse reaction to me immediately.       Screening performed by Whit Kelley on 4/22/2019 at 9:58 AM.

## 2019-04-22 NOTE — TELEPHONE ENCOUNTER
Darrian from [facility]  With test results.  Cephalic presentation today  36w 1d    JOJO 14.33 cm      Will forward info to ADDIE

## 2019-04-22 NOTE — PROGRESS NOTES
Feels well. Here alone.  Fetal Movement: positive, denies loss of fluid/vb, no contractions  Fetal kick counts/movement reviewed  Reviewed PTL precautions and s/sx of preeclampsia; denies any S&S and aware of what to report  Checking blood sugars according to directions  Reviewed total weight gain of 3.901 kg (8 lb 9.6 oz), low with adequate interval increase for expected twg of 11.5 kg (25 lb)-16 kg (35 lb)  CBC, TSH, & T4 redrawn today  Tdap given today  [facility]-- measuring 36w1d, , vertex, EFW 2834g (73%).  Baby Box given:  Yes  Taking hospital tour?  Yes-- working on scheduling  Have car seat No-- working on  Discussed GBS/cx check at next visit  Return to clinic 1 weeks    SHE Baron  [facility] SALGADO, Renata Reynolds, am serving as a scribe; to document services personally performed by Faby NELSON CNM- based on data collection and the provider's statements to me.    Provider Disclosure:  I agree with above History, Review of Systems, Physical exam and Plan. I have reviewed the content of the documentation and have edited it as needed. I have personally performed the services documented here and the documentation accurately represents those services and the decisions I have made.    Faby NELSON CNM

## 2019-04-22 NOTE — PATIENT INSTRUCTIONS

## 2019-04-23 LAB
T4 FREE SERPL-MCNC: 1.27 NG/DL (ref 0.76–1.46)
TSH SERPL DL<=0.005 MIU/L-ACNC: 0.17 MU/L (ref 0.4–4)

## 2019-04-24 ENCOUNTER — TELEPHONE (OUTPATIENT)
Dept: OBGYN | Facility: CLINIC | Age: 34
End: 2019-04-24

## 2019-04-24 ENCOUNTER — PATIENT OUTREACH (OUTPATIENT)
Dept: EDUCATION SERVICES | Facility: CLINIC | Age: 34
End: 2019-04-24

## 2019-04-24 NOTE — RESULT ENCOUNTER NOTE
Please call with results. Please ensure that she is aware that her platelet level has dropped from 138 to 129.  No follow up needs to be done, but we will make sure to check her platelets while she is in labor.  Also, please make sure she gets her TSH and free T4 numbers so she can notify her Endocrinologist.  Thanks!    OMAR Richardson CNM

## 2019-04-24 NOTE — PROGRESS NOTES
Gestational Diabetes Follow-up    Subjective/Objective:    Rocio Elkins sent in blood glucose log for review. Last date of communication was: 4/16/19.    Gestational diabetes is being managed with diet and activity    Taking diabetes medications: no    Estimated Date of Delivery: May 23, 2019    BG/Food Log:       Assessment:    Ketones: na.   Fasting blood glucoses: 100% in target.  After breakfast: 85% in target.  After lunch: 71% in target.  After dinner: 100% in target.    Plan/Response:  No changes in the patient's current treatment plan.  Follow-up in 1 week.    Asim Chan,    Svitlana for the update! Overall, things are looking pretty good with just a few spot high numbers after breakfast or lunch. Let s have you email us again next Wednesday for another check in. You re almost in the home stretch now! J    Have a great week,  Aleyda Gilliland RD LD CDE    Any diabetes medication dose changes were made via the CDE Protocol and Collaborative Practice Agreement with the patient's OB/GYN provider. A copy of this encounter was shared with the provider.      ADDENDUM:    Patient reply:  Asim Maynard     yes it looks pretty good over all and i know when i got those high numbers, only when i don't behave Lol, i am glad i am almost there i just can't wait till this thing over     thanks!

## 2019-05-01 ENCOUNTER — PATIENT OUTREACH (OUTPATIENT)
Dept: EDUCATION SERVICES | Facility: CLINIC | Age: 34
End: 2019-05-01

## 2019-05-01 NOTE — PROGRESS NOTES
Gestational Diabetes Follow-up    Subjective/Objective:    Rocio Elkins sent in blood glucose log for review. Last date of communication was: 4/24/19.    Gestational diabetes is being managed with diet and activity    Taking diabetes medications: no    Estimated Date of Delivery: May 23, 2019    BG/Food Log:       Assessment:    Ketones: na.   Fasting blood glucoses: 85% in target.  After breakfast: 85% in target.  After lunch: 85% in target.  After dinner: 85% in target.    Plan/Response:  No changes in the patient's current treatment plan.  Follow-up in 1 week.    Asim Chan,    Thanks for the updates! You are so close now, just a few more weeks before baby! You re doing great, so let s check in next Wednesday again.    Have a great week!  Aleyda Gilliland RD LD CDE    Any diabetes medication dose changes were made via the CDE Protocol and Collaborative Practice Agreement with the patient's OB/GYN provider. A copy of this encounter was shared with the provider.

## 2019-05-02 ENCOUNTER — PRENATAL OFFICE VISIT (OUTPATIENT)
Dept: MIDWIFE SERVICES | Facility: CLINIC | Age: 34
End: 2019-05-02
Payer: COMMERCIAL

## 2019-05-02 VITALS — WEIGHT: 151 LBS | SYSTOLIC BLOOD PRESSURE: 104 MMHG | BODY MASS INDEX: 24.37 KG/M2 | DIASTOLIC BLOOD PRESSURE: 60 MMHG

## 2019-05-02 DIAGNOSIS — D69.6 THROMBOCYTOPENIA AFFECTING PREGNANCY, ANTEPARTUM (H): ICD-10-CM

## 2019-05-02 DIAGNOSIS — E89.0 POSTABLATIVE HYPOTHYROIDISM: ICD-10-CM

## 2019-05-02 DIAGNOSIS — Z3A.37 37 WEEKS GESTATION OF PREGNANCY: ICD-10-CM

## 2019-05-02 DIAGNOSIS — O24.410 DIET CONTROLLED GESTATIONAL DIABETES MELLITUS (GDM) IN THIRD TRIMESTER: ICD-10-CM

## 2019-05-02 DIAGNOSIS — O09.93 SUPERVISION OF HIGH RISK PREGNANCY IN THIRD TRIMESTER: Primary | ICD-10-CM

## 2019-05-02 DIAGNOSIS — O99.119 THROMBOCYTOPENIA AFFECTING PREGNANCY, ANTEPARTUM (H): ICD-10-CM

## 2019-05-02 DIAGNOSIS — Z36.85 ENCOUNTER FOR ANTENATAL SCREENING FOR STREPTOCOCCUS B: ICD-10-CM

## 2019-05-02 PROCEDURE — 87653 STREP B DNA AMP PROBE: CPT | Performed by: ADVANCED PRACTICE MIDWIFE

## 2019-05-02 PROCEDURE — 99207 ZZC PRENATAL VISIT: CPT | Performed by: ADVANCED PRACTICE MIDWIFE

## 2019-05-02 NOTE — PATIENT INSTRUCTIONS
"Labor Instructions for Midwife Patients    When to call:  Both during and after office hours call  [phone]. There is a triage RN to take your calls and answer your questions 24 hours a day.  If she cannot answer your question she will page the midwife on call for you.    When to call:  Call anytime you have important concerns about you or your baby.     Call if:    You are having contractions at regular intervals about 5-6 minutes apart lasting 30-60 seconds and becoming increasingly more intense     You have an uncontrollable gush of fluid from your vagina or feel a pop and gush like your water has broken    You have HEAVY bleeding, like heavy period, blood running down your legs, or  soaking a pad.     Some bleeding after a pelvic exam, after intercourse, or in labor when your cervix is dilating is normal and is referred to as \"bloody show\"    You have severe, continuous back or abdominal pain    You feel it is time to go to the hospital    If this is your first labor, call when contractions are very intense and have been about every 3-4 minutes for about an hour    If it is your second labor or more, call when contractions are strong and about every 3-5 minutes or sooner depending on your level of discomfort.     Keep in mind we are always here for you! If you have questions, concerns please don't hesitate to call us.     What to eat/drink in labor: Drink plenty of fluid (water most importantly, juice, soda or tea without caffeine). Eat rice, pasta, soup, cereal, bread/toast, and fruit. Avoid dairy and greasy food as they are difficult to digest and you may experience some nausea during labor.    Comfort measures:    Baths and showers (ok even with ruptured membranes, it may temporarily slow contractions if you are still in the early stage of labor)    Warm/hot packs for back pain or discomfort    Back, belly, or thigh massages    Standing, rocking, walking, leaning over bed or tables, side-lying and " sleeping    Miscellaneous:     Contractions are timed from the beginning of one to the beginning of the next    Try hard to sleep during the early stage of labor when you are not that uncomfortable. Timing of contractions at this point is not important    Even if you cannot sleep, resting in bed or on the couch can help you maintain your energy for labor    When you arrive at the hospital the nurse will check your baby's heartbeat, check your cervix, and will call us. The midwife on call will come in and be with you when you are in active labor    After hours you need to enter the hospital through the emergency room      Fetal Kick Counts    It is important to know when your baby's movements occur. We often get busy with work and life and do not pay close attention to their movements.        Women typically begin feeling movement between 18-22 weeks of gestation, sometimes it can be earlier or later depending on where your placenta is       Movements usually begin feeling like popping or fluttering and as the baby grows they become more pronounced    Toward the end of pregnancy as the baby gets larger they may not move as much or make as big of movements. Babies have maturing sleep cycles as well as not as much room to move and flip. If you are ever concerned about your baby's movements or have not felt the baby move for a while, we recommend you do a fetal kick count. Prior to starting your count drink a glass of water or juice and eat a snack. Then lay down on your side and begin to count movements.     How to do a Fetal Kick Counts    There are many different ways to monitor your baby's movements. Movements can range from large jabs to small kicks, or wiggles.  Hiccups count!      Count 10 movements in 2 hours when resting and focusing    Count 10 movements in 12 hours when doing normal activity    We recommend that if movements occur but seem decreased that you should be seen in the clinic or hospital for  "evaluation within 12 hours. If fetal movement is absent or fetal kick counts are low please contact us right away.    If you ever have any concerns about your baby's movements DO NOT HESITATE to call us, we are here for you!    [facility]  [phone]        PREECLAMPSIA SIGNS AND SYMPTOMS    Preeclampsia is a dangerous condition that some women develop in the second half of pregnancy. It can also begin after the baby is born.  Preeclampsia causes high blood pressure and can cause problems with many organ systems in your body.  It can also affect the growth of your baby. The exact cause of preeclampsia is unknown, however, there are signs and symptoms to watch for:    -A bad headache that doesn't improve with Tylenol  -Visual changes such as spots, flashes of light, blurry vision  -Pain in the upper right part of your abdomen, especially under the ribs that doesn't go away  -Nausea and/or vomiting  -Feeling extremely tired  -Yellowing of the skin and/or eyes  -Feeling \"not quite right\" or that something is wrong  -An extreme amount of swelling (some swelling in pregnancy is very normal)    If your midwife feels that you are developing preeclampsia, you will have lab tests drawn and will be monitored very closely.     If you are experiencing anyof these symptoms, call the [facility] immediately at [phone].  "

## 2019-05-02 NOTE — PROGRESS NOTES
Feels great! Here alone today.  TSH was 0.17 two weeks ago and results were faxed to Endocrine.  Dick is planning to contact Endo to see if she needs a dose adjustment.  Fetal movement: positive  Denies bleeding/lof, a little contractions here and there  GBS swab done today  Confirmed cephalic via bedside US  Cx:  Declined  Labor instructions given  Labor preferences/birth plan reviewed: Planning an epidural  Breast pump Rx: no, does not believe she needs one. Will let us know if she does.  Dick has been staying in contact with Diabetes Education; last communication was yesterday.  Blood glucose levels have been 85% in target; no change in plans. Encouraged Dick to continue checking blood sugars until baby is born and to stay in close contact with Diabetes Ed.    Warning signs reviewed  Patient denies S&S of pre-eclampsia and aware of what to report   Return to clinic 1 week    Rachel Bonner, DNP, APRN, CNM

## 2019-05-03 LAB
GP B STREP DNA SPEC QL NAA+PROBE: NEGATIVE
SPECIMEN SOURCE: NORMAL

## 2019-05-04 NOTE — RESULT ENCOUNTER NOTE
Please call the patient with the results. GBS is negative.    Thanks--   Rachel Sky, MADELINE, APRN, CNM

## 2019-05-09 ENCOUNTER — PRENATAL OFFICE VISIT (OUTPATIENT)
Dept: MIDWIFE SERVICES | Facility: CLINIC | Age: 34
End: 2019-05-09
Payer: COMMERCIAL

## 2019-05-09 VITALS — SYSTOLIC BLOOD PRESSURE: 90 MMHG | WEIGHT: 153 LBS | BODY MASS INDEX: 24.69 KG/M2 | DIASTOLIC BLOOD PRESSURE: 60 MMHG

## 2019-05-09 DIAGNOSIS — O21.0 HYPEREMESIS GRAVIDARUM, ANTEPARTUM: ICD-10-CM

## 2019-05-09 DIAGNOSIS — O09.93 SUPERVISION OF HIGH RISK PREGNANCY IN THIRD TRIMESTER: ICD-10-CM

## 2019-05-09 DIAGNOSIS — D69.6 THROMBOCYTOPENIA AFFECTING PREGNANCY, ANTEPARTUM (H): ICD-10-CM

## 2019-05-09 DIAGNOSIS — Z3A.38 38 WEEKS GESTATION OF PREGNANCY: ICD-10-CM

## 2019-05-09 DIAGNOSIS — O24.410 DIET CONTROLLED GESTATIONAL DIABETES MELLITUS (GDM) IN THIRD TRIMESTER: Primary | ICD-10-CM

## 2019-05-09 DIAGNOSIS — O99.119 THROMBOCYTOPENIA AFFECTING PREGNANCY, ANTEPARTUM (H): ICD-10-CM

## 2019-05-09 PROCEDURE — 99207 ZZC PRENATAL VISIT: CPT | Performed by: NURSE PRACTITIONER

## 2019-05-09 NOTE — PROGRESS NOTES
"Feels tired but \"good\".  States that she had a headache Tuesday evening that resolved with sleeping.  Denies vision changes, RUQ pain, other signs and symptoms preeclampsia.  She has been monitoring her blood glucose readings, states that she forgot to submit readings to diabetic ed; plans to send readings today.  She states that overall readings have been WNL, except \"for 2 or 3\".  Fetal movement: positive  Denies vaginal bleeding/lof, some contractions that resolve on own  Warning signs reviewed   Labor signs and symptoms discussed, aware of numbers to call  Denies s/sx of pre-eclampsia and aware of what to report  Encouraged to send blood glucose readings to diabetic ed and continue monitoring glucose readings.    [place] handout and [place]  Project info given per request; is considering a   Has hospital tour planned for next Tuesday  Endo received labs; Dick states that plan is continue to take 137mcg synthroid daily 6 days per week and then have 4-6 week PP follow up labs done  Return to clinic 1 week OB visit    Faby NELSON, BARTOLO, WHNP-BC  [facility]        "

## 2019-05-16 ENCOUNTER — PRENATAL OFFICE VISIT (OUTPATIENT)
Dept: MIDWIFE SERVICES | Facility: CLINIC | Age: 34
End: 2019-05-16
Payer: COMMERCIAL

## 2019-05-16 VITALS — WEIGHT: 151.4 LBS | SYSTOLIC BLOOD PRESSURE: 92 MMHG | DIASTOLIC BLOOD PRESSURE: 62 MMHG | BODY MASS INDEX: 24.44 KG/M2

## 2019-05-16 DIAGNOSIS — O99.013 ANEMIA AFFECTING PREGNANCY IN THIRD TRIMESTER: ICD-10-CM

## 2019-05-16 DIAGNOSIS — E89.0 POSTABLATIVE HYPOTHYROIDISM: ICD-10-CM

## 2019-05-16 DIAGNOSIS — D69.6 THROMBOCYTOPENIA AFFECTING PREGNANCY, ANTEPARTUM (H): ICD-10-CM

## 2019-05-16 DIAGNOSIS — O99.119 THROMBOCYTOPENIA AFFECTING PREGNANCY, ANTEPARTUM (H): ICD-10-CM

## 2019-05-16 DIAGNOSIS — O24.410 DIET CONTROLLED GESTATIONAL DIABETES MELLITUS (GDM) IN THIRD TRIMESTER: ICD-10-CM

## 2019-05-16 DIAGNOSIS — Z3A.39 39 WEEKS GESTATION OF PREGNANCY: ICD-10-CM

## 2019-05-16 DIAGNOSIS — O09.93 SUPERVISION OF HIGH RISK PREGNANCY IN THIRD TRIMESTER: Primary | ICD-10-CM

## 2019-05-16 LAB
ERYTHROCYTE [DISTWIDTH] IN BLOOD BY AUTOMATED COUNT: 14.8 % (ref 10–15)
HCT VFR BLD AUTO: 37.1 % (ref 35–47)
HGB BLD-MCNC: 12.5 G/DL (ref 11.7–15.7)
MCH RBC QN AUTO: 29.7 PG (ref 26.5–33)
MCHC RBC AUTO-ENTMCNC: 33.7 G/DL (ref 31.5–36.5)
MCV RBC AUTO: 88 FL (ref 78–100)
PLATELET # BLD AUTO: 118 10E9/L (ref 150–450)
RBC # BLD AUTO: 4.21 10E12/L (ref 3.8–5.2)
WBC # BLD AUTO: 9.2 10E9/L (ref 4–11)

## 2019-05-16 PROCEDURE — 36415 COLL VENOUS BLD VENIPUNCTURE: CPT | Performed by: ADVANCED PRACTICE MIDWIFE

## 2019-05-16 PROCEDURE — 85027 COMPLETE CBC AUTOMATED: CPT | Performed by: ADVANCED PRACTICE MIDWIFE

## 2019-05-16 PROCEDURE — 99207 ZZC PRENATAL VISIT: CPT | Performed by: ADVANCED PRACTICE MIDWIFE

## 2019-05-17 ENCOUNTER — TELEPHONE (OUTPATIENT)
Dept: MIDWIFE SERVICES | Facility: CLINIC | Age: 34
End: 2019-05-17

## 2019-05-17 NOTE — TELEPHONE ENCOUNTER
Pt calling with intermittent cramping, low back pain and tightening.   Active baby, no bleeding, no loss of fluid, denies pain.  Discussed labor precautions and normal late pregnancy symptoms.   Pt is at work right now-advised to stay well hydrated. Rest this evening when she goes home.   Pt has no further questions  Floresita Sherman RN on 5/17/2019 at 3:13 PM

## 2019-05-21 ENCOUNTER — HOSPITAL ENCOUNTER (OUTPATIENT)
Facility: CLINIC | Age: 34
Discharge: HOME | End: 2019-05-21
Attending: ADVANCED PRACTICE MIDWIFE | Admitting: ADVANCED PRACTICE MIDWIFE
Payer: COMMERCIAL

## 2019-05-21 ENCOUNTER — PATIENT OUTREACH (OUTPATIENT)
Dept: EDUCATION SERVICES | Facility: CLINIC | Age: 34
End: 2019-05-21

## 2019-05-21 ENCOUNTER — TELEPHONE (OUTPATIENT)
Dept: OBGYN | Facility: CLINIC | Age: 34
End: 2019-05-21

## 2019-05-21 VITALS — DIASTOLIC BLOOD PRESSURE: 64 MMHG | RESPIRATION RATE: 18 BRPM | SYSTOLIC BLOOD PRESSURE: 105 MMHG | TEMPERATURE: 98.2 F

## 2019-05-21 DIAGNOSIS — O24.410 DIET CONTROLLED GESTATIONAL DIABETES MELLITUS (GDM) IN SECOND TRIMESTER: Primary | ICD-10-CM

## 2019-05-21 PROBLEM — O26.893 VAGINAL DISCHARGE DURING PREGNANCY IN THIRD TRIMESTER: Status: ACTIVE | Noted: 2019-05-21

## 2019-05-21 PROBLEM — N89.8 VAGINAL DISCHARGE DURING PREGNANCY IN THIRD TRIMESTER: Status: ACTIVE | Noted: 2019-05-21

## 2019-05-21 LAB — RUPTURE OF FETAL MEMBRANES BY ROM PLUS: NEGATIVE

## 2019-05-21 PROCEDURE — G0463 HOSPITAL OUTPT CLINIC VISIT: HCPCS | Mod: 25

## 2019-05-21 PROCEDURE — 84112 EVAL AMNIOTIC FLUID PROTEIN: CPT | Performed by: ADVANCED PRACTICE MIDWIFE

## 2019-05-21 PROCEDURE — 59025 FETAL NON-STRESS TEST: CPT

## 2019-05-21 PROCEDURE — 59025 FETAL NON-STRESS TEST: CPT | Mod: 26 | Performed by: ADVANCED PRACTICE MIDWIFE

## 2019-05-21 RX ORDER — ONDANSETRON 2 MG/ML
4 INJECTION INTRAMUSCULAR; INTRAVENOUS EVERY 6 HOURS PRN
Status: DISCONTINUED | OUTPATIENT
Start: 2019-05-21 | End: 2019-05-21 | Stop reason: HOSPADM

## 2019-05-21 NOTE — PROGRESS NOTES
Gestational Diabetes Follow-up    Subjective/Objective:    Rocio Elkins sent in blood glucose log for review. Last date of communication was: 5/1/19.    Gestational diabetes is being managed with diet and activity    Taking diabetes medications: no    Estimated Date of Delivery: May 23, 2019    BG/Food Log:         Assessment:  Blood sugars in target with diet plan.  No changes needed.     Ketones: negative 2 weeks ago.   Fasting blood glucoses: 100% in target.  After breakfast: 95% in target.  After lunch: 95% in target.  After dinner: 100% in target.    Plan/Response:  No changes in the patient's current treatment plan.  Follow up if elevated or as needed for questions.    Christine Kim MS, RD, LD, CDE      Any diabetes medication dose changes were made via the CDE Protocol and Collaborative Practice Agreement with the patient's OB/GYN provider. A copy of this encounter was shared with the provider.    E-mail reply to patient:  Asim Chan,    Thanks for sending in your readings.  Luckily your food plan is working well and almost all of your readings are in target.  No changes needed just keep up the great work, you re almost there.    I included a copy of blank logbook sheets which should get you through the end of your pregnancy.     No further follow up is needed unless you see high readings you can t explain.    We expect your blood sugars to go back to normal right after delivery, as once the placenta is no longer there, the hormones causing higher blood sugars are gone. After delivery, we do recommend to check blood sugars 4 times a week, once fasting and once 2 hours after each of your meals, just to keep tabs on the numbers and be sure they are in goal. After delivery, blood sugar goals change a bit - fasting before breakfast goal is less than 100 and 2 hours after meals is less than 140. Your OB doctor should have you do a 2-hour oral glucose tolerance test at your 6 week postpartum visit just to be sure  blood glucose is back to normal.    Take care!      Christine Kim MS, RD, LD, CDE

## 2019-05-21 NOTE — TELEPHONE ENCOUNTER
39w5d, 5/23/19. Pt states she is leaking clear whiteish fluid that almost is like jelly. States every time she stand she is wet and is changing her underwear. Contractions were every 15 mins during the night but are now occ. FM present. Pt informed could be SROM. Needs to lay down and then stand to see if fluid leaks. Pt states that the fluid does leak when she stands. Pt informed she needs to go to [facility]. Pt oes not want to go. Informed will review with Jevon Montero and call pt back. Reviewed by Jevon and pt needs to go in to have it checked out. Pt informed. [facility] informed.   
No pertinent family history in first degree relatives

## 2019-05-21 NOTE — PLAN OF CARE
Patient presents to [facility] with reports of vaginal leaking. Denies any bleeding. Reports + fetal movement. Reports occasional michael.    Verbal permission given to apply external monitors. History and vital signs taken. ROM + collected.     1145:Jevon Montero CNM at bedside. Fetal heart rate tracing reviewed at bedside. Updated on negative ROM + results. SVE performed by Jevon Montero CNM. Patient closed.     Verbal orders to discharge patient home.

## 2019-05-21 NOTE — DISCHARGE INSTRUCTIONS
Discharge Instruction for Undelivered Patients      You were seen for: Membrane Assessment  We Consulted: Lesly Montero CNM  You had (Test or Medicine):Non stress test, ROM+     Diet:   Drink 8 to 12 glasses of liquids (milk, juice, water) every day.     Activity:  Call your doctor or nurse midwife if your baby is moving less than usual.     Call your provider if you notice:  Swelling in your face or increased swelling in your hands or legs.  Headaches that are not relieved by Tylenol (acetaminophen).  Changes in your vision (blurring: seeing spots or stars.)  Nausea (sick to your stomach) and vomiting (throwing up).   Weight gain of 5 pounds or more per week.  Heartburn that doesn't go away.  Signs of bladder infection: pain when you urinate (use the toilet), need to go more often and more urgently.  The bag of campos (rupture of membranes) breaks, or you notice leaking in your underwear.  Bright red blood in your underwear.  Abdominal (lower belly) or stomach pain.  For first baby: Contractions (tightening) less than 5 minutes apart for one hour or more.  Increase or change in vaginal discharge (note the color and amount)      Follow-up:  As scheduled in the clinic

## 2019-05-21 NOTE — PROGRESS NOTES
[facility]ERNAL [facility]ESSMENT CENTER CNM TRIAGE NOTE    Rocio Elkins is a 33 year old  with and IUP at 39w5d who presents with complaint of LOF since last night. She states fluid is thick, mucousy, and clear. No complaints of regular contractions.     Patient states baby is active.  Denies ROM   Denies vaginal bleeding  [facility]     Problems this pregnancy:   Diet controlled GDM  Thrombocytopenia  Anemia  Postablative hypothyroidism  Latent TB  Hyperemesis in early pregnancy      ROS:  Patient is alert and oriented      PHYSICAL EXAM:  /64   Temp 98.2  F (36.8  C) (Temporal)   Resp 18   LMP 2018 (Exact Date)     FHT's 135 bpm with moderate variability  Accelerations: present   Decelerations:  absent        Contractions: Pt is not michael regularly    Abdomen: soft  Bloody show: no  Cervix: closed/ thick/ Posterior/ firm/ FLOATING  Membranes are intact     ROM plus negative      ASSESSMENT :   33 year old  with olguin IUP 39w5d here for rule out SROM  ROM Plus negative  NST  reactive  GBS negative and membranes intact      PLAN:  Discharge home  Continue routine prenatal care : appointment on Thursday.    Teaching done r/t to s/s of labor, SROM, decreased fetal movement, comfort measures in third trimester.  Instructed to please refer to the discharge handouts, the RN triage line or on-call CNM for any questions or concerns.  Pt verbalizes understanding and agreement with current plan of care.    OMAR MosleyM

## 2019-05-22 NOTE — PROGRESS NOTES
Feels well overall  Fetal movement: positive  Denies vaginal bleeding/lof, no contractions    Warning signs reviewed   Labor signs and symptoms discussed  Denies s/sx of pre-eclampsia and aware of what to report  Discussed post dates management, order for BPP at 41 weeks, discussed recommendation for induction of labor after 41 weeks  Return to clinic 1 week    VE: Closed/ thick/ high / posterior/ firm    Estrada Score:  Dilation of Cervix:  0        Score = 0  Effacement:  0-30%;   Score = 0  Consistency:  Firm;   Score = 0  Position:  Posterior;   Score = 0  Station:  -3;  Score = 0  Total Estrada Score:  0    Induction scheduled for 5/30/18 (Thursday) at 0730 due to diet controlled GDM and post-dates at 41 weeks.    CBC drawn today d/t thrombocytopenia. Platelets were at 115 - no major changes from 1 week ago. Therefore no need to move up induction.     Jevon Montero, DNP, APRN, CNM

## 2019-05-23 ENCOUNTER — PRENATAL OFFICE VISIT (OUTPATIENT)
Dept: MIDWIFE SERVICES | Facility: CLINIC | Age: 34
End: 2019-05-23
Payer: COMMERCIAL

## 2019-05-23 VITALS — BODY MASS INDEX: 24.69 KG/M2 | SYSTOLIC BLOOD PRESSURE: 120 MMHG | WEIGHT: 153 LBS | DIASTOLIC BLOOD PRESSURE: 80 MMHG

## 2019-05-23 DIAGNOSIS — O24.410 DIET CONTROLLED GESTATIONAL DIABETES MELLITUS (GDM) IN THIRD TRIMESTER: ICD-10-CM

## 2019-05-23 DIAGNOSIS — O09.93 SUPERVISION OF HIGH RISK PREGNANCY IN THIRD TRIMESTER: Primary | ICD-10-CM

## 2019-05-23 DIAGNOSIS — Z3A.40 40 WEEKS GESTATION OF PREGNANCY: ICD-10-CM

## 2019-05-23 DIAGNOSIS — O99.013 ANEMIA AFFECTING PREGNANCY IN THIRD TRIMESTER: ICD-10-CM

## 2019-05-23 DIAGNOSIS — E89.0 POSTABLATIVE HYPOTHYROIDISM: ICD-10-CM

## 2019-05-23 DIAGNOSIS — D69.6 THROMBOCYTOPENIA AFFECTING PREGNANCY, ANTEPARTUM (H): ICD-10-CM

## 2019-05-23 DIAGNOSIS — O99.119 THROMBOCYTOPENIA AFFECTING PREGNANCY, ANTEPARTUM (H): ICD-10-CM

## 2019-05-23 LAB
ERYTHROCYTE [DISTWIDTH] IN BLOOD BY AUTOMATED COUNT: 14.6 % (ref 10–15)
HCT VFR BLD AUTO: 37.4 % (ref 35–47)
HGB BLD-MCNC: 12.4 G/DL (ref 11.7–15.7)
MCH RBC QN AUTO: 29.5 PG (ref 26.5–33)
MCHC RBC AUTO-ENTMCNC: 33.2 G/DL (ref 31.5–36.5)
MCV RBC AUTO: 89 FL (ref 78–100)
PLATELET # BLD AUTO: 115 10E9/L (ref 150–450)
RBC # BLD AUTO: 4.21 10E12/L (ref 3.8–5.2)
WBC # BLD AUTO: 8 10E9/L (ref 4–11)

## 2019-05-23 PROCEDURE — 36415 COLL VENOUS BLD VENIPUNCTURE: CPT | Performed by: ADVANCED PRACTICE MIDWIFE

## 2019-05-23 PROCEDURE — 85027 COMPLETE CBC AUTOMATED: CPT | Performed by: ADVANCED PRACTICE MIDWIFE

## 2019-05-23 PROCEDURE — 99207 ZZC PRENATAL VISIT: CPT | Performed by: ADVANCED PRACTICE MIDWIFE

## 2019-05-23 NOTE — PATIENT INSTRUCTIONS
Post Dates Management    If you've gone beyond your due date you are probably thinking when is this baby coming!  Most babies are born on or after their due date so it is nothing to worry about.    If you are pregnant at 41 weeks we will start some increased monitoring to make sure that your baby and the placenta are healthy enough to continue your pregnancy.      We would have you come to the clinic every 3-4 days to be assessed with an ultrasound called a Biophysical profile (BPP).       This tells us how your baby is doing. We monitor fetal movement, breathing patterns, amniotic fluid level, and heart rate.     Research has shown that by 42 weeks gestation the placenta is not always healthy enough to support the pregnancy further and it is better for the baby to be born.  If you are still pregnant by 41 and a half weeks we will discuss induction of labor with you and the different options available.     Fetal Kick Counts    It is important to know when your baby's movements occur. We often get busy with work and life and do not pay close attention to their movements.        Women typically begin feeling movement between 18-22 weeks of gestation, sometimes it can be earlier or later depending on where your placenta is       Movements usually begin feeling like popping or fluttering and as the baby grows they become more pronounced    Toward the end of pregnancy as the baby gets larger they may not move as much or make as big of movements. Babies have maturing sleep cycles as well as not as much room to move and flip. If you are ever concerned about your baby's movements or have not felt the baby move for a while, we recommend you do a fetal kick count. Prior to starting your count drink a glass of water or juice and eat a snack. Then lay down on your side and begin to count movements.     How to do a Fetal Kick Counts    There are many different ways to monitor your baby's movements. Movements can range from  "large jabs to small kicks, or wiggles.  Hiccups count!      Count 10 movements in 2 hours when resting and focusing    Count 10 movements in 12 hours when doing normal activity    We recommend that if movements occur but seem decreased that you should be seen in the clinic or hospital for evaluation within 12 hours. If fetal movement is absent or fetal kick counts are low please contact us right away.    If you ever have any concerns about your baby's movements DO NOT HESITATE to call us, we are here for you!    [facility]  [phone]      PREECLAMPSIA SIGNS AND SYMPTOMS    Preeclampsia is a dangerous condition that some women develop in the second half of pregnancy. It can also begin after the baby is born.  Preeclampsia causes high blood pressure and can cause problems with many organ systems in your body.  It can also affect the growth of your baby. The exact cause of preeclampsia is unknown, however, there are signs and symptoms to watch for:    -A bad headache that doesn't improve with Tylenol  -Visual changes such as spots, flashes of light, blurry vision  -Pain in the upper right part of your abdomen, especially under the ribs that doesn't go away  -Nausea and/or vomiting  -Feeling extremely tired  -Yellowing of the skin and/or eyes  -Feeling \"not quite right\" or that something is wrong  -An extreme amount of swelling (some swelling in pregnancy is very normal)    If your midwife feels that you are developing preeclampsia, you will have lab tests drawn and will be monitored very closely.     If you are experiencing anyof these symptoms, call the [facility] immediately at [phone].    "

## 2019-05-28 ENCOUNTER — TELEPHONE (OUTPATIENT)
Dept: MIDWIFE SERVICES | Facility: CLINIC | Age: 34
End: 2019-05-28

## 2019-05-28 ENCOUNTER — NURSE TRIAGE (OUTPATIENT)
Dept: NURSING | Facility: CLINIC | Age: 34
End: 2019-05-28

## 2019-05-28 ENCOUNTER — TELEPHONE (OUTPATIENT)
Dept: OBGYN | Facility: CLINIC | Age: 34
End: 2019-05-28

## 2019-05-28 ENCOUNTER — HOSPITAL ENCOUNTER (INPATIENT)
Facility: CLINIC | Age: 34
LOS: 3 days | Discharge: HOME | End: 2019-05-31
Attending: ADVANCED PRACTICE MIDWIFE | Admitting: ADVANCED PRACTICE MIDWIFE
Payer: COMMERCIAL

## 2019-05-28 LAB
ABO + RH BLD: NORMAL
ABO + RH BLD: NORMAL
BASOPHILS # BLD AUTO: 0 10E9/L (ref 0–0.2)
BASOPHILS NFR BLD AUTO: 0.1 %
DIFFERENTIAL METHOD BLD: ABNORMAL
EOSINOPHIL # BLD AUTO: 0 10E9/L (ref 0–0.7)
EOSINOPHIL NFR BLD AUTO: 0.1 %
ERYTHROCYTE [DISTWIDTH] IN BLOOD BY AUTOMATED COUNT: 14.6 % (ref 10–15)
GLUCOSE BLDC GLUCOMTR-MCNC: 74 MG/DL (ref 70–99)
GLUCOSE SERPL-MCNC: 79 MG/DL (ref 70–99)
HCT VFR BLD AUTO: 40.8 % (ref 35–47)
HGB BLD-MCNC: 14.1 G/DL (ref 11.7–15.7)
IMM GRANULOCYTES # BLD: 0 10E9/L (ref 0–0.4)
IMM GRANULOCYTES NFR BLD: 0.3 %
LYMPHOCYTES # BLD AUTO: 1.4 10E9/L (ref 0.8–5.3)
LYMPHOCYTES NFR BLD AUTO: 10.3 %
MCH RBC QN AUTO: 30 PG (ref 26.5–33)
MCHC RBC AUTO-ENTMCNC: 34.6 G/DL (ref 31.5–36.5)
MCV RBC AUTO: 87 FL (ref 78–100)
MONOCYTES # BLD AUTO: 0.5 10E9/L (ref 0–1.3)
MONOCYTES NFR BLD AUTO: 3.3 %
NEUTROPHILS # BLD AUTO: 11.9 10E9/L (ref 1.6–8.3)
NEUTROPHILS NFR BLD AUTO: 85.9 %
NRBC # BLD AUTO: 0 10*3/UL
NRBC BLD AUTO-RTO: 0 /100
PLATELET # BLD AUTO: 123 10E9/L (ref 150–450)
RBC # BLD AUTO: 4.7 10E12/L (ref 3.8–5.2)
SPECIMEN EXP DATE BLD: NORMAL
WBC # BLD AUTO: 13.9 10E9/L (ref 4–11)

## 2019-05-28 PROCEDURE — G0463 HOSPITAL OUTPT CLINIC VISIT: HCPCS | Mod: 25

## 2019-05-28 PROCEDURE — 36415 COLL VENOUS BLD VENIPUNCTURE: CPT | Performed by: ADVANCED PRACTICE MIDWIFE

## 2019-05-28 PROCEDURE — 85025 COMPLETE CBC W/AUTO DIFF WBC: CPT | Performed by: ADVANCED PRACTICE MIDWIFE

## 2019-05-28 PROCEDURE — 25800030 ZZH RX IP 258 OP 636: Performed by: ADVANCED PRACTICE MIDWIFE

## 2019-05-28 PROCEDURE — 59025 FETAL NON-STRESS TEST: CPT

## 2019-05-28 PROCEDURE — 00000146 ZZHCL STATISTIC GLUCOSE BY METER IP

## 2019-05-28 PROCEDURE — 99207 ZZC NON-BILLABLE SERV PER CHARTING: CPT | Performed by: PHYSICIAN ASSISTANT

## 2019-05-28 PROCEDURE — 86901 BLOOD TYPING SEROLOGIC RH(D): CPT | Performed by: ADVANCED PRACTICE MIDWIFE

## 2019-05-28 PROCEDURE — 86780 TREPONEMA PALLIDUM: CPT | Performed by: ADVANCED PRACTICE MIDWIFE

## 2019-05-28 PROCEDURE — 25000128 H RX IP 250 OP 636: Performed by: ADVANCED PRACTICE MIDWIFE

## 2019-05-28 PROCEDURE — 82947 ASSAY GLUCOSE BLOOD QUANT: CPT | Performed by: ADVANCED PRACTICE MIDWIFE

## 2019-05-28 PROCEDURE — 12000000 ZZH R&B MED SURG/OB

## 2019-05-28 PROCEDURE — 86900 BLOOD TYPING SEROLOGIC ABO: CPT | Performed by: ADVANCED PRACTICE MIDWIFE

## 2019-05-28 RX ORDER — CARBOPROST TROMETHAMINE 250 UG/ML
250 INJECTION, SOLUTION INTRAMUSCULAR
Status: DISCONTINUED | OUTPATIENT
Start: 2019-05-28 | End: 2019-05-30

## 2019-05-28 RX ORDER — SODIUM CHLORIDE, SODIUM LACTATE, POTASSIUM CHLORIDE, CALCIUM CHLORIDE 600; 310; 30; 20 MG/100ML; MG/100ML; MG/100ML; MG/100ML
INJECTION, SOLUTION INTRAVENOUS CONTINUOUS
Status: DISCONTINUED | OUTPATIENT
Start: 2019-05-28 | End: 2019-05-30

## 2019-05-28 RX ORDER — NICOTINE POLACRILEX 4 MG
15-30 LOZENGE BUCCAL
Status: DISCONTINUED | OUTPATIENT
Start: 2019-05-28 | End: 2019-05-31 | Stop reason: HOSPADM

## 2019-05-28 RX ORDER — FENTANYL CITRATE 50 UG/ML
50-100 INJECTION, SOLUTION INTRAMUSCULAR; INTRAVENOUS
Status: DISCONTINUED | OUTPATIENT
Start: 2019-05-28 | End: 2019-05-30

## 2019-05-28 RX ORDER — DEXTROSE MONOHYDRATE 25 G/50ML
25-50 INJECTION, SOLUTION INTRAVENOUS
Status: DISCONTINUED | OUTPATIENT
Start: 2019-05-28 | End: 2019-05-31 | Stop reason: HOSPADM

## 2019-05-28 RX ORDER — DEXTROSE MONOHYDRATE 25 G/50ML
25-50 INJECTION, SOLUTION INTRAVENOUS
Status: DISCONTINUED | OUTPATIENT
Start: 2019-05-28 | End: 2019-05-28

## 2019-05-28 RX ORDER — IBUPROFEN 400 MG/1
800 TABLET, FILM COATED ORAL
Status: DISCONTINUED | OUTPATIENT
Start: 2019-05-28 | End: 2019-05-30

## 2019-05-28 RX ORDER — METHYLERGONOVINE MALEATE 0.2 MG/ML
200 INJECTION INTRAVENOUS
Status: DISCONTINUED | OUTPATIENT
Start: 2019-05-28 | End: 2019-05-29

## 2019-05-28 RX ORDER — OXYTOCIN/0.9 % SODIUM CHLORIDE 30/500 ML
100-340 PLASTIC BAG, INJECTION (ML) INTRAVENOUS CONTINUOUS PRN
Status: DISCONTINUED | OUTPATIENT
Start: 2019-05-28 | End: 2019-05-30

## 2019-05-28 RX ORDER — MORPHINE SULFATE 10 MG/ML
10 INJECTION, SOLUTION INTRAMUSCULAR; INTRAVENOUS ONCE
Status: COMPLETED | OUTPATIENT
Start: 2019-05-28 | End: 2019-05-28

## 2019-05-28 RX ORDER — NICOTINE POLACRILEX 4 MG
15-30 LOZENGE BUCCAL
Status: DISCONTINUED | OUTPATIENT
Start: 2019-05-28 | End: 2019-05-28

## 2019-05-28 RX ORDER — NALOXONE HYDROCHLORIDE 0.4 MG/ML
.1-.4 INJECTION, SOLUTION INTRAMUSCULAR; INTRAVENOUS; SUBCUTANEOUS
Status: DISCONTINUED | OUTPATIENT
Start: 2019-05-28 | End: 2019-05-30

## 2019-05-28 RX ORDER — ACETAMINOPHEN 325 MG/1
650 TABLET ORAL EVERY 4 HOURS PRN
Status: DISCONTINUED | OUTPATIENT
Start: 2019-05-28 | End: 2019-05-29

## 2019-05-28 RX ORDER — ONDANSETRON 2 MG/ML
4 INJECTION INTRAMUSCULAR; INTRAVENOUS EVERY 6 HOURS PRN
Status: DISCONTINUED | OUTPATIENT
Start: 2019-05-28 | End: 2019-05-30

## 2019-05-28 RX ORDER — DEXTROSE, SODIUM CHLORIDE, SODIUM LACTATE, POTASSIUM CHLORIDE, AND CALCIUM CHLORIDE 5; .6; .31; .03; .02 G/100ML; G/100ML; G/100ML; G/100ML; G/100ML
INJECTION, SOLUTION INTRAVENOUS CONTINUOUS
Status: DISCONTINUED | OUTPATIENT
Start: 2019-05-28 | End: 2019-05-30

## 2019-05-28 RX ORDER — PROMETHAZINE HYDROCHLORIDE 25 MG/ML
12.5 INJECTION INTRAMUSCULAR; INTRAVENOUS ONCE
Status: COMPLETED | OUTPATIENT
Start: 2019-05-28 | End: 2019-05-28

## 2019-05-28 RX ORDER — SODIUM CHLORIDE 9 MG/ML
INJECTION, SOLUTION INTRAVENOUS CONTINUOUS
Status: DISCONTINUED | OUTPATIENT
Start: 2019-05-28 | End: 2019-05-30

## 2019-05-28 RX ORDER — OXYTOCIN 10 [USP'U]/ML
10 INJECTION, SOLUTION INTRAMUSCULAR; INTRAVENOUS
Status: DISCONTINUED | OUTPATIENT
Start: 2019-05-28 | End: 2019-05-30

## 2019-05-28 RX ORDER — OXYCODONE AND ACETAMINOPHEN 5; 325 MG/1; MG/1
1 TABLET ORAL
Status: DISCONTINUED | OUTPATIENT
Start: 2019-05-28 | End: 2019-05-29

## 2019-05-28 RX ADMIN — PROMETHAZINE HYDROCHLORIDE 12.5 MG: 25 INJECTION INTRAMUSCULAR; INTRAVENOUS at 19:31

## 2019-05-28 RX ADMIN — MORPHINE SULFATE 10 MG: 10 INJECTION INTRAVENOUS at 19:32

## 2019-05-28 RX ADMIN — SODIUM CHLORIDE, POTASSIUM CHLORIDE, SODIUM LACTATE AND CALCIUM CHLORIDE 1000 ML: 600; 310; 30; 20 INJECTION, SOLUTION INTRAVENOUS at 16:22

## 2019-05-28 NOTE — PROGRESS NOTES
Hospitalist consult received for peripartum mngt diet controlled type II diabetes. Discussed with nursing. No insulin use. In labor now.    - Plan QID AC HS glucose monitoring and NPO sliding scale.  - Anticipate will not need insulin or PO agent at discharge.  - Have asked nursing staff to page with glucose when available.    No formal consult performed, but will follow peripherally.    JoAnna Barthell, PA-C  Pager: [phone]  5/28/2019   2:42 PM

## 2019-05-28 NOTE — PROGRESS NOTES
BARTOLO PROGRESS NOTE    SUBJECTIVE: Sitting on birthing ball. Tried hydrotherapy earlier. Breathing hard through contractions. Edsub supportive at the bedside.     OBJECTIVE:  /70   Temp 98  F (36.7  C) (Temporal)   LMP 2018 (Exact Date)     Fetal heart tones: Baseline    Variability: Moderated  Accelerations: Present  Decelerations: Absent    Contractions: Pt is michael every 3-5 minutes, lasting  seconds and palpates moderate-strong    Cervix: 1.5/ 70% / -1, Vtx.   Estrada Score: 8    ROM: Not ruptured    Pitocin- None  Antibiotics- None  Cervical ripening: N/A    ASSESSMENT:  IUP @ 40w5d early labor and no progress   GBS- negative  Membranes intact  GDM  Hypothyroidism     PLAN:   Comfort measures prn   Pain medication: Discussed options for pain mgmt. Also gave option for therapeutic rest. Discussed nitrous oxide would be also be appropriate to try this early in labor.  Dick would like to continue expectant mgmt at this time. Will ask for nitrous oxide when she is ready.   Anticipate   Reevaluate in 2-4 hours/PRN    OMAR Awan CNM

## 2019-05-28 NOTE — TELEPHONE ENCOUNTER
Call to Dick to check on labor status at home. States ctxs are still 8-10 minutes apart, but feel stronger when they are occur. Difficulty hearing and understanding Dick; sounded out of breath the entire telephone call.  Positive FM, some vaginal spotting, negative LOF. Offered Dick option to come to [facility] now for evaluation or to stay at home a while longer. Dick opts to stay at home at this time. Discussed Tylenol, warm bath and resting between ctxs, if able.     Kendra NELSNO CNM

## 2019-05-28 NOTE — PROVIDER NOTIFICATION
05/28/19 1500   Provider Notification   Provider Name/Title Sreekanth MCFARLAND   Method of Notification Phone   Request Evaluate - Remote   Notification Reason Other (Comment)  (hydrotherapy)

## 2019-05-28 NOTE — TELEPHONE ENCOUNTER
"Received page from [facility] to call patient regarding contractions.  Called and spoke to Dick.  She states that she is having contractions every 10-12 minutes, lasting 30-60 seconds.  She states that that contractions started at 0320.  She states that the contractions are \"wrapping around\" her low abd and into her tailbone.  She states that she is able to breathe through and move through contractions at this time; is also able to talk through ctx.  +fetal movement; denies loss of fluid, vaginal bleeding, headache, upper quadrant abd pain, N/V.      Counseled on options:   1. Expectant management for 1-2 hours  2. Go to [facility] hospital for evaluation    Dick decided on expectant management at home for 1 hour; counseled on comfort measures, danger signs, signs and symptoms labor, call with any questions or concerns.      0650:  Called and spoke to Dick again.  She states that contractions are still every 10 minutes, lasting 1 minute.  She states that contractions are feeling stronger, but that she is still feeling comfortable at home.  She states that she is still able to breathe through contractions, but that she is now not able to move through contractions d/t \"more\" discomfort.  +fetal movement; denies loss of fluid, vaginal bleeding, signs and symptoms preeclampsia.     Counseled on options:   1. Expectant management 1-2 hours  2. Go to Critical access hospital for eval    Dick states that she would like to stay at home a bit longer and would like on call CNM to call her in 1-2 hours.  Counseled on comfort measures, danger signs, signs and symptoms labor, fetal movement counts, signs and symptoms preeclampsia.     Plan for CNM to call in 1-2 hours; will call with any questions/concerns/changes in status.     Faby ENLSON, BARTOLO  "

## 2019-05-28 NOTE — TELEPHONE ENCOUNTER
"Contractions started at 3 am which are 10 minutes apart.  40 weeks pregnant.  Patient is requesting to speak with on call MD Pérez.        Additional Information    Negative: Passed out (i.e., lost consciousness, collapsed and was not responding)    Negative: Shock suspected (e.g., cold/pale/clammy skin, too weak to stand, low BP, rapid pulse)    Negative: Difficult to awaken or acting confused (e.g., disoriented, slurred speech)    Negative: [1] SEVERE abdominal pain (e.g., excruciating) AND [2] constant AND [3] present > 1 hour    Negative: Severe bleeding (e.g., continuous red blood from vagina, or large blood clots)    Negative: Umbilical cord hanging out of the vagina (shiny, white, curled appearance, \"like telephone cord\")    Negative: Uncontrollable urge to push (i.e., feels like baby is coming out now)    Negative: Can see baby    Negative: Sounds like a life-threatening emergency to the triager    Negative: Pregnant < 37 weeks (i.e., )    Negative: [1] Uncertain delivery date AND [2] possibly pregnant < 37 weeks (i.e., )    Negative: [1] First baby (primipara) AND [2] contractions < 6 minutes apart  AND [3] present 2 hours    Negative: [1] History of prior delivery (multipara) AND [2] contractions < 10 minutes apart AND [3] present 1 hour    Negative: [1] History of rapid prior delivery AND [2] contractions < 10 minutes apart    Negative: [1] Leakage of fluid from vagina AND [2] green or brown in color    Negative: [1] Leakage of fluid from vagina AND [2] leakage started > 4 hours ago    Negative: Vaginal bleeding or spotting    Negative: Baby moving less today (e.g., kick count < 5 in 1 hour or < 10 in 2 hours)    Negative: Severe headache or headache that won't go away    Negative: New blurred vision or vision changes    Negative: MODERATE-SEVERE abdominal pain    Negative: Fever > 100.4 F (38.0 C)    Negative: [1] Leakage of fluid from vagina AND [2] leakage started < 4 hours ago    " Negative: Patient sounds very sick or weak to the triager    [1] First baby (primipara) AND [2] contractions > 5 minutes apart, or for < 2 hours    Protocols used: PREGNANCY - LABOR-A-AH

## 2019-05-28 NOTE — TELEPHONE ENCOUNTER
Pt states she has been talking with Kendra Stack and would like to go to the [facility]. Contractions are 5-10 mins apart. Could not talk between contractions. No vag bleeding or LOF. FM present. Kendra Stack notified. [facility] notified.    2

## 2019-05-28 NOTE — H&P
CNM Labor Admission History & Physical    Rocio Elkins is a 33 year old  with an IUP at 40w5d  [place]; partnered,   Partner/support Person: Dami  Language Barrier: English  Clinic: [facility]  Provider: BARTOLO's    Rocio Elkins is admitted to the Birth[facility] at [facility] on 2019 at 2:08 PM       History of present inllness/Chief Complaint:  Here with: spontaneous onset of labor. Has been michael since 0300  Patient reports contractions are Regular           Baby active Yes  Membranes are intact.  Bloody show No   Any changes with medical history since last prenatal visit No        Obstetrical history  Estimated Date of Delivery: May 23, 2019 determined by  Patient's last menstrual period was 2018 (exact date).   Dating U/S: 2018  Fetal anatomic survey: Normal  Placenta: Previa on fetal anatomy scan. Resolved by US done on 3/25/2019. Posterior    PRENATAL COURSE  Prenatal care began at 7 wks gestation for a total of 23 prenatal visits.  Total wt gain 11; There is no height or weight on file to calculate BMI.  Prenatal Blood Pressure: WNL  Prenatal course was   complicated by      Patient Active Problem List    Diagnosis Date Noted     Indication for care in labor or delivery 2019     Priority: Medium     Vaginal discharge during pregnancy in third trimester 2019     Priority: Medium     Diet controlled gestational diabetes mellitus (GDM) in third trimester 2019     Priority: Medium     Diabetic Ed consult ordered 19.    GDM Testing Recommendations:    [facility] Diabetes Education Referral:  **    Growth U/S at 32w:  **  Fetal kick counts beginning at 32w  Weekly BPP beginning at 40w (2x/week at 41w)    Induction of labor at 41w    PP Gtt 6-12w  FBG yearly afterwards                   Thrombocytopenia affecting pregnancy, antepartum (H) 2019     Priority: Medium     19: 144 - will recheck 4 weeks.       Anemia affecting pregnancy in  third trimester 2018     Priority: Medium     19: 10.1  10.6-2018       Hyperemesis gravidarum, antepartum 10/05/2018     Priority: Medium     Supervision of high risk pregnancy in third trimester 10/04/2018     Priority: Medium     **HR  FOB: Edsub  ART: May 19, 2019  O+ Posterior  Sex: Girl  Genetic: Declined      Tdap:          Flu:Dec           GBS: neg    Problems  F/U next visit: N&V going to [facility]. Hypothyroidism TSH 15.71 Endo following  1 hr GCT/Hgb: Failed 150, 10.1  Anemia: (19) 10.1  Thrombocytopenia: (19) 144  Redraw labs in 4 weeks.              LTBI (latent tuberculosis infection) 2013     Priority: Medium     Overview:   LTBI therapy (Rifampin x 4 mths) completed on 13 per [facility] (form scanned).  TB skin test on 7/3/03 showed a 19 mm reaction.  QuantiFERON blood test done 12 was Positive.  CXR- 13.  Lissa Salinas LPN  2013, 2:45 PM       Hypovitaminosis D 2008     Priority: Medium     Postablative hypothyroidism 2005     Priority: Medium     Overview:   S/p radioactive iodine treatment for Graves disease.  Per [facility] TSH q 4-6  Thyroid antibodies to r/o  graves disease    18: [facility] ultrasound: Growth appropriate for gestation. [facility] will perform growth ultrasound in 6 weeks       Thyrotoxic exophthalmos 2005     Priority: Medium     Tdap: 19  Rhogam: N/A    Patient Active Problem List   Diagnosis     LTBI (latent tuberculosis infection)     Postablative hypothyroidism     Thyrotoxic exophthalmos     Hypovitaminosis D     Supervision of high risk pregnancy in third trimester     Hyperemesis gravidarum, antepartum     Anemia affecting pregnancy in third trimester     Thrombocytopenia affecting pregnancy, antepartum (H)     Diet controlled gestational diabetes mellitus (GDM) in third trimester     Vaginal discharge during pregnancy in third trimester     Indication for care in labor or delivery        HISTORY  Allergies   Allergen Reactions     Pseudoephedrine Other (See Comments)     Dizziness, leg weakness  Other reaction(s): Other, see comments  Dizziness, leg weakness     Past Medical History:   Diagnosis Date     Diabetes (H)     Gestational diabetes diet controlled     Thyroid disease      Past Surgical History:   Procedure Laterality Date     NO HISTORY OF SURGERY       History reviewed. No pertinent family history.  Social History     Tobacco Use     Smoking status: Never Smoker     Smokeless tobacco: Never Used   Substance Use Topics     Alcohol use: No     OB History    Para Term  AB Living   1 0 0 0 0 0   SAB TAB Ectopic Multiple Live Births   0 0 0 0 0      # Outcome Date GA Lbr Mino/2nd Weight Sex Delivery Anes PTL Lv   1 Current                LABS:  Lab Results   Component Value Date    ABO O 2018    RH Pos 2018    AS Neg 2018    HGB 12.4 2019    HEPBANG Nonreactive 10/04/2018       GBS Status:   Lab Results   Component Value Date    GBS Negative 2019     Rubella: Immune    HIV: Non-Reactive   Platelets:  115 at 40 week PNV  1hr GCT:  Failed, Failed 3 hr gtt    ROS   Pt is alert and oriented  Pt denies significant constitutional symptoms including fever and/or malaise.    Pt denies significant respiratory, cardiovacular, GI, or muscular/skeletal complaints.    Neuro: Denies HA and visual changes  Muscoloskeletal: Denies except for discomforts r/t pregnancy     PHYSICAL EXAM:  LMP 2018 (Exact Date)   General appearance:  healthy, alert, active, mild distress and cooperative   Heart: RRR  Lungs: CTA bilaterally, normal respiratory effort  Abdomen: gravid, single vertex fetus, non-tender, EFW 7.5 lbs.       Contractions: Pt is michael every 3-5 minutes, lasting  seconds and palpates moderate-strong    Fetal heart tones: Baseline   Variability: moderate   Accelerations: present  Decelerations: absent    NST: reactive    Cervix: 1.5/ 70% /  Mid/ average/ -1, Vtx  Bloody show: yes, dark red/brown in color  Membranes:  Intact    ASSESSMENT:  33 year old  with olguin IUP 40w5d in early labor  NST reactive  GBS negative and membranes intact  Hypothyroidism  GDM: Diet controlled  Low plts (115) at 40wks.     PLAN:  Admit - see IP orders  Routine CNM care  Labs ordered: CBC, anti-treponema, T&H   Teaching done r/t comfort measures, pain management options, and labor processes.  Pain medication: Will reevaluate when requesting pain medication  Anticipate     Kendra Stack, OMAR CNMtt

## 2019-05-29 ENCOUNTER — ANESTHESIA (OUTPATIENT)
Dept: OBGYN | Facility: CLINIC | Age: 34
End: 2019-05-29
Payer: COMMERCIAL

## 2019-05-29 ENCOUNTER — ANESTHESIA EVENT (OUTPATIENT)
Dept: OBGYN | Facility: CLINIC | Age: 34
End: 2019-05-29
Payer: COMMERCIAL

## 2019-05-29 LAB
GLUCOSE BLDC GLUCOMTR-MCNC: 121 MG/DL (ref 70–99)
GLUCOSE BLDC GLUCOMTR-MCNC: 125 MG/DL (ref 70–99)
GLUCOSE BLDC GLUCOMTR-MCNC: 48 MG/DL (ref 70–99)
GLUCOSE BLDC GLUCOMTR-MCNC: 62 MG/DL (ref 70–99)
GLUCOSE BLDC GLUCOMTR-MCNC: 74 MG/DL (ref 70–99)
GLUCOSE BLDC GLUCOMTR-MCNC: 80 MG/DL (ref 70–99)
GLUCOSE BLDC GLUCOMTR-MCNC: 84 MG/DL (ref 70–99)
GLUCOSE BLDC GLUCOMTR-MCNC: 85 MG/DL (ref 70–99)
GLUCOSE BLDC GLUCOMTR-MCNC: 89 MG/DL (ref 70–99)
GLUCOSE BLDC GLUCOMTR-MCNC: 99 MG/DL (ref 70–99)
PLATELET # BLD AUTO: 106 10E9/L (ref 150–450)
T PALLIDUM AB SER QL: NONREACTIVE

## 2019-05-29 PROCEDURE — 99207 ZZC NON-BILLABLE SERV PER CHARTING: CPT | Performed by: NURSE PRACTITIONER

## 2019-05-29 PROCEDURE — 59025 FETAL NON-STRESS TEST: CPT | Mod: 26 | Performed by: ADVANCED PRACTICE MIDWIFE

## 2019-05-29 PROCEDURE — 3E0R3BZ INTRODUCTION OF ANESTHETIC AGENT INTO SPINAL CANAL, PERCUTANEOUS APPROACH: ICD-10-PCS | Performed by: ANESTHESIOLOGY

## 2019-05-29 PROCEDURE — 72200001 ZZH LABOR CARE VAGINAL DELIVERY SINGLE

## 2019-05-29 PROCEDURE — 25000125 ZZHC RX 250: Performed by: ADVANCED PRACTICE MIDWIFE

## 2019-05-29 PROCEDURE — 59300 EPISIOTOMY OR VAGINAL REPAIR: CPT | Performed by: OBSTETRICS & GYNECOLOGY

## 2019-05-29 PROCEDURE — 10907ZC DRAINAGE OF AMNIOTIC FLUID, THERAPEUTIC FROM PRODUCTS OF CONCEPTION, VIA NATURAL OR ARTIFICIAL OPENING: ICD-10-PCS | Performed by: ADVANCED PRACTICE MIDWIFE

## 2019-05-29 PROCEDURE — 85049 AUTOMATED PLATELET COUNT: CPT | Performed by: ADVANCED PRACTICE MIDWIFE

## 2019-05-29 PROCEDURE — 0DQR0ZZ REPAIR ANAL SPHINCTER, OPEN APPROACH: ICD-10-PCS | Performed by: OBSTETRICS & GYNECOLOGY

## 2019-05-29 PROCEDURE — 00HU33Z INSERTION OF INFUSION DEVICE INTO SPINAL CANAL, PERCUTANEOUS APPROACH: ICD-10-PCS | Performed by: ANESTHESIOLOGY

## 2019-05-29 PROCEDURE — 25000128 H RX IP 250 OP 636: Performed by: ANESTHESIOLOGY

## 2019-05-29 PROCEDURE — 37000011 ZZH ANESTHESIA WARD SERVICE

## 2019-05-29 PROCEDURE — 12000000 ZZH R&B MED SURG/OB

## 2019-05-29 PROCEDURE — 25000128 H RX IP 250 OP 636: Performed by: ADVANCED PRACTICE MIDWIFE

## 2019-05-29 PROCEDURE — 25800030 ZZH RX IP 258 OP 636: Performed by: ADVANCED PRACTICE MIDWIFE

## 2019-05-29 PROCEDURE — 59400 OBSTETRICAL CARE: CPT | Performed by: ADVANCED PRACTICE MIDWIFE

## 2019-05-29 PROCEDURE — 27110038 ZZH RX 271: Performed by: ANESTHESIOLOGY

## 2019-05-29 PROCEDURE — 00000146 ZZHCL STATISTIC GLUCOSE BY METER IP

## 2019-05-29 PROCEDURE — 36415 COLL VENOUS BLD VENIPUNCTURE: CPT | Performed by: ADVANCED PRACTICE MIDWIFE

## 2019-05-29 PROCEDURE — 25000132 ZZH RX MED GY IP 250 OP 250 PS 637: Performed by: ADVANCED PRACTICE MIDWIFE

## 2019-05-29 PROCEDURE — 25000125 ZZHC RX 250: Performed by: ANESTHESIOLOGY

## 2019-05-29 PROCEDURE — 0UQGXZZ REPAIR VAGINA, EXTERNAL APPROACH: ICD-10-PCS | Performed by: OBSTETRICS & GYNECOLOGY

## 2019-05-29 RX ORDER — AMOXICILLIN 250 MG
1 CAPSULE ORAL 2 TIMES DAILY
Status: DISCONTINUED | OUTPATIENT
Start: 2019-05-29 | End: 2019-05-31 | Stop reason: HOSPADM

## 2019-05-29 RX ORDER — AMOXICILLIN 250 MG
2 CAPSULE ORAL 2 TIMES DAILY
Status: DISCONTINUED | OUTPATIENT
Start: 2019-05-29 | End: 2019-05-31 | Stop reason: HOSPADM

## 2019-05-29 RX ORDER — ROPIVACAINE HYDROCHLORIDE 2 MG/ML
10 INJECTION, SOLUTION EPIDURAL; INFILTRATION; PERINEURAL ONCE
Status: DISCONTINUED | OUTPATIENT
Start: 2019-05-29 | End: 2019-05-30

## 2019-05-29 RX ORDER — ONDANSETRON 4 MG/1
4 TABLET, ORALLY DISINTEGRATING ORAL EVERY 6 HOURS PRN
Status: DISCONTINUED | OUTPATIENT
Start: 2019-05-29 | End: 2019-05-30

## 2019-05-29 RX ORDER — ACETAMINOPHEN 325 MG/1
650 TABLET ORAL EVERY 4 HOURS PRN
Status: DISCONTINUED | OUTPATIENT
Start: 2019-05-29 | End: 2019-05-31 | Stop reason: HOSPADM

## 2019-05-29 RX ORDER — EPHEDRINE SULFATE 50 MG/ML
5 INJECTION, SOLUTION INTRAMUSCULAR; INTRAVENOUS; SUBCUTANEOUS
Status: DISCONTINUED | OUTPATIENT
Start: 2019-05-29 | End: 2019-05-30

## 2019-05-29 RX ORDER — HYDROCORTISONE 2.5 %
CREAM (GRAM) TOPICAL 3 TIMES DAILY PRN
Status: DISCONTINUED | OUTPATIENT
Start: 2019-05-29 | End: 2019-05-31 | Stop reason: HOSPADM

## 2019-05-29 RX ORDER — IBUPROFEN 400 MG/1
800 TABLET, FILM COATED ORAL EVERY 6 HOURS PRN
Status: DISCONTINUED | OUTPATIENT
Start: 2019-05-29 | End: 2019-05-31 | Stop reason: HOSPADM

## 2019-05-29 RX ORDER — OXYTOCIN 10 [USP'U]/ML
10 INJECTION, SOLUTION INTRAMUSCULAR; INTRAVENOUS
Status: DISCONTINUED | OUTPATIENT
Start: 2019-05-29 | End: 2019-05-31 | Stop reason: HOSPADM

## 2019-05-29 RX ORDER — OXYTOCIN/0.9 % SODIUM CHLORIDE 30/500 ML
100 PLASTIC BAG, INJECTION (ML) INTRAVENOUS CONTINUOUS
Status: DISCONTINUED | OUTPATIENT
Start: 2019-05-29 | End: 2019-05-31 | Stop reason: HOSPADM

## 2019-05-29 RX ORDER — NALBUPHINE HYDROCHLORIDE 10 MG/ML
2.5-5 INJECTION, SOLUTION INTRAMUSCULAR; INTRAVENOUS; SUBCUTANEOUS EVERY 6 HOURS PRN
Status: DISCONTINUED | OUTPATIENT
Start: 2019-05-29 | End: 2019-05-30

## 2019-05-29 RX ORDER — NALOXONE HYDROCHLORIDE 0.4 MG/ML
.1-.4 INJECTION, SOLUTION INTRAMUSCULAR; INTRAVENOUS; SUBCUTANEOUS
Status: DISCONTINUED | OUTPATIENT
Start: 2019-05-29 | End: 2019-05-30

## 2019-05-29 RX ORDER — LANOLIN 100 %
OINTMENT (GRAM) TOPICAL
Status: DISCONTINUED | OUTPATIENT
Start: 2019-05-29 | End: 2019-05-31 | Stop reason: HOSPADM

## 2019-05-29 RX ORDER — OXYCODONE HYDROCHLORIDE 5 MG/1
5 TABLET ORAL EVERY 4 HOURS PRN
Status: DISCONTINUED | OUTPATIENT
Start: 2019-05-29 | End: 2019-05-31 | Stop reason: HOSPADM

## 2019-05-29 RX ORDER — LIDOCAINE 40 MG/G
CREAM TOPICAL
Status: DISCONTINUED | OUTPATIENT
Start: 2019-05-29 | End: 2019-05-30

## 2019-05-29 RX ORDER — FENTANYL CITRATE 50 UG/ML
100 INJECTION, SOLUTION INTRAMUSCULAR; INTRAVENOUS ONCE
Status: DISCONTINUED | OUTPATIENT
Start: 2019-05-29 | End: 2019-05-30

## 2019-05-29 RX ORDER — BUPIVACAINE HYDROCHLORIDE 2.5 MG/ML
6 INJECTION, SOLUTION EPIDURAL; INFILTRATION; INTRACAUDAL ONCE
Status: COMPLETED | OUTPATIENT
Start: 2019-05-29 | End: 2019-05-29

## 2019-05-29 RX ORDER — NALOXONE HYDROCHLORIDE 0.4 MG/ML
.1-.4 INJECTION, SOLUTION INTRAMUSCULAR; INTRAVENOUS; SUBCUTANEOUS
Status: DISCONTINUED | OUTPATIENT
Start: 2019-05-29 | End: 2019-05-31 | Stop reason: HOSPADM

## 2019-05-29 RX ORDER — MISOPROSTOL 200 UG/1
800 TABLET ORAL ONCE
Status: COMPLETED | OUTPATIENT
Start: 2019-05-29 | End: 2019-05-29

## 2019-05-29 RX ORDER — ROPIVACAINE HYDROCHLORIDE 2 MG/ML
INJECTION, SOLUTION EPIDURAL; INFILTRATION; PERINEURAL PRN
Status: DISCONTINUED | OUTPATIENT
Start: 2019-05-29 | End: 2019-05-30 | Stop reason: HOSPADM

## 2019-05-29 RX ORDER — ONDANSETRON 2 MG/ML
4 INJECTION INTRAMUSCULAR; INTRAVENOUS EVERY 6 HOURS PRN
Status: DISCONTINUED | OUTPATIENT
Start: 2019-05-29 | End: 2019-05-30

## 2019-05-29 RX ORDER — OXYTOCIN/0.9 % SODIUM CHLORIDE 30/500 ML
340 PLASTIC BAG, INJECTION (ML) INTRAVENOUS CONTINUOUS PRN
Status: DISCONTINUED | OUTPATIENT
Start: 2019-05-29 | End: 2019-05-31 | Stop reason: HOSPADM

## 2019-05-29 RX ORDER — FENTANYL CITRATE 50 UG/ML
INJECTION, SOLUTION INTRAMUSCULAR; INTRAVENOUS PRN
Status: DISCONTINUED | OUTPATIENT
Start: 2019-05-29 | End: 2019-05-30 | Stop reason: HOSPADM

## 2019-05-29 RX ORDER — LIDOCAINE HYDROCHLORIDE AND EPINEPHRINE 15; 5 MG/ML; UG/ML
INJECTION, SOLUTION EPIDURAL PRN
Status: DISCONTINUED | OUTPATIENT
Start: 2019-05-29 | End: 2019-05-30 | Stop reason: HOSPADM

## 2019-05-29 RX ORDER — BUPIVACAINE HYDROCHLORIDE 2.5 MG/ML
INJECTION, SOLUTION EPIDURAL; INFILTRATION; INTRACAUDAL
Status: COMPLETED
Start: 2019-05-29 | End: 2019-05-29

## 2019-05-29 RX ORDER — OXYTOCIN/0.9 % SODIUM CHLORIDE 30/500 ML
1-24 PLASTIC BAG, INJECTION (ML) INTRAVENOUS CONTINUOUS
Status: DISCONTINUED | OUTPATIENT
Start: 2019-05-29 | End: 2019-05-30

## 2019-05-29 RX ORDER — BISACODYL 10 MG
10 SUPPOSITORY, RECTAL RECTAL DAILY PRN
Status: DISCONTINUED | OUTPATIENT
Start: 2019-05-31 | End: 2019-05-31 | Stop reason: HOSPADM

## 2019-05-29 RX ADMIN — BUPIVACAINE HYDROCHLORIDE 6 ML: 2.5 INJECTION, SOLUTION EPIDURAL; INFILTRATION; INTRACAUDAL at 16:36

## 2019-05-29 RX ADMIN — Medication 12 ML/HR: at 03:30

## 2019-05-29 RX ADMIN — LIDOCAINE HYDROCHLORIDE,EPINEPHRINE BITARTRATE 3 ML: 15; .005 INJECTION, SOLUTION EPIDURAL; INFILTRATION; INTRACAUDAL; PERINEURAL at 03:24

## 2019-05-29 RX ADMIN — SODIUM CHLORIDE, POTASSIUM CHLORIDE, SODIUM LACTATE AND CALCIUM CHLORIDE: 600; 310; 30; 20 INJECTION, SOLUTION INTRAVENOUS at 04:49

## 2019-05-29 RX ADMIN — OXYTOCIN-SODIUM CHLORIDE 0.9% IV SOLN 30 UNIT/500ML 100 ML/HR: 30-0.9/5 SOLUTION at 22:50

## 2019-05-29 RX ADMIN — SODIUM CHLORIDE, SODIUM LACTATE, POTASSIUM CHLORIDE, CALCIUM CHLORIDE, AND DEXTROSE MONOHYDRATE: 600; 310; 30; 20; 5 INJECTION, SOLUTION INTRAVENOUS at 18:45

## 2019-05-29 RX ADMIN — SODIUM CHLORIDE, SODIUM LACTATE, POTASSIUM CHLORIDE, CALCIUM CHLORIDE, AND DEXTROSE MONOHYDRATE: 600; 310; 30; 20; 5 INJECTION, SOLUTION INTRAVENOUS at 09:00

## 2019-05-29 RX ADMIN — ROPIVACAINE HYDROCHLORIDE 5 ML: 2 INJECTION, SOLUTION EPIDURAL; INFILTRATION at 03:26

## 2019-05-29 RX ADMIN — ROPIVACAINE HYDROCHLORIDE 5 ML: 2 INJECTION, SOLUTION EPIDURAL; INFILTRATION at 03:29

## 2019-05-29 RX ADMIN — OXYTOCIN-SODIUM CHLORIDE 0.9% IV SOLN 30 UNIT/500ML 14 MILLI-UNITS/MIN: 30-0.9/5 SOLUTION at 20:50

## 2019-05-29 RX ADMIN — OXYTOCIN-SODIUM CHLORIDE 0.9% IV SOLN 30 UNIT/500ML 340 ML/HR: 30-0.9/5 SOLUTION at 21:19

## 2019-05-29 RX ADMIN — SODIUM CHLORIDE, POTASSIUM CHLORIDE, SODIUM LACTATE AND CALCIUM CHLORIDE 500 ML: 600; 310; 30; 20 INJECTION, SOLUTION INTRAVENOUS at 02:42

## 2019-05-29 RX ADMIN — OXYTOCIN-SODIUM CHLORIDE 0.9% IV SOLN 30 UNIT/500ML 1 MILLI-UNITS/MIN: 30-0.9/5 SOLUTION at 07:38

## 2019-05-29 RX ADMIN — MISOPROSTOL 800 MCG: 200 TABLET ORAL at 21:45

## 2019-05-29 RX ADMIN — FENTANYL CITRATE 100 MCG: 50 INJECTION, SOLUTION INTRAMUSCULAR; INTRAVENOUS at 03:26

## 2019-05-29 NOTE — PROGRESS NOTES
CNM PROGRESS NOTE    SUBJECTIVE: Crying with ctx's. Using nitrous oxide with minimal relief. Requesting medication for pain relief. When asked about sleep, reports has not slept since contractions began this morning at 0300.  at bedside remains supportive.     OBJECTIVE:  /71   Temp 98.4  F (36.9  C) (Temporal)   Resp 18   LMP 2018 (Exact Date)     Fetal heart tones: Baseline 135  Variability: Moderate  Accelerations: present  Decelerations: absent    Contractions: Pt is michael every 3-4 minutes, lasting  seconds and palpates moderate    Cervix: 3/ 70% / -1, at 1845 Vtx  ROM: Not ruptured    ASSESSMENT:  IUP @ 40w5d early labor, minimal progress   GBS- negative  GDM: Diet controlled  Membranes intact  Fatigued  Hypothyroidism     PLAN:   Comfort measures prn   Encouraged frequent position changes  Pain medication: Will try 10mg Morphine IM with 12.5 mg Phenergan IM for therapeutic rest and pain. R/B/A discussed. Verbal consent received.  Anticipate     Kendra Stack, APRN CNM

## 2019-05-29 NOTE — PROVIDER NOTIFICATION
This note also relates to the following rows which could not be included:  Dose (dre-units/min) Oxytocin - Cannot attach notes to extension rows  Rate (mL/hr) Oxytocin - Cannot attach notes to extension rows  Concentration Oxytocin - Cannot attach notes to extension rows       19 0845   Vaginal Exam   Method sterile exam per RN   Vaginal Bleeding not present   Cervical Position mid-position   Cervical Consistency soft   Cervical Dilation (cm) 5-6   Cervical Effacement 90%   Fetal Station -1   Uterine Activity Assessment   Method external tocotransducer   Fetal Assessment   Fetal HR Assessment Method external US   Fetal HR (beats/min) 130   Fetal Heart Baseline Rate normal range   Fetal HR Variability moderate (amplitude range 6 to 25 bpm)   Vitals   SpO2 96 %   Oximeter Heart Rate 62 bpm   Pain/Comfort   Patient Currently in Pain no   Comfort Measures   Comfort Measures Support person   Analgesia Side Effects Monitoring   Side Effects Monitoring: Respiratory Quality R   Side Effects Monitoring: Respiratory Depth N   Side Effects Monitoring: Sedation Level 1   Regional Anesthesia Motor Assessment   Motor Bilateral Unable to lift buttocks off bed   Motor Left Lower Extremity Able to bend ankle   Motor Right Lower Extremity Able to bend ankle   Regional Anesthesia Sensory Assessment   Dermatomes Left Xiphoid process   Dermatomes Right Xiphoid process    Labor    Labor No   Intrathecal/Epidural Catheter 19   Placement Date/Time: 19 0332   Type: Epidural   Site Assessment Clean, dry, intact   Local Anesthetic Toxicity   Local Anesthetic Systemic Toxicity Assessment WDL   Labor Care   Activity In Labor bedrest   Change of Maternal Position right side   Comfort Interventions additional pillows provided;quiet environment promoted   Support At Bedside spouse   Trust Relationship/Rapport care explained;choices provided;emotional support provided;empathic listening provided;questions  answered;questions encouraged;reassurance provided;thoughts/feelings acknowledged   Bladder Assessment bladder nonpalpable   Preeclampsia   Preeclampsia No   Labor Care Interventions   Labor Interventions fetal heart rate monitored   Fetal Wellbeing Promotion intake and output monitored;maternal position adjusted;uterine contraction activity assessed   Last PO Intake   Date of Last Liquid Consumption 05/29/19   Time of Last Liquid Consumption 0902

## 2019-05-29 NOTE — PROGRESS NOTES
Hospitalist Chart Check  5/29/2019  1400    Hospitalist consult received for peripartum mngt diet controlled type II diabetes. Discussed with nursing. No insulin use. Continues to be in labor now.     - Plan QID AC HS glucose monitoring and NPO sliding scale.  - Anticipate will not need insulin or PO agent at discharge.    Recent Labs   Lab 05/29/19  1240 05/29/19  1101 05/29/19  0838 05/29/19  0615 05/29/19  0352 05/28/19  2238 05/28/19  1445   GLC  --   --   --   --   --   --  79   BGM 62* 84 99 85 121* 74  --           Discussed with nursing, no concerns. No formal consult performed, Hospitalist will sign off.      OMAR Nunez [facility]  Hospitalist Service  Pager: [phone] (9a - 6t)

## 2019-05-29 NOTE — PROGRESS NOTES
CNM PROGRESS NOTE    SUBJECTIVE:  Feeling comfortable now, after epidural placement.     OBJECTIVE:  /65   Temp 98.9  F (37.2  C) (Temporal)   Resp 20   LMP 2018 (Exact Date)     Fetal heart tones: Baseline    Variability: Moderate  Accelerations: present  Decelerations: absent    Contractions: Pt is michael occasionally, lasting  seconds and palpates moderate-strong    Cervix: 4/ 80/ -1, Vtx  ROM:  Small amount of moderate meconium fluid, AROM at 0335    Pitocin- none  Antibiotics- none  Cervical ripening: N/A    ASSESSMENT:  IUP @ 40w6d early labor   GBS- negative  Membranes ruptured, moderate meconium GDM-Diet controlled  Hypothyroidism  Low plts (106) prior to epidural placement  Pain well controlled with epidural in place     PLAN:   Comfort measures prn   Pain medication: Epidural in place  Anticipate   Discussed AROM, verbal consent received    Reevaluate in 2-3 hours/PRN    OMAR Awan CNM

## 2019-05-29 NOTE — PROGRESS NOTES
"CNM PROGRESS NOTE    SUBJECTIVE:  Dick able to rest and sleep some since receiving Morphine/Phenergan combination at 1935. States ctxs are getting strong again, \"stronger than when I came here.\"  resting, remains supportive.     OBJECTIVE:  /65   Temp 98.9  F (37.2  C) (Temporal)   Resp 20   LMP 08/16/2018 (Exact Date)     Fetal heart tones: Baseline    Variability: Moderate  Accelerations: Present  Decelerations: Absent    Contractions: Pt is michael every 3-8 minutes, lasting 50-90 seconds and palpates moderate.    Cervix: Tight 4/ 80/ -1, Mid-position, at 0100. Vtx  ROM: Not ruptured, bulging bag    Pitocin- none  Antibiotics- none  Cervical ripening: N/A    ASSESSMENT:  IUP @ 40w6d early labor, minimal progress   GBS- negative  GDM-Diet controlled  Hypothyroidism     PLAN:   Discussed minimal cervical change on SVE  Discussed interventions and R/B/A  to help with labor progression, which included AROM vs SROM, beginning Pitocin infusion, or discharging home and returning when labor progresses naturally or on day of scheduled induction.   Discussed epidural R/B/A  Comfort measures prn   Pain medication: Dick opts for epidural now and will proceed with AROM  Verbal consent received  Stat plt ct needed before proceeding with epidural prep/placement  Will reevaluate after epidural placement and proceed with AROM.    Kendra Stack, OMAR CNM    "

## 2019-05-29 NOTE — PROGRESS NOTES
CNM PROGRESS NOTE    SUBJECTIVE:  Why don't babies come out faster? Feels like this is taking a long time. I feel well rested    OBJECTIVE:  BP 96/55   Temp (P) 97  F (36.1  C) (Temporal)   Resp 18   LMP 2018 (Exact Date)   SpO2 97%   Breastfeeding? No     Fetal heart tones: Baseline 140   Variability: moderate  Accelerations: present  Decelerations: absent    Contractions: Pt is michael every 2-4 minutes, lasting 50-60 seconds and palpates moderate    Cervix: no change   ROM: light meconium fluid     Pitocin- 8 mu/min.  Antibiotics- none      ASSESSMENT:  IUP @ 40w6d active labor   Minimal to no progress  GBS- negative  Epidural in place     PLAN:   Continue pitocin per protocol   Continue position changes to facilitate descent and dilation  Discussed normal length of labor, reviewed we like to see progress and that if there is no change in 2 hours likely will recommend internal monitor for strength of contractions, reviewed start of labor 1845, progress from 3 cm-5-6 cm over 16 hours  Anticipate   reevaluate in 2-4 hours/PRN    OMAR Busby CNM

## 2019-05-29 NOTE — PROGRESS NOTES
CNM PROGRESS NOTE    SUBJECTIVE:  Has been able to rest comfortably since epidural placement.  resting on couch in room.     OBJECTIVE:  BP 98/56   Temp 99.6  F (37.6  C) (Temporal)   Resp 20   LMP 08/16/2018 (Exact Date)   SpO2 94%     Fetal heart tones: Baseline    Variability:   Accelerations: present  Decelerations: absent    Contractions: Pt is having occasional ctxs lasting 60-90 seconds and palpates moderate    Cervix: 4/ 90% / -1, Vtx  ROM: Moderate meconium fluid    Pitocin- None  Antibiotics- None  Cervical ripening: N/A    ASSESSMENT:  IUP @ 40w6d early labor   GDM: Diet controlled  GBS- negative  Ruptured since 0335, meconium stained fluid.  Hypothyroidism  Low plts in labor (106)  Epidural providing adequate pain mgmt     PLAN:   Continue following antepartum GDM order set.    Comfort measures prn   Pain medication: Epidural in place  Labor augmentation with Pitocin. Discussed R/B/A. Verbal consent received to begin Pitocin infusion  Reevaluate in 2-4 hours/PRN      OMAR Awan CNM

## 2019-05-29 NOTE — PROVIDER NOTIFICATION
This note also relates to the following rows which could not be included:  Dose (dre-units/min) Oxytocin - Cannot attach notes to extension rows  Rate (mL/hr) Oxytocin - Cannot attach notes to extension rows  Concentration Oxytocin - Cannot attach notes to extension rows       05/29/19 1530   Vaginal Exam   Method sterile speculum exam per CNM   Cervical Dilation (cm) 9   Cervical Effacement 90%   Fetal Station 1   Vitals   Temp 98.9  F (37.2  C)   Temp src Temporal   SpO2 (!) 72 %   Oximeter Heart Rate 66 bpm   Labor Care   Activity In Labor bedrest   Labor Care Interventions   Labor Interventions Fetal monitoring/strip review every 15 mins   Provider Notification   Provider Name/Title Singh   Method of Notification At Bedside   Request Evaluate in Person   Notification Reason Labor Status;Uterine Activity;Status Update;SVE

## 2019-05-29 NOTE — ANESTHESIA PREPROCEDURE EVALUATION
Anesthesia Pre-Procedure Evaluation    Patient: Rocio Elkins   MRN: [id number] : 1985          Preoperative Diagnosis: * No surgery found *        Past Medical History:   Diagnosis Date     Diabetes (H)     Gestational diabetes diet controlled     Thyroid disease      Past Surgical History:   Procedure Laterality Date     NO HISTORY OF SURGERY       Allergies   Allergen Reactions     Pseudoephedrine Other (See Comments)     Dizziness, leg weakness  Other reaction(s): Other, see comments  Dizziness, leg weakness     Prior to Admission medications    Medication Sig Start Date End Date Taking? Authorizing Provider   blood glucose (ACCU-CHEK GUIDE) test strip Use to test blood sugar 4 times daily or as directed. 19 Yes Rachel Sky APRN CNM   blood glucose monitoring (ACCU-CHEK FASTCLIX) lancets Use to test blood sugar 4 times daily or as directed.  Ok to substitute alternative if insurance prefers. 19  Yes Rachel Sky APRN CNM   cholecalciferol (VITAMIN D3) 1000 UNIT tablet Take 1,000 Units by mouth 17  Yes Reported, Patient   ferrous gluconate (FERGON) 324 (38 Fe) MG tablet Take 1 tablet (324 mg) by mouth daily (with breakfast) 19  Yes Jevon Montero APRN CNM   levothyroxine (SYNTHROID/LEVOTHROID) 137 MCG tablet Take 1 tablet (137 mcg) by mouth daily 18  Yes Delia Mcdaniel MD   Prenatal Vit-Fe Fumarate-FA (PREPLUS) 27-1 MG TABS Take 1 tablet by mouth daily 3/19/19  Yes Jevon Montero APRN CNM   vitamin B-12 (CYANOCOBALAMIN) 1000 MCG tablet Take 1 tablet (1,000 mcg) by mouth daily 19  Yes Jevon Montero APRN CNM   vitamin C (ASCORBIC ACID) 250 MG tablet Take 1 tablet (250 mg) by mouth daily 19  Yes Jevon Montero APRN CNM   Blood Glucose Monitoring Suppl (ACCU-CHEK GUIDE) w/Device KIT 1 Device once for 1 dose 19  Rachel Sky APRN CNM     Anesthesia Evaluation     .             ROS/MED  "HX    ENT/Pulmonary:       Neurologic:       Cardiovascular:         METS/Exercise Tolerance:     Hematologic:     (+) Other Hematologic Disorder-thrombocytopenia      Musculoskeletal:         GI/Hepatic:         Renal/Genitourinary:         Endo:     (+) type II DM (gestational DM) thyroid problem hypothyroidism, .      Psychiatric:         Infectious Disease:         Malignancy:         Other:                                 Lab Results   Component Value Date    WBC 13.9 (H) 05/28/2019    HGB 14.1 05/28/2019    HCT 40.8 05/28/2019     (L) 05/29/2019     09/25/2018    POTASSIUM 3.6 09/25/2018    CHLORIDE 106 09/25/2018    CO2 21 09/25/2018    BUN 6 (L) 09/25/2018    CR 0.62 09/25/2018    GLC 79 05/28/2019    EDITH 9.5 09/25/2018    TSH 0.17 (L) 04/22/2019    T4 1.27 04/22/2019    HCGS Positive (A) 09/25/2018       Preop Vitals  BP Readings from Last 3 Encounters:   05/29/19 107/65   05/23/19 120/80   05/21/19 105/64    Pulse Readings from Last 3 Encounters:   12/28/18 100   12/21/18 91   12/13/18 93      Resp Readings from Last 3 Encounters:   05/28/19 20   05/21/19 18   12/28/18 20    SpO2 Readings from Last 3 Encounters:   12/21/18 100%   12/13/18 97%   11/16/18 98%      Temp Readings from Last 1 Encounters:   05/29/19 37.2  C (98.9  F) (Temporal)    Ht Readings from Last 1 Encounters:   03/28/19 1.676 m (5' 6\")      Wt Readings from Last 1 Encounters:   05/23/19 69.4 kg (153 lb)    Estimated body mass index is 24.69 kg/m  as calculated from the following:    Height as of 3/28/19: 1.676 m (5' 6\").    Weight as of 5/23/19: 69.4 kg (153 lb).       Anesthesia Plan      History & Physical Review      ASA Status:  2 .    NPO Status:  Unknown    Plan for Epidural          Postoperative Care      Consents  Anesthetic plan, risks, benefits and alternatives discussed with:  Patient..                 Elizabeth Reddy MD  "

## 2019-05-29 NOTE — PROGRESS NOTES
CNM PROGRESS NOTE    SUBJECTIVE:  I feel a lot of pain low and in my back. [place] like I could push? But I am not sure.    OBJECTIVE:  /58   Temp 98.9  F (37.2  C) (Temporal)   Resp 18   LMP 2018 (Exact Date)   SpO2 100%   Breastfeeding? No     Fetal heart tones: Baseline 140   Variability: moderate  Accelerations: present  Decelerations: absent    Contractions: Pt is michael every 2-5 minutes, lasting 50-60 seconds and palpates moderate    Cervix: 9.5/ 100% / 0/+1, Vtx  ROM: light meconium fluid     Pitocin- 12 mu/min.    ASSESSMENT:  IUP @ 40w6d good progress   GBS- negative     PLAN:   Epidural bolus due to patient discomfort  Anticipate second stage  Anticipate   Platelet count prior to pulling epidural, order placed  reevaluate in 2-4 hours/PRN    OMAR Busby CNMUKESH

## 2019-05-30 LAB
GLUCOSE BLDC GLUCOMTR-MCNC: 142 MG/DL (ref 70–99)
HGB BLD-MCNC: 10.1 G/DL (ref 11.7–15.7)
PLATELET # BLD AUTO: 137 10E9/L (ref 150–450)

## 2019-05-30 PROCEDURE — 85018 HEMOGLOBIN: CPT | Performed by: ADVANCED PRACTICE MIDWIFE

## 2019-05-30 PROCEDURE — 12000035 ZZH R&B POSTPARTUM

## 2019-05-30 PROCEDURE — 25000132 ZZH RX MED GY IP 250 OP 250 PS 637: Performed by: ADVANCED PRACTICE MIDWIFE

## 2019-05-30 PROCEDURE — 36415 COLL VENOUS BLD VENIPUNCTURE: CPT | Performed by: ADVANCED PRACTICE MIDWIFE

## 2019-05-30 PROCEDURE — 00000146 ZZHCL STATISTIC GLUCOSE BY METER IP

## 2019-05-30 PROCEDURE — 85049 AUTOMATED PLATELET COUNT: CPT | Performed by: ADVANCED PRACTICE MIDWIFE

## 2019-05-30 RX ORDER — LEVOTHYROXINE SODIUM 137 UG/1
137 TABLET ORAL DAILY
Status: DISCONTINUED | OUTPATIENT
Start: 2019-05-30 | End: 2019-05-31 | Stop reason: HOSPADM

## 2019-05-30 RX ADMIN — IBUPROFEN 800 MG: 400 TABLET ORAL at 15:41

## 2019-05-30 RX ADMIN — IBUPROFEN 800 MG: 400 TABLET ORAL at 08:31

## 2019-05-30 RX ADMIN — ACETAMINOPHEN 650 MG: 325 TABLET, FILM COATED ORAL at 15:41

## 2019-05-30 RX ADMIN — SENNOSIDES AND DOCUSATE SODIUM 1 TABLET: 8.6; 5 TABLET ORAL at 08:31

## 2019-05-30 RX ADMIN — ACETAMINOPHEN 650 MG: 325 TABLET, FILM COATED ORAL at 08:32

## 2019-05-30 RX ADMIN — IBUPROFEN 800 MG: 400 TABLET ORAL at 21:31

## 2019-05-30 RX ADMIN — ACETAMINOPHEN 650 MG: 325 TABLET, FILM COATED ORAL at 21:31

## 2019-05-30 RX ADMIN — SENNOSIDES AND DOCUSATE SODIUM 1 TABLET: 8.6; 5 TABLET ORAL at 21:32

## 2019-05-30 NOTE — LACTATION NOTE
Routine visit.   Breastfeeding general information reviewed.   Advised to breastfeed exclusively, on demand, avoid pacifiers, bottles and formula unless medically indicated.  Encouraged rooming in, skin to skin, feeding on demand 8-12x/day or sooner if baby cues.  Explained benefits of holding and skin to skin.  Encouraged lots of skin to skin. Instructed on hand expression.   Continues to nurse well  With shield per mom. No further questions at this time.   Will follow as needed.   Aydee Munoz BSN, RN, PHN, RNC-MNN, IBCLC

## 2019-05-30 NOTE — L&D DELIVERY NOTE
Delivery Summary    Rocio Elkins MRN# [id number]   Age: 33 year old YOB: 1985     Rocio began feeling pressure constantly around 1930 and was checked and found to be complete and +1/+2. We then began active pushing. She had a difficult time coordinating pushing efforts but with support of RN, KELLENM, and family was able   Delivery Note  IUP at 40 weeks gestation delivered on 2019.     delivery of a viable female infant. Weight pending at time of note  Apgars of 9 at 1 minute and 9 at 5 minutes.  Labor was spontaneous.  Active pushing time:  01 hours  46 minutes.  Medications administered  in labor:  Pain Rx theraputic sleep  and Epidural; Antibiotics No  Perineum: 3rd degree, Labial laceration and Vaginal / Sulcus, delayed cord clamping Yes; >3 minutes  Placenta-mechanism: spontaneous, intact,  with a 3 vessel cord. IV oxytocin was given. Rectal misoprostol was given.  QBL:[id number]  Complications of pregnancy, labor and delivery: Dysfunctional labor and Gestational Diabetes    Dr. Shah in house called to evaluate tearing, partial 3rd degree. See Dr. Shah's note for detailed description of repair.    Birth attendants: OMAR Maya CNM      ASSESSMENT & PLAN:     Routine Postpartum Cares  2 hr post delivery blood sugar  Postpartum hemorrhage        Labor Event Times    Labor onset date:  19 Onset time:   6:00 AM   Dilation complete date:  19 Complete time:   7:30 PM   Start pushing date/time:  2019 1930      Labor Events     labor?:  No   steroids:  None  Labor Type:  Spontaneous, Augmentation, AROM  Predominate monitoring during 1st stage:  continuous electronic fetal monitoring     Antibiotics received during labor?:  No     Rupture date/time: 19 0335   Rupture type:  Artificial Rupture of Membranes  Fluid color:  Meconium  Fluid odor:  Normal     Augmentation:  AROM     Delivery/Placenta Date and Time    Delivery Date:  19 Delivery  Time:   9:16 PM   Placenta Date/Time:  2019  9:22 PM  Oxytocin given at the time of delivery:  after delivery of baby     Vaginal Counts     Initial count performed by 2 team members:   Two Team Members   MIKE Lo CNM       [place] [employer] [place] [employer]   Initial counts       Added to count       Final counts       Placed during labor Accounted for at the end of labor   NA    NA    NA     Final count performed by 2 team members:   Two Team Members   MIKE Lo CNM      Final count correct?:  Yes     Apgars    Living status:  Living   1 Minute 5 Minute 10 Minute 15 Minute 20 Minute   Skin color: 1  1       Heart rate: 2  2       Reflex irritability: 2  2       Muscle tone: 2  2       Respiratory effort: 2  2       Total: 9  9       Apgars assigned by:  VEE LUO     Cord    Vessels:  3 Vessels Complications:  None   Cord Blood Disposition:  Lab Gases Sent?:  No      [place] Resuscitation    Output in Delivery Room:  Stool     Labor Events and Shoulder Dystocia    Fetal Tracing Prior to Delivery:  Category 1  Fetal Tracing Comments:  early decelerations prior to delivery  Shoulder dystocia present?:  Neg     Delivery (Maternal) (Provider to Complete) (708918)    Perineal lacerations:  3rd Repaired?:  Yes   Labial laceration:  right Repaired?:  No   Vaginal laceration?:  Yes Repaired?:  Yes   Number of repair packets:  3     Blood Loss  Mother: Dick Elkins #[id number]   Start of Mother's Information    IO Blood Loss  19 0600 - 19 2242    Total QBL Blood Loss (mL) Hospital Encounter 1198 mL    Total  1198 mL         End of Mother's Information  Mother: Dick Elkins #[id number]         Delivery - Provider to Complete ([id number])    Delivering clinician:  Vee Singh APRN CNM  CNM Care:  Exclusive CNM care in labor  Attempted Delivery Types (Choose all that apply):  Spontaneous Vaginal Delivery  Delivery Type (Choose the 1 that will  go to the Birth History):  Vaginal, Spontaneous   Other personnel:   Provider Role   Ethel Shah MD Gruver, MIKE Guerrero, OMAR Horvath CNM          Placenta    Delayed Cord Clamping:  Done  Date/Time:  5/29/2019  9:22 PM  Removal:  Spontaneous  Disposition:  Hospital disposal     Anesthesia    Method:  Epidural  Cervical dilation at placement:  4-7          Presentation and Position    Presentation:  Vertex  Position:  Right Occiput Anterior           OMAR Busby CNM

## 2019-05-30 NOTE — PROCEDURES
Procedure note  Called to check perineal laceration after delivery.  Placenta already delivered prior to my arrival.   Patient had partial 3rd degree laceration and right vaginal sulcus tear.     Used retractor and repaired in layers.   2-0 vicryl and 3-0 vicryl used.   Sphincter repaired.    Some uterine atony and uterus explored, clots removed.  Treated with rectal cytotec with good response.   Rectal intact

## 2019-05-30 NOTE — ANESTHESIA POSTPROCEDURE EVALUATION
"Patient: Rocio Elkins    * No procedures listed *    Diagnosis:* No pre-op diagnosis entered *  Diagnosis Additional Information: No value filed.    Anesthesia Type:  No value filed.    Note:  Anesthesia Post Evaluation    Patient location during evaluation: Bedside  Patient participation: Able to fully participate in evaluation  Level of consciousness: awake  Pain management: adequate  Airway patency: patent  Cardiovascular status: acceptable  Respiratory status: acceptable  Hydration status: acceptable  PONV: none     Anesthetic complications: None    Comments: Called to evaluate pt by RN. C/o pain in posterior thigh on left side. PPD1. Labor was long (~24hrs) with an hour of pushing. Epidural worked well throughout labor, but did need one re-dose. She currently is having difficulty ambulating without assistance and a \"sharp\" pain in the hamstring area from perineum to posterior knee. No discomfort blow the knee. Weakness is improving per pt. Earlier today she couldn't lift her left leg off of the bed, now she can with relative ease.     VSS    Strength: 5/5 with plantar and dorsiflexion bilaterally.                   5/5 with knee lift on Rt, 3/5 on left (pt reports limitation 2/2 pain)    Sensation: Intact bilaterally    Epidural insertion site - Clean and dry. NTTP    Most likely this is temporary neuropraxia from labor. With its early improvement in symptoms I feel that it will most likely resolve in a couple of days, but patient may need outpt PT. Discussed with her that as long as Sx aren't worsening, and no signs of epidural site infection, that I wouldn't recommend imaging of further evaluation.     Call with questions or concerns.   [phone]        Last vitals:  Vitals:    05/30/19 0435 05/30/19 0445 05/30/19 0720   BP: (!) 124/35 101/68 123/71   Pulse: 65 62 66   Resp: 18     Temp: 36.7  C (98  F)  36.8  C (98.3  F)   SpO2:            Electronically Signed By: Germain Calix DO, DO  May " 30, 2019  3:15 PM

## 2019-05-30 NOTE — PROGRESS NOTES
Dulce Harley CNM Progress Note: Postpartum Day #1    May 30, 2019  2:05 PM    SUBJECTIVE:  Patient is stable and is is not tolerating activity well, patient complains of left leg pain, weakness, numbness as well as difficulty ambulating  Baby is rooming in  Complications since 2 hours post delivery: none  Pain is well controlled.  Patient is taking pain medications.  Breastfeeding status:initiated   Elimination:  She is voiding without difficulty.  She has not had a bowel movement  Desired contraception Unsure  Denies heavy bleeding and passing large clots.  Feels good about birth experience.    OBJECTIVE:  /71   Pulse 66   Temp 98.3  F (36.8  C) (Oral)   Resp 18   LMP 08/16/2018 (Exact Date)   SpO2 100%   Breastfeeding? Unknown     Constitutional: healthy, alert and no distress    Breasts: Currently breastfeeding    Fundus: Uterine fundus is firm, non-tender and at 1FB below the level of the umbilicus    Perineum: Perineum is well approximated, minimal swelling    Lochia: Lochia is appropriate for the duration of time since delivery.     ASSESSMENT:  PPD #1  3rd degree laceration repair  Immediate PPH  Normal healing wound.  Left leg pain/weakness, difficulty ambulating    Hemoglobin   Date Value Ref Range Status   05/30/2019 10.1 (L) 11.7 - 15.7 g/dL Final   ]      PLAN:  Continue routine care  Ambulation encouraged  Breast feeding strategies discussed  Lactation consultation  Anesthesia consult  Physical therapy eval   Reviewed breastfeeding  Reviewed postpartum warning signs  Reviewed postpartum blues and postpartum depression warning signs  Plans unsure for contraception postpartum  Anticipated discharge 6/1/19        OMAR Richardson CNM

## 2019-05-31 VITALS
HEART RATE: 110 BPM | OXYGEN SATURATION: 100 % | RESPIRATION RATE: 16 BRPM | DIASTOLIC BLOOD PRESSURE: 67 MMHG | SYSTOLIC BLOOD PRESSURE: 100 MMHG | TEMPERATURE: 97.8 F

## 2019-05-31 PROCEDURE — 25000132 ZZH RX MED GY IP 250 OP 250 PS 637: Performed by: ADVANCED PRACTICE MIDWIFE

## 2019-05-31 PROCEDURE — 40000893 ZZH STATISTIC PT IP EVAL DEFER

## 2019-05-31 RX ORDER — DOCUSATE SODIUM 100 MG/1
100 CAPSULE, LIQUID FILLED ORAL 2 TIMES DAILY PRN
Qty: 90 CAPSULE | Refills: 0 | Status: SHIPPED | OUTPATIENT
Start: 2019-05-31 | End: 2019-07-12

## 2019-05-31 RX ORDER — BREAST PUMP
1 EACH MISCELLANEOUS ONCE
Qty: 1 EACH | Refills: 0 | Status: SHIPPED | OUTPATIENT
Start: 2019-05-31 | End: 2019-07-12

## 2019-05-31 RX ORDER — IBUPROFEN 800 MG/1
800 TABLET, FILM COATED ORAL EVERY 6 HOURS PRN
Qty: 60 TABLET | Refills: 0 | Status: SHIPPED | OUTPATIENT
Start: 2019-05-31 | End: 2019-07-12

## 2019-05-31 RX ORDER — ACETAMINOPHEN 325 MG/1
650-950 TABLET ORAL EVERY 6 HOURS PRN
Qty: 90 TABLET | Refills: 0 | Status: SHIPPED | OUTPATIENT
Start: 2019-05-31 | End: 2019-07-12

## 2019-05-31 RX ADMIN — ACETAMINOPHEN 650 MG: 325 TABLET, FILM COATED ORAL at 04:33

## 2019-05-31 RX ADMIN — LEVOTHYROXINE SODIUM 137 MCG: 137 TABLET ORAL at 08:05

## 2019-05-31 RX ADMIN — IBUPROFEN 800 MG: 400 TABLET ORAL at 10:41

## 2019-05-31 RX ADMIN — ACETAMINOPHEN 650 MG: 325 TABLET, FILM COATED ORAL at 10:41

## 2019-05-31 RX ADMIN — IBUPROFEN 800 MG: 400 TABLET ORAL at 04:33

## 2019-05-31 RX ADMIN — SENNOSIDES AND DOCUSATE SODIUM 1 TABLET: 8.6; 5 TABLET ORAL at 08:06

## 2019-05-31 NOTE — PROGRESS NOTES
CNM Postpartum Discharge Note    SIGNIFICANT PROBLEMS:  Patient Active Problem List   Diagnosis     LTBI (latent tuberculosis infection)     Postablative hypothyroidism     Thyrotoxic exophthalmos     Hypovitaminosis D     Supervision of high risk pregnancy in third trimester     Hyperemesis gravidarum, antepartum     Anemia affecting pregnancy in third trimester     Thrombocytopenia affecting pregnancy, antepartum (H)     Diet controlled gestational diabetes mellitus (GDM) in third trimester     Vaginal discharge during pregnancy in third trimester      (normal spontaneous vaginal delivery)     Other immediate postpartum hemorrhage     Third degree perineal laceration during delivery           SUBJECTIVE:  Patient is stable and is tolerating acitivity well, was having trouble walking and getting out of bed yesterday due to some left leg pain/soreness. Dick reports it is greatly improved today. She is ambulating independently.   Baby is rooming in  Complications since 2 hours post delivery: difficult ambulation  Pain is well controlled.  Patient is taking pain medications.  Breastfeeding status:well established and using shield   Elimination:  She is voiding without difficulty.  She has had a bowel movement  Desired contraception Unsure  Denies heavy bleeding and passing large clots.  Happy things are improved with her leg pain, thinking she was over extended with positioning during labor but it is tolerable, glad to be going home. Thankful for everything!    INTERVAL HISTORY:  /61   Pulse 110   Temp 97.7  F (36.5  C)   Resp 16   LMP 2018 (Exact Date)   SpO2 100%   Breastfeeding? Unknown     Constitutional: healthy, alert and no distress    Breasts: Currently breastfeeding    Fundus: Uterine fundus is firm, non-tender and at -2 below the level of the umbilicus    Perineum: Perineum is well approximated, minimal swelling    Lochia: Lochia is appropriate for the duration of time since delivery.      Postpartum hemoglobin   Hemoglobin   Date Value Ref Range Status   2019 10.1 (L) 11.7 - 15.7 g/dL Final     Blood type   Lab Results   Component Value Date    ABO O 2019       Lab Results   Component Value Date    RH Pos 2019     Rubella status No results found for: RUBELLAABIGG  History of depression:  no    ASSESSMENT/PLAN:  Normal postpartum course  Stable Post-partum day #2  Complications:breastfeeding difficulties continue with shield and can place lactation referral outpatient if needed, hx of gestational diabetes, plan for 2 hour OGTT at 6 wks postpartum and hx of hypothyroidism, her synthroid dose has not been adjusted, plan to recheck TSH at 6 wks postpartum  Discussed s/sx of anemia, reviewed okay to stop iron due to being asymptomatic and above 10, will plan for check at 6 weeks PP  Postpartum warning s/s reviewed, including bleeding/clots, fever, mastitis & thromboemboli   Exercise, diet and rest reviewed  PP Kegels/crunches reviewed  Continue prenatal vitamins while breastfeeding  Birthcontrol planned:Undecided. Fertility and contraception options reviewed.  Educated on postpartum blues and postpartum depression warnings signs/symptoms  2 week phone call follow-up to be done by delivering CNM  Follow-up in 6 weeks with CNMs at [facility]  Plan d/c home today    Current Discharge Medication List      START taking these medications    Details   acetaminophen (TYLENOL) 325 MG tablet Take 2-3 tablets (650-975 mg) by mouth every 6 hours as needed for mild pain or fever (greater than or equal to 38  C /100.4  F (oral) or 38.5  C/ 101.4  F (core).)  Qty: 90 tablet, Refills: 0    Associated Diagnoses:  (normal spontaneous vaginal delivery); Third degree perineal laceration during delivery, unspecified subtype      docusate sodium (COLACE) 100 MG capsule Take 1 capsule (100 mg) by mouth 2 times daily as needed for constipation  Qty: 90 capsule, Refills: 0     Associated Diagnoses:  (normal spontaneous vaginal delivery); Third degree perineal laceration during delivery, unspecified subtype      ibuprofen (ADVIL/MOTRIN) 800 MG tablet Take 1 tablet (800 mg) by mouth every 6 hours as needed for other (cramping)  Qty: 60 tablet, Refills: 0    Associated Diagnoses:  (normal spontaneous vaginal delivery); Third degree perineal laceration during delivery, unspecified subtype         CONTINUE these medications which have NOT CHANGED    Details   cholecalciferol (VITAMIN D3) 1000 UNIT tablet Take 1,000 Units by mouth      levothyroxine (SYNTHROID/LEVOTHROID) 137 MCG tablet Take 1 tablet (137 mcg) by mouth daily  Qty: 90 tablet, Refills: 3    Associated Diagnoses: Postablative hypothyroidism      Prenatal Vit-Fe Fumarate-FA (PREPLUS) 27-1 MG TABS Take 1 tablet by mouth daily  Qty: 90 tablet, Refills: 1    Comments: How ever dispensed is fine to adjust for 3 month fill  Associated Diagnoses: High-risk pregnancy         STOP taking these medications       blood glucose (ACCU-CHEK GUIDE) test strip Comments:   Reason for Stopping:         blood glucose monitoring (ACCU-CHEK FASTCLIX) lancets Comments:   Reason for Stopping:         Blood Glucose Monitoring Suppl (ACCU-CHEK GUIDE) w/Device KIT Comments:   Reason for Stopping:         ferrous gluconate (FERGON) 324 (38 Fe) MG tablet Comments:   Reason for Stopping:         vitamin B-12 (CYANOCOBALAMIN) 1000 MCG tablet Comments:   Reason for Stopping:         vitamin C (ASCORBIC ACID) 250 MG tablet Comments:   Reason for Stopping:               OMAR Busby CNM

## 2019-05-31 NOTE — DISCHARGE SUMMARY
[facility]    Discharge Summary  Obstetrics    Date of Admission:  2019  Date of Discharge:  2019  Discharging Provider: Vee Singh  Date of Service (when I saw the patient): 19    Discharge Diagnoses     Postpartum hemorrhage  Third degree laceration  GDM diet controlled  Hypothyroid    History of Present Illness   Rocio Elkins is a 33 year old female who presented with spontaneous labor.    Hospital Course   The patient's hospital course was unremarkable.  She recovered as anticipated and experienced some left leg pain/soreness and difficulty ambulating yesterday which is now greatly improved. On discharge, her pain was well controlled. Vaginal bleeding is stable.  Voiding without difficulty.  Ambulating well and tolerating a normal diet.  No fever.  Breastfeeding well.  Infant is stable. Anemia 10.1 after postpartum hemorrhage, patient is asymptomatic.  She was discharged on post-partum day #2.    Post-partum hemoglobin:   Hemoglobin   Date Value Ref Range Status   2019 10.1 (L) 11.7 - 15.7 g/dL Final       Vee Singh    Discharge Disposition   Discharged to home   Condition at discharge: Stable    Primary Care Physician   Physician No Ref-Primary    Consultations This Hospital Stay   HOSPITALIST IP CONSULT  ANESTHESIOLOGY IP CONSULT  PHYSICAL THERAPY ADULT IP CONSULT  ANESTHESIOLOGY IP CONSULT  HOME CARE POST PARTUM/ IP CONSULT  LACTATION IP CONSULT    Discharge Orders      Activity    Review discharge instructions     Reason for your hospital stay    Maternity care     Follow Up and recommended labs and tests    Follow up with me,  Vee Singh, in 6 weeks for routine postpartum visit. The following labs/tests are recommended: 2 hr glucose/hemoglobin/TSH.     Discharge Instructions - Postpartum visit    Schedule postpartum visit with your provider and return to clinic in 6 weeks.     Discharge Instructions - Gestational diabetic  patients    Gestational diabetic patients to follow up for fasting blood sugar and 2 hour 75gm glucose load at 6 weeks postpartum.     Diet    Resume previous diet     Discharge Medications   Current Discharge Medication List      START taking these medications    Details   acetaminophen (TYLENOL) 325 MG tablet Take 2-3 tablets (650-975 mg) by mouth every 6 hours as needed for mild pain or fever (greater than or equal to 38  C /100.4  F (oral) or 38.5  C/ 101.4  F (core).)  Qty: 90 tablet, Refills: 0    Associated Diagnoses:  (normal spontaneous vaginal delivery); Third degree perineal laceration during delivery, unspecified subtype      docusate sodium (COLACE) 100 MG capsule Take 1 capsule (100 mg) by mouth 2 times daily as needed for constipation  Qty: 90 capsule, Refills: 0    Associated Diagnoses:  (normal spontaneous vaginal delivery); Third degree perineal laceration during delivery, unspecified subtype      ibuprofen (ADVIL/MOTRIN) 800 MG tablet Take 1 tablet (800 mg) by mouth every 6 hours as needed for other (cramping)  Qty: 60 tablet, Refills: 0    Associated Diagnoses:  (normal spontaneous vaginal delivery); Third degree perineal laceration during delivery, unspecified subtype         CONTINUE these medications which have NOT CHANGED    Details   cholecalciferol (VITAMIN D3) 1000 UNIT tablet Take 1,000 Units by mouth      levothyroxine (SYNTHROID/LEVOTHROID) 137 MCG tablet Take 1 tablet (137 mcg) by mouth daily  Qty: 90 tablet, Refills: 3    Associated Diagnoses: Postablative hypothyroidism      Prenatal Vit-Fe Fumarate-FA (PREPLUS) 27-1 MG TABS Take 1 tablet by mouth daily  Qty: 90 tablet, Refills: 1    Comments: How ever dispensed is fine to adjust for 3 month fill  Associated Diagnoses: High-risk pregnancy         STOP taking these medications       blood glucose (ACCU-CHEK GUIDE) test strip Comments:   Reason for Stopping:         blood glucose monitoring (ACCU-CHEK FASTCLIX) lancets  Comments:   Reason for Stopping:         Blood Glucose Monitoring Suppl (ACCU-CHEK GUIDE) w/Device KIT Comments:   Reason for Stopping:         ferrous gluconate (FERGON) 324 (38 Fe) MG tablet Comments:   Reason for Stopping:         vitamin B-12 (CYANOCOBALAMIN) 1000 MCG tablet Comments:   Reason for Stopping:         vitamin C (ASCORBIC ACID) 250 MG tablet Comments:   Reason for Stopping:             Allergies   Allergies   Allergen Reactions     Pseudoephedrine Other (See Comments)     Dizziness, leg weakness  Other reaction(s): Other, see comments  Dizziness, leg weakness

## 2019-05-31 NOTE — LACTATION NOTE
Routine visit with Dick and baby.    Baby latched on well to the right breast with shield.  Nutritive suckling pattern noted and mother reports gtts in shield.  Getting ready for discharge.  Plan: Watch for feeding cues and feed every 2-3 hours and/or on demand. Continue to use feeding log to track intake and appropriate voids and stools. Take feeding log to first follow up appointment or weight check. Encourage skin to skin to promote frequent feedings, thermoregulation and bonding. Follow-up with healthcare provider or lactation consultant for questions or concerns.    Continues to nurse well per mom. No further questions at this time.   Will follow as needed.   Aydee Munoz BSN, RN, PHN, RNC-MNN, IBCLC

## 2019-06-01 ENCOUNTER — NURSE TRIAGE (OUTPATIENT)
Dept: NURSING | Facility: CLINIC | Age: 34
End: 2019-06-01

## 2019-06-01 NOTE — TELEPHONE ENCOUNTER
Patient is calling regarding  with questions on breastfeeding. See chart Female [employer] Birthday [birth year].    Aga Herman RN  [facility]

## 2019-06-02 ENCOUNTER — LACTATION ENCOUNTER (OUTPATIENT)
Age: 34
End: 2019-06-02

## 2019-06-02 NOTE — LACTATION NOTE
This note was copied from a baby's chart.  Routine visit with Rocio.  Discharged on Friday. Baby had been breast feeding well in hospital with gtts seen in shield.  no wet diapers seen at home for multiple hours and baby readmitted for dehydration.Plan is to breastfeed with shield and then pump and hand express for 2 minutes each breast after pumping.  LC physically instructed mother on hand expression.  gtts seen from the left breast.  Discussed mother's nutrition/hydration. disscussed supplement I.e. Fenugreek, Go-Lacta and mother milk plus .    Per RN baby latched and transferred 6ml and is supplementing with bottle.  No further questions at this time.  Will follow as needed.  Aydee MARRERON, RN, PHN, RNC-MNN, IBCLC

## 2019-06-03 ENCOUNTER — LACTATION ENCOUNTER (OUTPATIENT)
Age: 34
End: 2019-06-03

## 2019-06-03 NOTE — LACTATION NOTE
This note was copied from a baby's chart.  Follow up visit.  Infant has been doing better with feeding.  Breast feeding and taking more from breast.  Rocio is pumping more today, up to 15 ml.  Encouraged her to continue pumping after every feeding.  She is working on drinking more water.  Suggested she use the shield when breast feeding.  RN reports baby is sleepier with breast feeding.  She is now able to bottle full amount.  Encouraged Rocio to keep working on breast feeding and to bottle feed after.  She is hoping baby is more interested as her supply increases.  Will continue to follow.  Vee Park  RN, IBCLC

## 2019-06-07 ENCOUNTER — TELEPHONE (OUTPATIENT)
Dept: MIDWIFE SERVICES | Facility: CLINIC | Age: 34
End: 2019-06-07

## 2019-06-07 NOTE — TELEPHONE ENCOUNTER
Called back- reviewed her discharge instructions given to her on day of discharge-  Pt was then transferred to scheduling for her appt and labs      Discharge instructions from in patient stay 5/31/19  Follow Up and recommended labs and tests     Follow up with me,  Vee Singh, in 6 weeks for routine postpartum visit. The following labs/tests are recommended: 2 hr glucose/hemoglobin/TSH.          Discharge Instructions - Postpartum visit     Schedule postpartum visit with your provider and return to clinic in 6 weeks.          Discharge Instructions - Gestational diabetic patients     Gestational diabetic patients to follow up for fasting blood sugar and 2 hour 75gm glucose load at 6 weeks postpartum.

## 2019-06-07 NOTE — TELEPHONE ENCOUNTER
Patient was recently discharged and has a question about when she should schedule her next appointment..Please call her on her cell phone today.

## 2019-06-08 ENCOUNTER — TELEPHONE (OUTPATIENT)
Dept: MIDWIFE SERVICES | Facility: CLINIC | Age: 34
End: 2019-06-08

## 2019-06-08 ENCOUNTER — HOSPITAL ENCOUNTER (EMERGENCY)
Facility: CLINIC | Age: 34
Discharge: HOME | End: 2019-06-08
Attending: EMERGENCY MEDICINE | Admitting: EMERGENCY MEDICINE
Payer: COMMERCIAL

## 2019-06-08 ENCOUNTER — NURSE TRIAGE (OUTPATIENT)
Dept: NURSING | Facility: CLINIC | Age: 34
End: 2019-06-08

## 2019-06-08 ENCOUNTER — APPOINTMENT (OUTPATIENT)
Dept: CT IMAGING | Facility: CLINIC | Age: 34
End: 2019-06-08
Attending: EMERGENCY MEDICINE
Payer: COMMERCIAL

## 2019-06-08 ENCOUNTER — APPOINTMENT (OUTPATIENT)
Dept: ULTRASOUND IMAGING | Facility: CLINIC | Age: 34
End: 2019-06-08
Attending: EMERGENCY MEDICINE
Payer: COMMERCIAL

## 2019-06-08 VITALS
OXYGEN SATURATION: 100 % | HEIGHT: 67 IN | BODY MASS INDEX: 22.29 KG/M2 | TEMPERATURE: 98.3 F | SYSTOLIC BLOOD PRESSURE: 108 MMHG | RESPIRATION RATE: 16 BRPM | DIASTOLIC BLOOD PRESSURE: 73 MMHG | HEART RATE: 89 BPM | WEIGHT: 142 LBS

## 2019-06-08 DIAGNOSIS — N30.00 ACUTE CYSTITIS WITHOUT HEMATURIA: ICD-10-CM

## 2019-06-08 LAB
ALBUMIN UR-MCNC: NEGATIVE MG/DL
APPEARANCE UR: CLEAR
BACTERIA #/AREA URNS HPF: ABNORMAL /HPF
BASOPHILS # BLD AUTO: 0 10E9/L (ref 0–0.2)
BASOPHILS NFR BLD AUTO: 0.1 %
BILIRUB UR QL STRIP: NEGATIVE
COLOR UR AUTO: ABNORMAL
CRP SERPL-MCNC: 14.4 MG/L (ref 0–8)
DIFFERENTIAL METHOD BLD: ABNORMAL
EOSINOPHIL # BLD AUTO: 0.1 10E9/L (ref 0–0.7)
EOSINOPHIL NFR BLD AUTO: 0.8 %
ERYTHROCYTE [DISTWIDTH] IN BLOOD BY AUTOMATED COUNT: 15.6 % (ref 10–15)
GLUCOSE UR STRIP-MCNC: NEGATIVE MG/DL
HCT VFR BLD AUTO: 30.4 % (ref 35–47)
HGB BLD-MCNC: 10.1 G/DL (ref 11.7–15.7)
HGB UR QL STRIP: ABNORMAL
IMM GRANULOCYTES # BLD: 0.1 10E9/L (ref 0–0.4)
IMM GRANULOCYTES NFR BLD: 0.3 %
KETONES UR STRIP-MCNC: NEGATIVE MG/DL
LEUKOCYTE ESTERASE UR QL STRIP: ABNORMAL
LYMPHOCYTES # BLD AUTO: 1.2 10E9/L (ref 0.8–5.3)
LYMPHOCYTES NFR BLD AUTO: 7.9 %
MCH RBC QN AUTO: 30.1 PG (ref 26.5–33)
MCHC RBC AUTO-ENTMCNC: 33.2 G/DL (ref 31.5–36.5)
MCV RBC AUTO: 91 FL (ref 78–100)
MONOCYTES # BLD AUTO: 0.5 10E9/L (ref 0–1.3)
MONOCYTES NFR BLD AUTO: 3.3 %
NEUTROPHILS # BLD AUTO: 13.6 10E9/L (ref 1.6–8.3)
NEUTROPHILS NFR BLD AUTO: 87.6 %
NITRATE UR QL: NEGATIVE
NRBC # BLD AUTO: 0 10*3/UL
NRBC BLD AUTO-RTO: 0 /100
PH UR STRIP: 6.5 PH (ref 5–7)
PLATELET # BLD AUTO: 297 10E9/L (ref 150–450)
RBC # BLD AUTO: 3.35 10E12/L (ref 3.8–5.2)
RBC #/AREA URNS AUTO: 3 /HPF (ref 0–2)
SOURCE: ABNORMAL
SP GR UR STRIP: 1.01 (ref 1–1.03)
SQUAMOUS #/AREA URNS AUTO: 1 /HPF (ref 0–1)
UROBILINOGEN UR STRIP-MCNC: NORMAL MG/DL (ref 0–2)
WBC # BLD AUTO: 15.5 10E9/L (ref 4–11)
WBC #/AREA URNS AUTO: 79 /HPF (ref 0–5)

## 2019-06-08 PROCEDURE — 74177 CT ABD & PELVIS W/CONTRAST: CPT

## 2019-06-08 PROCEDURE — 85025 COMPLETE CBC W/AUTO DIFF WBC: CPT | Performed by: EMERGENCY MEDICINE

## 2019-06-08 PROCEDURE — 25000128 H RX IP 250 OP 636: Performed by: EMERGENCY MEDICINE

## 2019-06-08 PROCEDURE — 86140 C-REACTIVE PROTEIN: CPT | Performed by: EMERGENCY MEDICINE

## 2019-06-08 PROCEDURE — 96375 TX/PRO/DX INJ NEW DRUG ADDON: CPT

## 2019-06-08 PROCEDURE — 81001 URINALYSIS AUTO W/SCOPE: CPT | Performed by: EMERGENCY MEDICINE

## 2019-06-08 PROCEDURE — 96365 THER/PROPH/DIAG IV INF INIT: CPT | Mod: 59

## 2019-06-08 PROCEDURE — 76857 US EXAM PELVIC LIMITED: CPT

## 2019-06-08 PROCEDURE — 87086 URINE CULTURE/COLONY COUNT: CPT | Performed by: EMERGENCY MEDICINE

## 2019-06-08 PROCEDURE — 96361 HYDRATE IV INFUSION ADD-ON: CPT

## 2019-06-08 PROCEDURE — 99285 EMERGENCY DEPT VISIT HI MDM: CPT | Mod: 25

## 2019-06-08 PROCEDURE — 96366 THER/PROPH/DIAG IV INF ADDON: CPT

## 2019-06-08 PROCEDURE — 25000125 ZZHC RX 250: Performed by: EMERGENCY MEDICINE

## 2019-06-08 RX ORDER — HYDROMORPHONE HYDROCHLORIDE 1 MG/ML
0.5 INJECTION, SOLUTION INTRAMUSCULAR; INTRAVENOUS; SUBCUTANEOUS
Status: COMPLETED | OUTPATIENT
Start: 2019-06-08 | End: 2019-06-08

## 2019-06-08 RX ORDER — AMPICILLIN AND SULBACTAM 2; 1 G/1; G/1
3 INJECTION, POWDER, FOR SOLUTION INTRAMUSCULAR; INTRAVENOUS ONCE
Status: COMPLETED | OUTPATIENT
Start: 2019-06-08 | End: 2019-06-08

## 2019-06-08 RX ORDER — CEFDINIR 300 MG/1
300 CAPSULE ORAL 2 TIMES DAILY
Qty: 20 CAPSULE | Refills: 0 | Status: SHIPPED | OUTPATIENT
Start: 2019-06-08 | End: 2019-07-12

## 2019-06-08 RX ORDER — AMPICILLIN AND SULBACTAM 2; 1 G/1; G/1
3 INJECTION, POWDER, FOR SOLUTION INTRAMUSCULAR; INTRAVENOUS ONCE
Status: DISCONTINUED | OUTPATIENT
Start: 2019-06-08 | End: 2019-06-08

## 2019-06-08 RX ORDER — HYDROCODONE BITARTRATE AND ACETAMINOPHEN 5; 325 MG/1; MG/1
1-2 TABLET ORAL EVERY 6 HOURS PRN
Qty: 10 TABLET | Refills: 0 | Status: SHIPPED | OUTPATIENT
Start: 2019-06-08 | End: 2019-07-12

## 2019-06-08 RX ORDER — IOPAMIDOL 755 MG/ML
71 INJECTION, SOLUTION INTRAVASCULAR ONCE
Status: COMPLETED | OUTPATIENT
Start: 2019-06-08 | End: 2019-06-08

## 2019-06-08 RX ORDER — HYDROMORPHONE HYDROCHLORIDE 1 MG/ML
0.5 INJECTION, SOLUTION INTRAMUSCULAR; INTRAVENOUS; SUBCUTANEOUS ONCE
Status: DISCONTINUED | OUTPATIENT
Start: 2019-06-08 | End: 2019-06-08

## 2019-06-08 RX ADMIN — SODIUM CHLORIDE 1000 ML: 9 INJECTION, SOLUTION INTRAVENOUS at 16:03

## 2019-06-08 RX ADMIN — HYDROMORPHONE HYDROCHLORIDE 0.5 MG: 1 INJECTION, SOLUTION INTRAMUSCULAR; INTRAVENOUS; SUBCUTANEOUS at 17:39

## 2019-06-08 RX ADMIN — AMPICILLIN SODIUM AND SULBACTAM SODIUM 3 G: 2; 1 INJECTION, POWDER, FOR SOLUTION INTRAMUSCULAR; INTRAVENOUS at 17:47

## 2019-06-08 RX ADMIN — SODIUM CHLORIDE 61 ML: 9 INJECTION, SOLUTION INTRAVENOUS at 17:25

## 2019-06-08 RX ADMIN — IOPAMIDOL 71 ML: 755 INJECTION, SOLUTION INTRAVENOUS at 17:25

## 2019-06-08 ASSESSMENT — ENCOUNTER SYMPTOMS
ABDOMINAL PAIN: 1
FEVER: 0
DIARRHEA: 0
VOMITING: 0
NAUSEA: 0

## 2019-06-08 ASSESSMENT — MIFFLIN-ST. JEOR: SCORE: 1371.74

## 2019-06-08 NOTE — DISCHARGE INSTRUCTIONS
Fluids, ibuprofen and/or Norco as needed for pain, Cefdinir twice a day starting tonight.  See your OB in the clinic on Monday, return to the ER sooner if you develop fevers and worsening pain.  Consider pumping and throwing away the breastmilk while you are taking the pain medicine.    Opioid Medication Information  You have been given a prescription for an opioid (narcotic) pain medicine and/or have received a pain medicine while here in the emergency department. These medicines can make you drowsy or impaired. You must not drive, operate dangerous equipment, or engage in any other dangerous activities while taking these medications. If you drive while taking these medications, you could be arrested for DUI, or driving under the influence. Do not drink any alcohol while you are taking these medications.   Opioid pain medications can cause addiction. If you have a history of chemical dependency of any type, you are at a higher risk of becoming addicted to pain medications. Only take these prescribed medications to treat your pain when all other options have been tried. Take it for as short a time and as few doses as possible. Store your pain pills in a secure place, as they are frequently stolen and provide a dangerous opportunity for children or visitors in your house to start abusing these powerful medications. We will not replace any lost or stolen medicine. As soon as your pain is better, you should flush all your remaining medication.   Many prescription pain medications contain Tylenol (acetaminophen), including Vicodin, Tylenol #3, Norco, Lortab, and Percocet. You should not take any extra pills of Tylenol if you are using these prescription medications or you can get very sick. Do not ever take more than 4000 mg of acetaminophen in any 24 hour period.  All opioids tend to cause constipation. Drink plenty of water and eat foods that have a lot of fiber, such as fruits, vegetables, prune juice, apple juice  and high fiber cereal. Take a laxative if you don?t move your bowels at least every other day. Miralax, Milk of Magnesia, Colace, or Senna can be used to keep you regular.   Discharge Instructions  Urinary Tract Infection  You or your child have been diagnosed with a urinary tract infection, or UTI. The urinary tract includes the kidneys (which make urine/pee), ureters (the tubes that carry urine/pee from the kidneys to the bladder), the bladder (which stores urine/pee), and urethra (the tube that carries urine/pee out of the bladder). Urinary tract infections occur when bacteria travel up the urethra into the bladder (bladder infection) and, in some cases, from there into the kidneys (kidney infection).  Generally, every Emergency Department visit should have a follow-up clinic visit with either a primary or a specialty clinic/provider. Please follow-up as instructed by your emergency provider today.  Return to the Emergency Department if:  You or your child have severe back pain.  You or your child are vomiting (throwing up) so that you cannot take your medicine.  You or your child have a new fever (had not previously had a fever) over 101 F.  You or your child have confusion or are very weak, or feel very ill.  Your child seems much more ill, will not wake up, will not respond right, or is crying for a long time and will not calm down.  You or your child are showing signs of dehydration. These signs may include decreased urination (pee), dry mouth/gums/tongue, or decreased activity.    Follow-up with your provider:   Children under 24 months need to be seen by their regular provider within one week after a diagnosis of a UTI. It may be necessary to do some more tests to look at the child?s kidney or bladder.  You should begin to feel better within 24 - 48 hours of starting your antibiotic; follow-up with your regular clinic/doctor/provider if this is not the case.    Treatment:   You will be treated with an  antibiotic to kill the bacteria. We have to make an educated guess, based on what we know about common bacteria and antibiotics, as to which antibiotic will work for your infection. We will be correct most times but there will be some cases where the antibiotic chosen is not correct (see urine cultures below).  Take a pain medication such as acetaminophen (Tylenol ) or ibuprofen (Advil , Motrin , Nuprin ).  Phenazopyridine (Pyridium , Uristat ) is a prescription medication that numbs the bladder to reduce the burning pain of some UTIs.  The same medication is available in a non-prescription version (Azo-Standard , Urodol ). This medication will change the color of the urine and tears (usually blue or orange). If you wear contacts, do not wear them while taking this medication as they may be stained by the medication.    Urine Cultures:  If indicated, a urine culture may have been performed today. This test generally takes 24-48 hours to complete so the results are not known at this time. The results can confirm that an infection is present but also determine which antibiotic is effective for the specific bacteria that is causing the infection. If your urine culture shows that the antibiotic you were given today will not work to treat your infection, we will attempt to contact you to make arrangements to change the antibiotic. If the culture confirms that the antibiotic is effective for your infection, you will not be contacted. We often recommend follow-up with your regular physician/provider on the culture results regardless of this process.    Antibiotic Warning:   If you have been placed on antibiotics - watch for signs of allergic reaction.  These include rash, lip swelling, difficulty breathing, wheezing, and dizziness.  If you develop any of these symptoms, stop the antibiotic immediately and go to an emergency room or urgent care for evaluation.    Probiotics: If you have been given an antibiotic, you may  "want to also take a probiotic pill or eat yogurt with live cultures. Probiotics have \"good bacteria\" to help your intestines stay healthy. Studies have shown that probiotics help prevent diarrhea and other intestine problems (including C. diff infection) when you take antibiotics. You can buy these without a prescription in the pharmacy section of the store.   If you were given a prescription for medicine here today, be sure to read all of the information (including the package insert) that comes with your prescription.  This will include important information about the medicine, its side effects, and any warnings that you need to know about.  The pharmacist who fills the prescription can provide more information and answer questions you may have about the medicine.  If you have questions or concerns that the pharmacist cannot address, please call or return to the Emergency Department.   Remember that you can always come back to the Emergency Department if you are not able to see your regular provider in the amount of time listed above, if you get any new symptoms, or if there is anything that worries you.      "

## 2019-06-08 NOTE — ED PROVIDER NOTES
"  History     Chief Complaint:  Abdominal Pain    HPI   Rocio Elkins is a 33 year old female who presents with abdominal pain. The patient gave birth to her first child 10 days ago on 5/29/19. This was a vaginal delivery, but she did sustain a third degree tear which required repair. The patient reports that overall she has been  Recuperating well. However, she notes that around 6:30/7:00 am (about 9 hours ago), she developed lower abdominal pain which she describes as cramping. She states she was hoping this would resolve on its own, but it did not. She called her OB who referred her to the ED for evaluation. She has not had any heavy vaginal bleeding. She denies having any recent fevers, nausea, vomiting, or diarrhea.    Allergies:  Pseudoephedrine    Medications:    Cholecalciferol  Levothyroxine  Colace    Past Medical History:    Diabetes  Hypothyroidism   LTBI (latent tuberculosis infection)  Thyrotoxic exophthalmos  Vitamin D deficiency     Past Surgical History:    History reviewed. No pertinent surgical history.     Family History:    History reviewed. No pertinent family history.     Social History:  The patient presents to the ED with her .  Marital status:   Smoking status: Never Smoker  Alcohol use: No  Drug use: No    Review of Systems   Constitutional: Negative for fever.   Gastrointestinal: Positive for abdominal pain. Negative for diarrhea, nausea and vomiting.   Genitourinary: Negative for vaginal bleeding.   All other systems reviewed and are negative.    Physical Exam     Patient Vitals for the past 24 hrs:   BP Temp Temp src Pulse Resp SpO2 Height Weight   06/08/19 1502 107/69 98.3  F (36.8  C) Oral 88 16 100 % 1.702 m (5' 7\") 64.4 kg (142 lb)       Physical Exam  Nursing note and vitals reviewed.    Constitutional:  Appears well-developed and well-nourished. Appears uncomfortable.    HENT:    Nose normal.  No discharge.      Oropharynx is clear and moist.  Eyes:    Conjunctivae " are normal without injection. No lid droop.     Pupils are equal, round, and reactive to light.   Cardiovascular:  Normal rate, regular rhythm with normal S1 and S2.      Normal heart sounds and peripheral pulses 2+ and equal.       No murmur or keaton.  Pulmonary:  Effort normal and breath sounds clear to auscultation bilaterally.     No respiratory distress.  No stridor.     No wheezes. No rales.     GI:    Soft. No distension and no mass. Lower abdominal suprapubic tenderness without rebound but some guarding     No flank pain.  No HSM.  Musculoskeletal:  Normal range of motion. No extremity deformity.  Trace ankle edema.  Neurological:   Alert and oriented. No cranial nerve deficit, no facial droop.     Exhibits good muscle tone. Coordination normal.      GCS eye subscore is 4. GCS verbal subscore is 5.      GCS motor subscore is 6.   Skin:    Skin is warm and dry. No rash noted. No diaphoresis.      No erythema. Somewhat pale.  No lesions.  Psychiatric:   Behavior is normal. Appropriate mood and affect.     Judgment and thought content normal.     Emergency Department Course     Imaging:  Radiographic findings were communicated with the patient who voiced understanding of the findings.    US PELVIC LIMITED:  IMPRESSION: Enlarged postpartum uterus with mildly thickened  endometrium. No free fluid in the pelvis.   As read by Radiology.    CT ABDOMEN PELVIS W CONTRAST:  IMPRESSION:  Enlarged heterogeneous uterus, consistent with postpartum state. No  evidence of bowel obstruction or appendicitis.  As read by Radiology.     Laboratory:  CBC: WBC 15.5 (H), HGB 10.1 (L), o/w WNL ()  CRP: 14.4 (H)    UA: pending  Urine Culture: in process    Interventions:  1603: NS 1L IV Bolus  1739: Dilaudid 0.5 mg IV   1750: Unasyn 3 g IV    Medications   ampicillin-sulbactam (UNASYN) 3 g vial to attach to  mL bag (has no administration in time range)   HYDROmorphone (PF) (DILAUDID) injection 0.5 mg (0.5 mg Intravenous  Given 6/8/19 1739)   0.9% sodium chloride BOLUS (1,000 mLs Intravenous New Bag 6/8/19 1603)   iopamidol (ISOVUE-370) solution 71 mL (71 mLs Intravenous Given 6/8/19 1725)   Saline flush (61 mLs Intravenous Given 6/8/19 1725)         Emergency Department Course:  Past medical records, nursing notes, and vitals reviewed.  1543: I performed an exam of the patient and obtained history, as documented above.   IV inserted and blood samples were collected and sent for laboratory testing, findings above.    1550: I spoke to the ultrasound technician.     The patient was sent for a pelvic ultrasound while in the emergency department, findings above.    1639: I rechecked the patient and explained the findings of her laboratory studies. We discussed admission and the patient is agreeable to this plan.    1654: I consulted with Dr. Horn of OB GYN regarding the patient.    1709: I rechecked the patient.  The patient was sent for a CT scan of the abdomen and pelvis while in the emergency department, findings above.  A urine sample was collected and sent for laboratory testing.    1729: I rechecked the patient.    1734: I rechecked the patient and explained the findings of her CT imaging.    Findings and plan explained to the patient. Patient discharged home with instructions regarding supportive care and reasons to return. The importance of close follow-up was reviewed.    Impression & Plan      Medical Decision Making:  Patient comes in with lower abdominal tenderness today.  She has had no fevers or chills, no urinary symptoms.  She had a normal vaginal delivery with a third-degree tear 10 days ago but has had no vaginal symptoms.  She still has her appendix.  Appetite was so-so today and she has diffuse tenderness across her lower abdomen.  She was given a half a milligram of Dilaudid IV along with a normal saline bolus.  Her white count came back elevated at 15.5, her CRP is elevated at 14.4.  She was sent down for an  ultrasound and had to have a full bladder and the ultrasound showed some debris consistent with the postpartum state.  Went back and reexamined her and it seemed like she had more tenderness over the right lower quadrant and because of the elevated white count I decided to do a CT scan of the abdomen and pelvis to evaluate for possible appendicitis.  I did talk with Dr. Horn from OB/GYN and he said that if there is no evidence of appendicitis and this appears to be consistent with postpartum endometritis that she could conceivably be treated with 1 dose of IV antibiotic and started on oral Augmentin as long she does not have a fever and is hemodynamically stable.  CT scan was obtained and came back normal for her appendix and no free fluid.  It showed a fairly unremarkable postpartum uterus.  The patient wanted to go home so I ordered a dose of IV Unasyn and will send her home on Augmentin.  I will also send her home with some Norco for the pain and she needs to follow-up in the clinic on Monday but if she gets worse she knows to return to the ER.    Fluids, ibuprofen and/or Norco as needed for pain, Augmentin twice a day starting tonight.  See your OB in the clinic on Monday, return to the ER sooner if you develop fevers and worsening pain.  Consider pumping and throwing away the breastmilk while you are taking the pain medicine.      Diagnosis:    ICD-10-CM    1. Postpartum endometritis O86.12    2. Postpartum state Z39.2          Disposition:  Discharged to home      Discharge Medications:   Details   amoxicillin-clavulanate (AUGMENTIN) 875-125 MG tablet Take 1 tablet by mouth 2 times daily for 7 days  Qty: 14 tablet, Refills: 0      HYDROcodone-acetaminophen (NORCO) 5-325 MG tablet Take 1-2 tablets by mouth every 6 hours as needed for severe pain  Qty: 10 tablet, Refills: 0       Sultana Walker  6/8/2019    EMERGENCY DEPARTMENT  I, Sultana Walker, am serving as a scribe at 3:43 PM on 6/8/2019 to document  services personally performed by Lissette Bravo MD based on my observations and the provider's statements to me.       Lissette Bravo MD  06/08/19 [id number]

## 2019-06-08 NOTE — TELEPHONE ENCOUNTER
Patient calling reporting having pain at her episiotomy site. Patient states she had third degree tear with delivery. Rates pain at 9/0 and reports iburprofen not helpful. Denies fever. Informed patient RN will page on call midwife to call patient directly for second level triage.     RN paged on call provider, ISELA LOZA CNM for [facility] group at 10:28am via smart web to call patient directly at [phone].    Advised patient to call back if has not recieved a call from provider within 20 minutes.    Michael Ceballos RN  [facility]       Reason for Disposition    [1] SEVERE pain at episiotomy site AND [2] not relieved by pain medications    Additional Information    Negative: [1] Widespread rash AND [2] bright red, sunburn-like    Negative: Fever > 100.4 F (38.0 C)    Negative: [1] Strong urge to urinate BUT [2] can't pass urine for > 4 hours (or can only pass few drops)    Negative: Patient sounds very sick or weak to the triager    Protocols used: POSTPARTUM - EPISIOTOMY SYMPTOMS-A-AH

## 2019-06-08 NOTE — ED AVS SNAPSHOT
Emergency Department  [address] [place] [id number]  Phone:  [phone]  Fax:  [phone]                                    Rocio Elkins   MRN: [id number]    Department:   Emergency Department   Date of Visit:  6/8/2019           After Visit Summary Signature Page    I have received my discharge instructions, and my questions have been answered. I have discussed any challenges I see with this plan with the nurse or doctor.    ..........................................................................................................................................  Patient/Patient Representative Signature      ..........................................................................................................................................  Patient Representative Print Name and Relationship to Patient    ..................................................               ................................................  Date                                   Time    ..........................................................................................................................................  Reviewed by Signature/Title    ...................................................              ..............................................  Date                                               Time          22EPIC Rev 08/18

## 2019-06-08 NOTE — ED NOTES
"Pt reports new onset of \"severe cramping\" in her abdomen with minimal bleeding. Recent hx of vaginal delivery post 9 days.  Denies Nausea, vomiting, diarrhea or dizziness.   "

## 2019-06-08 NOTE — TELEPHONE ENCOUNTER
Called Dick back. She sounded in pain and sounded strained while talking to her.    Pain started at about 0600. Abdominal cramping rated at about 9/10. Unable to control pain with Tylenol & ibuprofen. Pain throughout abdomen. Breast feeding a little, but breasts are not sore. States her laceration is not what is causing pain. Dysuria x1.     Denies fever/chills, diarrhea, vomiting, abnormal vaginal discharge, passing clots. States bleeding is light.     We discussed possibility of UTI or uterine infection. I recommended she go to the ED for evaluation. I explained that the clinic was not open and this sounded like it needed more immediate attention. She agreed.    Jevon Montero, DNP, APRN, CNM

## 2019-06-09 LAB
BACTERIA SPEC CULT: NORMAL
Lab: NORMAL
SPECIMEN SOURCE: NORMAL

## 2019-06-09 NOTE — ED NOTES
Patient signed out to me by Dr. Bravo pending UA and completion of IV unasyn.  Briefly, 33yoF a little over one month post-partum, currently breastfeeding, h/o 3rd degree tear, presented with severe abdominal cramping.  Per report, no abnormal vaginal discharge, no fever.    Labs Ordered and Resulted from Time of ED Arrival Up to the Time of Departure from the ED   CBC WITH PLATELETS DIFFERENTIAL - Abnormal; Notable for the following components:       Result Value    WBC 15.5 (*)     RBC Count 3.35 (*)     Hemoglobin 10.1 (*)     Hematocrit 30.4 (*)     RDW 15.6 (*)     Absolute Neutrophil 13.6 (*)     All other components within normal limits   CRP INFLAMMATION - Abnormal; Notable for the following components:    CRP Inflammation 14.4 (*)     All other components within normal limits   UA MACROSCOPIC WITH REFLEX TO MICRO AND CULTURE - Abnormal; Notable for the following components:    Blood Urine Moderate (*)     Leukocyte Esterase Urine Large (*)     RBC Urine 3 (*)     WBC Urine 79 (*)     Bacteria Urine Few (*)     All other components within normal limits   PERIPHERAL IV CATHETER   URINE CULTURE AEROBIC BACTERIAL     CT Abdomen Pelvis w Contrast   Final Result   IMPRESSION:   Enlarged heterogeneous uterus, consistent with postpartum state. No   evidence of bowel obstruction or appendicitis.      AJIT THURMAN MD      US Pelvic Limited   Final Result   IMPRESSION: Enlarged postpartum uterus with mildly thickened   endometrium. No free fluid in the pelvis.       AJIT THURMAN MD        ED course: Patient remained hemodynamically stable.  Received IV dilaudid, which helped with symptoms of pain.  IV unasyn completed.  UA resulted on my shift - shows WBC's, few bacteria.    Plan:  No appendicitis, no retained products; symptoms could be endometritis versus UTI.  I changed oral antibiotic from augmentin to cefdinir, which still has broad coverage for gram neg and gram pos, though not anaerobes.  Better coverage for  UTI, though WBC's in urine could also be from endometritis.  I considered adding flagyl, but this is not recommended for breastfeeding.  Patient and  would like to go home as she misses her baby.  Currently, no signs of sepsis.  Urine culture pending.  Return precautions re-iterated.     Lissette Lopez MD  06/09/19 0522

## 2019-06-10 NOTE — RESULT ENCOUNTER NOTE
Final urine culture report is NEGATIVE per [facility] Result protocol.    If NEGATIVE result, no change in treatment, per [facility] Result protocol.

## 2019-06-12 ENCOUNTER — TELEPHONE (OUTPATIENT)
Dept: MIDWIFE SERVICES | Facility: CLINIC | Age: 34
End: 2019-06-12

## 2019-06-12 DIAGNOSIS — Z91.89 BREASTFEEDING PROBLEM: Primary | ICD-10-CM

## 2019-06-12 NOTE — TELEPHONE ENCOUNTER
Called patient at 2 weeks postpartum to answer any questions and to see how she is doing.  Dick was recently seen in ED and treated for endometritis but reports she is now doing well, no pain and is continuing antibiotics   Encouraged to make 6 week postpartum visit 1 month from now  Reviewed postpartum depression warning signs, denies all  Discussed breastfeeding, doing both breast and bottle because she was admitted for dehydration 1 day after going home. Feels frustrated with that, has not sought help from pediatrician office. [facility] lactation referral placed for [facility]. Phone number given. Discussed she may call [facility] peds with any concerns about feeding and also use their lactation support, she can also utilize free weight checks at [facility].  Patient to call with any other questions or concerns.     OMAR Maya, CNM

## 2019-07-02 DIAGNOSIS — Z86.32 HISTORY OF DIET-CONTROLLED GESTATIONAL DIABETES MELLITUS: Primary | ICD-10-CM

## 2019-07-06 ENCOUNTER — TELEPHONE (OUTPATIENT)
Dept: MIDWIFE SERVICES | Facility: CLINIC | Age: 34
End: 2019-07-06

## 2019-07-06 NOTE — TELEPHONE ENCOUNTER
Patient called today.    Patient's mother is coming from out of the country in late  to help with patient's  child and her care giving.    Patient is wondering if the  OB/GYN Clinic and a MIDWIFE can assist with giving her a letter indicating that the mother will be coming to do this?    Mother's name:  Jenifer Myers    Patient would like to  letter at the  OB/GYN Clinic.    Please contact patient if questions.    Thank you.    Juan J LEONARD

## 2019-07-06 NOTE — LETTER
[facility] [place]  [address] [place] [id number]  [phone]  Dept: [phone]      To whom it may concern,    Rocio Elkins had a delivery on 5/29/19 with our clinic.  Her mother, Jenifer Myers, is planning to come to [place] in September 2019 to help with childcare and support Rocio. Please let us know if you have any further questions or concerns or need additional information to support her in coming here.                  OMAR Maya, CNM

## 2019-07-08 NOTE — TELEPHONE ENCOUNTER
Letter written and printed. Will leave at front for patient. She can come get it today or sooner or pick it up Friday at her postpartum visit.       OMAR Maya, CNM

## 2019-07-08 NOTE — PROGRESS NOTES
Midwife Postpartum 6 Week Visit    Rocio Elkins is a 33 year old here for a postpartum checkup.     Delivery date was 19. She had a  of a viable girl, named Sonu, weight 8 pounds 1.4 oz., with no complications      Since delivery, she has been breast feeding.  She has not had any signs of infection, her lochia stopped after 5 weeks.  She has not had other complications.      She is voiding and having bowel movements without difficulty. Has had some constipation so uses stool softener.       Contraception was discussed and patient desires condoms.   She  has not had intercourse since delivery.   She complains of No  perineal discomfort.     Mood is Stable  Patient screened for postpartum depression.   Depression Rating was:   Last PHQ-9 score on record =   PHQ-9 SCORE 2019   PHQ-9 Total Score 0     Last GAD7 score on record =   JG-7 SCORE 2019   Total Score 0     Alcohol Score = 0    ROS:  12 point review of systems negative other than symptoms noted below.       Current Outpatient Medications:      cholecalciferol (VITAMIN D3) 1000 UNIT tablet, Take 1,000 Units by mouth, Disp: , Rfl:      Docusate Calcium (STOOL SOFTENER PO), , Disp: , Rfl:      levothyroxine (SYNTHROID/LEVOTHROID) 137 MCG tablet, Take 1 tablet (137 mcg) by mouth daily, Disp: 90 tablet, Rfl: 3     Misc. Devices (BREAST PUMP) MISC, 1 each once for 1 dose, Disp: 1 each, Rfl: 0     Prenatal Vit-Fe Fumarate-FA (PREPLUS) 27-1 MG TABS, Take 1 tablet by mouth daily, Disp: 90 tablet, Rfl: 1.   OB History    Para Term  AB Living   1 1 1 0 0 1   SAB TAB Ectopic Multiple Live Births   0 0 0 0 1      # Outcome Date GA Lbr Mino/2nd Weight Sex Delivery Anes PTL Lv   1 Term 19 40w6d 13:30 / 01:46 3.67 kg (8 lb 1.5 oz) F Vag-Spont EPI N [place]      Name: SOCO,FEMALE-ROCIO      Apgar1: 9  Apgar5: 9     Last pap:  17 through [facility]: wnl, HPV-  Hgb in hospital was 10.1    EXAM:  /62   Pulse 68   Ht  "1.702 m (5' 7\")   Wt 63.8 kg (140 lb 9.6 oz)   LMP 2018 (Exact Date)   BMI 22.02 kg/m    BMI: Body mass index is 22.02 kg/m .  Constitutional: healthy, alert and no distress  Neck: symmetrical, thyroid normal size, no masses present, no lymphadenopathy present.   Breast:deferred, patient lactating.  Abdomen: soft, non-tender, diastasis 1, 2 FB's    PELVIC EXAM:  Vulva: No lesions, well healed, BUS WNL, no tenderness  Vagina: Moist, pink, discharge normal  well rugated, no lesions  Cervix:smooth, pink, no visible lesions  Uterus: Involuted to normal size, non-tender, no masses palpated  Ovaries: No masses palpated  Rectal exam: deferred      ASSESSMENT:   Normal postpartum exam after  and 3rd degree laceration.    ICD-10-CM    1. Postpartum exam Z39.2    2. History of diet controlled gestational diabetes mellitus (GDM) Z86.32    3. Lactating mother Z39.1    4. History of third degree perineal laceration Z87.59    5. History of postpartum hemorrhage Z86.2 OB hemoglobin         PLAN:  Results for orders placed or performed in visit on 19   OB hemoglobin   Result Value Ref Range    Hemoglobin 11.2 (L) 11.7 - 15.7 g/dL       Return as needed or at time of next expected pap, pelvic, or breast exam.  Teaching: self breast exam, exercise, birth control and weight/diet  Family Planning:condoms; counseled on LARC methods of Mirena, Paragard, Nexplanon  Encourage Kegels and abdominal exercise.  Continue a multivitamin/prenatal supplement, especially if breastfeeding.  Pap smear was not obtained today.   Postpartum Hgb was done today.    GDM:  Fasting and 2hr GCT needed:  Yes, being done today  If yes, remind need for annual fasting BG and nutrition/exercise recommendations.  Has endocrinologist following thyroid     Return to clinic:  1 year for annual exam and pap  return to clinic sooner ROBYN NELSON CNM      "

## 2019-07-12 ENCOUNTER — PRENATAL OFFICE VISIT (OUTPATIENT)
Dept: MIDWIFE SERVICES | Facility: CLINIC | Age: 34
End: 2019-07-12
Payer: COMMERCIAL

## 2019-07-12 VITALS
HEIGHT: 67 IN | BODY MASS INDEX: 22.07 KG/M2 | HEART RATE: 68 BPM | DIASTOLIC BLOOD PRESSURE: 62 MMHG | SYSTOLIC BLOOD PRESSURE: 104 MMHG | WEIGHT: 140.6 LBS

## 2019-07-12 DIAGNOSIS — Z86.32 HISTORY OF DIET-CONTROLLED GESTATIONAL DIABETES MELLITUS: ICD-10-CM

## 2019-07-12 DIAGNOSIS — Z87.59 HISTORY OF POSTPARTUM HEMORRHAGE: ICD-10-CM

## 2019-07-12 DIAGNOSIS — Z87.59 HISTORY OF THIRD DEGREE PERINEAL LACERATION: ICD-10-CM

## 2019-07-12 DIAGNOSIS — Z86.32 HISTORY OF DIET CONTROLLED GESTATIONAL DIABETES MELLITUS (GDM): ICD-10-CM

## 2019-07-12 PROBLEM — O99.013 ANEMIA AFFECTING PREGNANCY IN THIRD TRIMESTER: Status: RESOLVED | Noted: 2018-12-07 | Resolved: 2019-07-12

## 2019-07-12 PROBLEM — N89.8 VAGINAL DISCHARGE DURING PREGNANCY IN THIRD TRIMESTER: Status: RESOLVED | Noted: 2019-05-21 | Resolved: 2019-07-12

## 2019-07-12 PROBLEM — O99.119 THROMBOCYTOPENIA AFFECTING PREGNANCY, ANTEPARTUM (H): Status: RESOLVED | Noted: 2019-02-01 | Resolved: 2019-07-12

## 2019-07-12 PROBLEM — D69.6 THROMBOCYTOPENIA AFFECTING PREGNANCY, ANTEPARTUM (H): Status: RESOLVED | Noted: 2019-02-01 | Resolved: 2019-07-12

## 2019-07-12 PROBLEM — O09.93 SUPERVISION OF HIGH RISK PREGNANCY IN THIRD TRIMESTER: Status: RESOLVED | Noted: 2018-10-04 | Resolved: 2019-07-12

## 2019-07-12 PROBLEM — O26.893 VAGINAL DISCHARGE DURING PREGNANCY IN THIRD TRIMESTER: Status: RESOLVED | Noted: 2019-05-21 | Resolved: 2019-07-12

## 2019-07-12 PROBLEM — O21.0 HYPEREMESIS GRAVIDARUM, ANTEPARTUM: Status: RESOLVED | Noted: 2018-10-05 | Resolved: 2019-07-12

## 2019-07-12 PROBLEM — O24.410 DIET CONTROLLED GESTATIONAL DIABETES MELLITUS (GDM) IN THIRD TRIMESTER: Status: RESOLVED | Noted: 2019-02-09 | Resolved: 2019-07-12

## 2019-07-12 LAB
GLUCOSE 1H P 75 G GLC PO SERPL-MCNC: 89 MG/DL (ref 60–180)
GLUCOSE 2H P 75 G GLC PO SERPL-MCNC: 112 MG/DL (ref 60–155)
GLUCOSE P FAST SERPL-MCNC: 81 MG/DL (ref 60–95)
HGB BLD-MCNC: 11.2 G/DL (ref 11.7–15.7)

## 2019-07-12 PROCEDURE — 99207 ZZC POST PARTUM EXAM: CPT | Performed by: NURSE PRACTITIONER

## 2019-07-12 PROCEDURE — 00000218 ZZHCL STATISTIC OBHBG - HEMOGLOBIN: Performed by: NURSE PRACTITIONER

## 2019-07-12 PROCEDURE — 82951 GLUCOSE TOLERANCE TEST (GTT): CPT | Performed by: NURSE PRACTITIONER

## 2019-07-12 PROCEDURE — 36415 COLL VENOUS BLD VENIPUNCTURE: CPT | Performed by: NURSE PRACTITIONER

## 2019-07-12 ASSESSMENT — ANXIETY QUESTIONNAIRES
1. FEELING NERVOUS, ANXIOUS, OR ON EDGE: NOT AT ALL
IF YOU CHECKED OFF ANY PROBLEMS ON THIS QUESTIONNAIRE, HOW DIFFICULT HAVE THESE PROBLEMS MADE IT FOR YOU TO DO YOUR WORK, TAKE CARE OF THINGS AT HOME, OR GET ALONG WITH OTHER PEOPLE: NOT DIFFICULT AT ALL
5. BEING SO RESTLESS THAT IT IS HARD TO SIT STILL: NOT AT ALL
2. NOT BEING ABLE TO STOP OR CONTROL WORRYING: NOT AT ALL
7. FEELING AFRAID AS IF SOMETHING AWFUL MIGHT HAPPEN: NOT AT ALL
6. BECOMING EASILY ANNOYED OR IRRITABLE: NOT AT ALL
3. WORRYING TOO MUCH ABOUT DIFFERENT THINGS: NOT AT ALL
GAD7 TOTAL SCORE: 0

## 2019-07-12 ASSESSMENT — PATIENT HEALTH QUESTIONNAIRE - PHQ9
SUM OF ALL RESPONSES TO PHQ QUESTIONS 1-9: 0
5. POOR APPETITE OR OVEREATING: NOT AT ALL

## 2019-07-12 ASSESSMENT — MIFFLIN-ST. JEOR: SCORE: 1365.39

## 2019-07-12 NOTE — PATIENT INSTRUCTIONS
COMMON BREASTFEEDING PROBLEMS    Women can have several different problems when they breastfeed.  Women often quit breastfeeding when these problems occur, but most problems can be treated and women can continue to breastfeed their babies.    Engorgement:    This is when the breasts are too full of milk.  Breasts can be swollen, hard, warm and painful. This can also make latch more difficult for your baby.  The only proven way to reduce this is to hand express or pump to let some milk out between feedings. Letting out too much milk will exacerbate the problem by producing even more milk.    You can also try:    Cold packs    Taking pain relieving medicine such as acetaminophen (Tylenol) or ibuprofen (Advil, Motrin)    Take a warm shower    Massage your breasts to start milk flow.     Breastfeed your baby on demand, make sure baby is emptying breast completely    Prevent engorgement by limiting pumping if you are breastfeeding on demand    Sore/Painful Nipples:    Some nipple soreness is normal during the first minute of breast feeding. Pain that is lasting longer is not normal after breast feeding is established.  Pain can be caused by nipple cracks and blisters.  This is usually due to an incorrect latch.    The best way to reduce nipple soreness is to make sure your baby is latching correctly on your breast.    You can also try:    Lanolin ointment    APNO (All purpose nipple ointment) which you need a prescription for    Apply a cold or warm cloth to your nipples    Wear breast pads to protect your nipples    Let your nipples air dry after feedings    Cleanse nipple with unscented soap      Blocked Milk Ducts:    A blocked milk duct can cause a red, painful lump in your breast.  It can also cause a white plug at the end of your nipple. If you have a blocked milk duct, breastfeed more often.  Make sure your baby empties your breast after each feeding.  Start your feedings with the breast with the plugged duct and  try various positions to empty the breast as much as you can. Massage your plugged duct in a warm shower to try to unplug it. If these methods do not work you may be at risk for a breast infection.     Breast Infection:    A breast infection is called Mastitis.  In addition to having a hard, red, swollen area of your breast you will most likely will also have fever and chills and not feel well.  Engorgement and incomplete breast emptying can contribute to the problem and make your symptoms worse.    DO NOT STOP breastfeeding when you have mastitis!    Make an appointment with your midwife to be seen as soon as possible.  You will be started immediately on an antibiotic.    You may also:    Use pain medication such as  such as acetaminophen (Tylenol) or ibuprofen (Advil, Motrin)    Massage your breasts during feedings    Use warm compress to encourage milk let down prior to feeding     Use a breast pump to empty your breasts more completely after feedings    Rest for the next day or so and increase your fluids   -Take Chamomile tea, Hops infusion or lemon balm infusion     Watch for increased pain, increasing size of the breast, increasing firmness or a developing mass to suggest an abscess formation.    You should improve quickly on antibiotics.  If you are not improving with in the next 1-2 days or your fever increases > 101 F call the clinic.      For reliable information on breast feeding visit:    [employer]  Http://www.lllofmndas.org/ ([facility] [place])      If you are struggling with breastfeeding and need extra support you may also consider seeking the advice of a lactation consultation.    Please call with further concerns:    [facility]  [phone]                                Patient Education     Birth Control Choices  Birth control keeps you from getting pregnant during sex. There are many types of birth control. Some are more effective than  others. New types are being tested all the time. Your healthcare provider can help you decide which type of birth control is best for you. But no matter which type you choose, you and your partner must use it the right way each time you have sex. Some of the most common types are described below.  Condom  A condom is a thin covering that fits over the penis. (The female condom fits inside the vagina.) A condom catches sperm that come out of the penis during sex.  Spermicide  Spermicide is a gel, foam, cream, tablet, or sponge (although the sponge has barrier properties in addition to spermicidal properties). It is put in the vagina before sex to kill sperm.  Diaphragm and cervical cap  Diaphragms and cervical caps are round rubber cups that keep sperm out of the uterus. They also hold spermicide in place.  Intrauterine device (IUD)  An IUD is a small device that is placed in the uterus by a healthcare provider to prevent pregnancy.  The pill  The birth control pill is taken daily. It contains hormones that stop a woman s body from releasing an egg each month.  Other hormones  Hormones that stop a woman s egg from being released each month can be delivered in other ways. These include injection, implant, patch, or vaginal ring.  Other choices  Here are additional birth control methods:    Male sterilization (vasectomy) is surgery that ties off or cuts the tubes called the vas deferens in the testes. This is done so sperm cannot come out when the man ejaculates.    Female sterilization is surgery to block or cut the woman's fallopian tubes. It can be done by placing an instrument into the uterus (hysteroscopy) to insert small coils into the fallopian tubes (Essure). It can also be done through the belly (laparoscopy) to block the tubes or remove part or all of the tubes.    Withdrawal method is when the male doesn't ejaculate into the vagina, but rather withdraws his penis just before he ejaculates.    Fertility  awareness method is when a woman keeps track of her fertile days. She only has intercourse at times when she is not likely to get pregnant.  Emergency contraception (EC)  Emergency contraception can help prevent pregnancy after unprotected sex. Hormone pills ( morning after pills ) are available over the counter to anyone. A second type of EC, a copper IUD, needs to be inserted by a trained healthcare provider. Either type of EC can be used up to 5 days after sex, but it should be taken as soon as possible. The sooner it is used after unprotected sex, the more likely it is to be effective. EC will not work if you re already pregnant.  Things to consider  Think about the following:    Choose a type of birth control that is easy for you to use.    Read the package and follow your health care provider's instructions to learn to use your birth control the right way.    Most forms of birth control do not protect you from sexually transmitted infections (STIs). To protect against STIs, always use a latex condom. If you are allergic to latex, a nonlatex condom may provide some protection.   Date Last Reviewed: 12/1/2016 2000-2018 [employer]. [address] [place] [address]. All rights reserved. This information is not intended as a substitute for professional medical care. Always follow your healthcare professional's instructions.

## 2019-07-12 NOTE — Clinical Note
Please abstract the following data from this visit with this patient into the appropriate field in Epic:Pap smear & HPV done on this date: 05/09/17 (approximately), by this group: [facility], results were negative, HPV-.

## 2019-07-12 NOTE — LETTER
To whom it may concern,    Cong Elkins gave birth on 5/29/2019 with our clinic.  Her mother in law, Jenifer Myers, is planning to come to the [place] in September 2019 to help with childcare and support Rocio.  Please let us know if you have any further questions or concerns, or if additional information is needed.        Homero,      Faby NELSON CNM, NP-[facility]

## 2019-07-13 ASSESSMENT — ANXIETY QUESTIONNAIRES: GAD7 TOTAL SCORE: 0

## 2019-07-19 ENCOUNTER — TELEPHONE (OUTPATIENT)
Dept: MIDWIFE SERVICES | Facility: CLINIC | Age: 34
End: 2019-07-19

## 2019-07-19 NOTE — TELEPHONE ENCOUNTER
Pt calling for hgb result from 19   PPH with QBL: 1198  hgb 10.1 on   11.2     Informed pt of result. Routing to midwives for recommendations if any further f/u is needed  Floresita Sherman RN on 2019 at 12:05 PM

## 2019-07-19 NOTE — TELEPHONE ENCOUNTER
No further follow up needed. 11.2 is almost normal and 10.1 after delivery is not considered that abnormal. Patient can follow up if symptoms occur, SOB, dizziness, etc.    OMAR Maya, KELLENM

## 2019-07-25 ENCOUNTER — OFFICE VISIT (OUTPATIENT)
Dept: FAMILY MEDICINE | Facility: CLINIC | Age: 34
End: 2019-07-25
Payer: COMMERCIAL

## 2019-07-25 VITALS — DIASTOLIC BLOOD PRESSURE: 64 MMHG | SYSTOLIC BLOOD PRESSURE: 102 MMHG

## 2019-07-25 DIAGNOSIS — L72.0 EPIDERMOID CYST: ICD-10-CM

## 2019-07-25 DIAGNOSIS — L82.1 DERMATOSIS PAPULOSA NIGRA: ICD-10-CM

## 2019-07-25 DIAGNOSIS — L81.1 MELASMA: Primary | ICD-10-CM

## 2019-07-25 PROCEDURE — 99213 OFFICE O/P EST LOW 20 MIN: CPT | Performed by: FAMILY MEDICINE

## 2019-07-25 RX ORDER — HYDROQUINONE 40 MG/G
CREAM TOPICAL
Qty: 30 G | Refills: 0 | Status: SHIPPED | OUTPATIENT
Start: 2019-07-25 | End: 2019-10-28

## 2019-07-25 NOTE — PROGRESS NOTES
[facility] - PRIMARY CARE SKIN    CC: Rash  SUBJECTIVE:   Rocio Elkins is a(n) 33 year old female who presents to clinic today for black spots on the cheeks. There is also lighter pigmentation on the right cheek and neck. Not itching. She delivered 2 months ago.      Personal Medical History  Skin cancer: NO  Eczema Psoriasis Autoimmune   NO NO NO   Other: .    Family Medical History  Skin cancer: NO  Eczema Psoriasis Autoimmune   NO NO NO   Other: .    Occupation: indoor    Patient Active Problem List   Diagnosis     LTBI (latent tuberculosis infection)     Postablative hypothyroidism     Thyrotoxic exophthalmos     Hypovitaminosis D     History of diet controlled gestational diabetes mellitus (GDM)     Lactating mother     History of third degree perineal laceration     History of postpartum hemorrhage       Past Medical History:   Diagnosis Date     Diabetes (H)     Gestational diabetes diet controlled     Hyperemesis gravidarum, antepartum 10/5/2018     Thyroid disease     Past Surgical History:   Procedure Laterality Date     NO HISTORY OF SURGERY        Social History     Tobacco Use     Smoking status: Never Smoker     Smokeless tobacco: Never Used   Substance Use Topics     Alcohol use: No     Drug use: No         Current Outpatient Medications   Medication Sig Dispense Refill     cholecalciferol (VITAMIN D3) 1000 UNIT tablet Take 1,000 Units by mouth       Docusate Calcium (STOOL SOFTENER PO)        levothyroxine (SYNTHROID/LEVOTHROID) 137 MCG tablet Take 1 tablet (137 mcg) by mouth daily 90 tablet 3     Prenatal Vit-Fe Fumarate-FA (PREPLUS) 27-1 MG TABS Take 1 tablet by mouth daily 90 tablet 1         Allergies   Allergen Reactions     Pseudoephedrine Other (See Comments)     Dizziness, leg weakness  Other reaction(s): Other, see comments  Dizziness, leg weakness          ROS: 14 point review of systems was negative except the symptoms listed above in the HPI.      OBJECTIVE:   GENERAL: healthy,  alert and no distress.  SKIN: Chiu Skin Type - V.  Face and Neck examined. The dermatoscope was used to help evaluate pigmented lesions.  Skin Pertinent Findings:  Left cheek - hyperpigmentation      Scattered 1-2 mm black , raised lesions    Diagnostic Test Results:  none     ASSESSMENT:     Encounter Diagnoses   Name Primary?     Melasma Yes     Dermatosis papulosa nigra      Epidermoid cyst          PLAN:   Patient Instructions   Cerave or cetaphil facial moisturizer every am  hydroxyquinone 4 % apply to affected area q hs for up to 3 months    2% hydroxyquinone with moisturizer        TT: 20 minutes.  CT: 15 minutes.

## 2019-07-25 NOTE — LETTER
7/25/2019         RE: Rocio Elkins  [address]  [facility] [address]        Dear Colleague,    Thank you for referring your patient, Rocio Elkins, to the [facility]. Please see a copy of my visit note below.    [facility] - PRIMARY CARE SKIN    CC: Rash  SUBJECTIVE:   Rocio Elkins is a(n) 33 year old female who presents to clinic today for black spots on the cheeks. There is also lighter pigmentation on the right cheek and neck. Not itching. She delivered 2 months ago.      Personal Medical History  Skin cancer: NO  Eczema Psoriasis Autoimmune   NO NO NO   Other: .    Family Medical History  Skin cancer: NO  Eczema Psoriasis Autoimmune   NO NO NO   Other: .    Occupation: indoor    Patient Active Problem List   Diagnosis     LTBI (latent tuberculosis infection)     Postablative hypothyroidism     Thyrotoxic exophthalmos     Hypovitaminosis D     History of diet controlled gestational diabetes mellitus (GDM)     Lactating mother     History of third degree perineal laceration     History of postpartum hemorrhage       Past Medical History:   Diagnosis Date     Diabetes (H)     Gestational diabetes diet controlled     Hyperemesis gravidarum, antepartum 10/5/2018     Thyroid disease     Past Surgical History:   Procedure Laterality Date     NO HISTORY OF SURGERY        Social History     Tobacco Use     Smoking status: Never Smoker     Smokeless tobacco: Never Used   Substance Use Topics     Alcohol use: No     Drug use: No         Current Outpatient Medications   Medication Sig Dispense Refill     cholecalciferol (VITAMIN D3) 1000 UNIT tablet Take 1,000 Units by mouth       Docusate Calcium (STOOL SOFTENER PO)        levothyroxine (SYNTHROID/LEVOTHROID) 137 MCG tablet Take 1 tablet (137 mcg) by mouth daily 90 tablet 3     Prenatal Vit-Fe Fumarate-FA (PREPLUS) 27-1 MG TABS Take 1 tablet by mouth daily 90 tablet 1         Allergies   Allergen Reactions      Pseudoephedrine Other (See Comments)     Dizziness, leg weakness  Other reaction(s): Other, see comments  Dizziness, leg weakness          ROS: 14 point review of systems was negative except the symptoms listed above in the HPI.      OBJECTIVE:   GENERAL: healthy, alert and no distress.  SKIN: Chiu Skin Type - V.  Face and Neck examined. The dermatoscope was used to help evaluate pigmented lesions.  Skin Pertinent Findings:  Left cheek - hyperpigmentation      Scattered 1-2 mm black , raised lesions    Diagnostic Test Results:  none     ASSESSMENT:     Encounter Diagnoses   Name Primary?     Melasma Yes     Dermatosis papulosa nigra      Epidermoid cyst          PLAN:   Patient Instructions   Cerave or cetaphil facial moisturizer every am  hydroxyquinone 4 % apply to affected area q hs for up to 3 months    2% hydroxyquinone with moisturizer        TT: 20 minutes.  CT: 15 minutes.        Again, thank you for allowing me to participate in the care of your patient.        Sincerely,        Bella Brown MD

## 2019-07-25 NOTE — PATIENT INSTRUCTIONS
Cerave or cetaphil facial moisturizer every am  hydroxyquinone 4 % apply to affected area q hs for up to 3 months    2% hydroxyquinone with moisturizer

## 2019-08-16 ENCOUNTER — TELEPHONE (OUTPATIENT)
Dept: MIDWIFE SERVICES | Facility: CLINIC | Age: 34
End: 2019-08-16

## 2019-08-16 NOTE — TELEPHONE ENCOUNTER
Letter done including husbands name. Will leave at the front with patients name in an envelope for .    OMAR Maya, KELLENM

## 2019-08-16 NOTE — LETTER
To whom it may concern,    Cong Elkins gave birth on 5/29/2019 with our clinic.  Her 's name is Daryl Ramos. Her mother in law, Jenifer Myers, is planning to come to the [place] in September 2019 to help with childcare and support Rocio.  Please let us know if you have any further questions or concerns, or if additional information is needed.            OMAR Maya, CNM

## 2019-08-16 NOTE — TELEPHONE ENCOUNTER
Pt needs a letter frm the midwives for a visa.  The midwives are aware of this.  They hav to rewrite with husbands name in it.  She is trying to get her mother here and they have to write in it that she needs help with .

## 2019-10-28 ENCOUNTER — OFFICE VISIT (OUTPATIENT)
Dept: FAMILY MEDICINE | Facility: CLINIC | Age: 34
End: 2019-10-28
Payer: COMMERCIAL

## 2019-10-28 VITALS
OXYGEN SATURATION: 100 % | BODY MASS INDEX: 23.23 KG/M2 | DIASTOLIC BLOOD PRESSURE: 64 MMHG | HEIGHT: 67 IN | HEART RATE: 67 BPM | RESPIRATION RATE: 16 BRPM | TEMPERATURE: 97.2 F | WEIGHT: 148 LBS | SYSTOLIC BLOOD PRESSURE: 112 MMHG

## 2019-10-28 DIAGNOSIS — M72.2 PLANTAR FASCIA SYNDROME: Primary | ICD-10-CM

## 2019-10-28 DIAGNOSIS — M54.6 ACUTE MIDLINE THORACIC BACK PAIN: ICD-10-CM

## 2019-10-28 PROCEDURE — 99213 OFFICE O/P EST LOW 20 MIN: CPT | Performed by: FAMILY MEDICINE

## 2019-10-28 ASSESSMENT — MIFFLIN-ST. JEOR: SCORE: 1398.95

## 2019-10-28 NOTE — PROGRESS NOTES
Subjective     Rocio Elkins is a 33 year old female who presents to clinic today for the following health issues:    HPI   Musculoskeletal problem/pain      Duration: 1 week    Description  Location: mid back pain     Intensity:  mild    Accompanying signs and symptoms: none    History  Previous similar problem: no   Previous evaluation:  none    Precipitating or alleviating factors:  Trauma or overuse: YES- possibly while vacuuming   Aggravating factors include: standing, bending     Therapies tried and outcome: nothing      Patient Active Problem List   Diagnosis     LTBI (latent tuberculosis infection)     Postablative hypothyroidism     Thyrotoxic exophthalmos     Hypovitaminosis D     History of diet controlled gestational diabetes mellitus (GDM)     Lactating mother     History of third degree perineal laceration     History of postpartum hemorrhage     Past Surgical History:   Procedure Laterality Date     NO HISTORY OF SURGERY         Social History     Tobacco Use     Smoking status: Never Smoker     Smokeless tobacco: Never Used   Substance Use Topics     Alcohol use: No     No family history on file.      Current Outpatient Medications   Medication Sig Dispense Refill     cholecalciferol (VITAMIN D3) 1000 UNIT tablet Take 1,000 Units by mouth       Prenatal Vit-Fe Fumarate-FA (PREPLUS) 27-1 MG TABS Take 1 tablet by mouth daily 90 tablet 1     Allergies   Allergen Reactions     Pseudoephedrine Other (See Comments)     Dizziness, leg weakness  Other reaction(s): Other, see comments  Dizziness, leg weakness     Recent Labs   Lab Test 04/22/19  1432 03/14/19  1028  10/04/18  1430 09/25/18  1300   A1C  --   --   --  5.4  --    CR  --   --   --   --  0.62   GFRESTIMATED  --   --   --   --  >90   GFRESTBLACK  --   --   --   --  >90   POTASSIUM  --   --   --   --  3.6   TSH 0.17* 0.14*   < > 15.71*  --     < > = values in this interval not displayed.      BP Readings from Last 3 Encounters:   10/28/19 112/64  "  07/25/19 102/64   07/12/19 104/62    Wt Readings from Last 3 Encounters:   10/28/19 67.1 kg (148 lb)   07/12/19 63.8 kg (140 lb 9.6 oz)   06/08/19 64.4 kg (142 lb)             Reviewed and updated as needed this visit by Provider         Review of Systems   ROS COMP: Constitutional, HEENT, cardiovascular, pulmonary, gi and gu systems are negative, except as otherwise noted.      Objective    /64 (Cuff Size: Adult Regular)   Pulse 67   Temp 97.2  F (36.2  C) (Tympanic)   Resp 16   Ht 1.702 m (5' 7\")   Wt 67.1 kg (148 lb)   SpO2 100%   Breastfeeding? Yes   BMI 23.18 kg/m    Body mass index is 23.18 kg/m .  Physical Exam   GENERAL: healthy, alert and no distress  NECK: no adenopathy, no asymmetry, masses, or scars and thyroid normal to palpation  RESP: lungs clear to auscultation - no rales, rhonchi or wheezes  CV: regular rate and rhythm, normal S1 S2, no S3 or S4, no murmur, click or rub, no peripheral edema and peripheral pulses strong  ABDOMEN: soft, nontender, no hepatosplenomegaly, no masses and bowel sounds normal  MS:   Mid back-diffuse tenderness on mid back T spine paraspinous muscle, has normal SLRT and NEVA, normal LE strength and sensory   Foot- tendernss on bilateral plantar fascia           Assessment & Plan     1. Plantar fascia syndrome  Will have her to try changing insole(Superfeet) and starts PT  - [facility] REFERRAL; Future    2. Acute midline thoracic back pain  Encourage her to try PT and conservative management   - [facility], [facility] CHIROPRACTIC REFERRAL; Future       FUTURE APPOINTMENTS:       - Follow-up visit in 4-6 weeks if not improving     No follow-ups on file.    Viet Benavidez MD  [facility] [place]        "

## 2019-11-21 ENCOUNTER — THERAPY VISIT (OUTPATIENT)
Dept: PHYSICAL THERAPY | Facility: CLINIC | Age: 34
End: 2019-11-21
Payer: COMMERCIAL

## 2019-11-21 DIAGNOSIS — M54.50 THORACOLUMBAR BACK PAIN: ICD-10-CM

## 2019-11-21 DIAGNOSIS — M54.6 ACUTE MIDLINE THORACIC BACK PAIN: ICD-10-CM

## 2019-11-21 DIAGNOSIS — M54.6 THORACOLUMBAR BACK PAIN: ICD-10-CM

## 2019-11-21 DIAGNOSIS — M72.2 PLANTAR FASCIA SYNDROME: ICD-10-CM

## 2019-11-21 PROCEDURE — 97110 THERAPEUTIC EXERCISES: CPT | Mod: GP | Performed by: PHYSICAL THERAPIST

## 2019-11-21 PROCEDURE — 97161 PT EVAL LOW COMPLEX 20 MIN: CPT | Mod: GP | Performed by: PHYSICAL THERAPIST

## 2019-11-21 NOTE — PROGRESS NOTES
[facility] Initial Evaluation  Subjective:  Patient is referred with a one month hx of central thoracolumbar pain. She thinks sxs resulted from vacuming. She has noted gradual improvement but still notes increased pain with prolonged standing, walking, and bending. Better with sitting and lying down. She is also 5 months post partum.    The history is provided by the patient.   Type of problem:  Thoracic and lumbar   Condition occurred with:  Bending and repetition/overuse. This is a new condition    Patient reports pain:  Central lumbar spine, lower thoracic spine and upper lumbar spine. Radiates to:  No radiation.  Symptoms are exacerbated by bending, walking and standing (vacuming) and relieved by rest.    Rocio MAU Elkins being seen for thoracolumbar pain.   Where condition occurred: at home.  and reported as 5/10 on pain scale. General health as reported by patient is excellent. Pertinent medical history includes:  Thyroid problems.      Current medications:  Thyroid medication.   Primary job tasks include:  Computer work.  Pain is described as aching and is intermittent. Pain is worse during the day. Since onset symptoms are gradually improving.       Restrictions include:  Working in normal job without restrictions.    Barriers include:  None as reported by patient.  Red flags:  None as reported by patient.                      Objective:  System    Physical Exam      Patricia Lumbar Evaluation    Posture:  Sitting: poor  Standing: good  Lordosis: Accentuated  Lateral Shift: no  Correction of Posture: better    Movement Loss:  Flexion (Flex): nil  Extension (EXT): min and pain  Side [place] R (SG R): nil and pain  Side [place] L (SG L): nil and pain  Test Movements:  FIS: During: increases  After: no worse  Mechanical Response: no effectPretest Movements: T-L pain  Repeat FIS: During: no effect  After: no effect  Mechanical Response: no effect  EIS: During: increases  After: no worse   Mechanical Response: no effect  Repeat EIS: During: increases  After: no worse  Mechanical Response: no effect    EIL: During: no effect  After: no effect  Mechanical Response: no effect  Repeat EIL: During: decreases  After: better  Mechanical Response: IncROM        Conclusion: derangement  Principle of Treatment:  Posture Correction: Trial of lumbar roll    Extension: thoracic extension in sitting x 10/ 2 hours; pressups x 10 at home    Other: thoracic towel roll stretch 3-5'                     Patricia Thoracic Evalution:        Test Movements:      Extension:  During:  Increases  After: no worse  Mechanical Response: no effect  Rep Extension:  During:  Decreases  After: better  Mechanical Response: IncROM                                              ROS    Assessment/Plan:    Patient is a 33 year old female with thoracic and lumbar complaints.    Patient has the following significant findings with corresponding treatment plan.                Diagnosis 1:  Thoracolumbar pain  Pain -  manual therapy, self management, education, directional preference exercise and home program  Decreased ROM/flexibility - manual therapy, therapeutic exercise and home program    Therapy Evaluation Codes:   1) History comprised of:   Personal factors that impact the plan of care:      None.    Comorbidity factors that impact the plan of care are:      None.     Medications impacting care: None.  2) Examination of Body Systems comprised of:   Body structures and functions that impact the plan of care:      Lumbar spine and Thoracic Spine.   Activity limitations that impact the plan of care are:      Bending, Reading/Computer work, Standing and Walking.  3) Clinical presentation characteristics are:   Stable/Uncomplicated.  4) Decision-Making    Low complexity using standardized patient assessment instrument and/or measureable assessment of functional outcome.  Cumulative Therapy Evaluation is: Low complexity.    Previous and current  functional limitations:  (See Goal Flow Sheet for this information)    Short term and Long term goals: (See Goal Flow Sheet for this information)     Communication ability:  Patient appears to be able to clearly communicate and understand verbal and written communication and follow directions correctly.  Treatment Explanation - The following has been discussed with the patient:   RX ordered/plan of care  Anticipated outcomes  Possible risks and side effects  This patient would benefit from PT intervention to resume normal activities.   Rehab potential is excellent.    Frequency:  1-2 X week, once daily  Duration:  for 3 weeks  Discharge Plan:  Achieve all LTG.  Independent in home treatment program.  Reach maximal therapeutic benefit.    Please refer to the daily flowsheet for treatment today, total treatment time and time spent performing 1:1 timed codes.

## 2019-11-29 ENCOUNTER — THERAPY VISIT (OUTPATIENT)
Dept: PHYSICAL THERAPY | Facility: CLINIC | Age: 34
End: 2019-11-29
Payer: COMMERCIAL

## 2019-11-29 DIAGNOSIS — M54.6 THORACOLUMBAR BACK PAIN: ICD-10-CM

## 2019-11-29 DIAGNOSIS — M54.50 THORACOLUMBAR BACK PAIN: ICD-10-CM

## 2019-11-29 PROCEDURE — 97110 THERAPEUTIC EXERCISES: CPT | Mod: GP | Performed by: PHYSICAL THERAPIST

## 2019-11-29 PROCEDURE — 97140 MANUAL THERAPY 1/> REGIONS: CPT | Mod: GP | Performed by: PHYSICAL THERAPIST

## 2019-12-04 ENCOUNTER — THERAPY VISIT (OUTPATIENT)
Dept: PHYSICAL THERAPY | Facility: CLINIC | Age: 34
End: 2019-12-04
Payer: COMMERCIAL

## 2019-12-04 DIAGNOSIS — M54.6 THORACOLUMBAR BACK PAIN: ICD-10-CM

## 2019-12-04 DIAGNOSIS — M54.50 THORACOLUMBAR BACK PAIN: ICD-10-CM

## 2019-12-04 PROCEDURE — 97110 THERAPEUTIC EXERCISES: CPT | Mod: GP | Performed by: PHYSICAL THERAPIST

## 2019-12-04 PROCEDURE — 97014 ELECTRIC STIMULATION THERAPY: CPT | Mod: GP | Performed by: PHYSICAL THERAPIST

## 2019-12-30 ENCOUNTER — OFFICE VISIT (OUTPATIENT)
Dept: FAMILY MEDICINE | Facility: CLINIC | Age: 34
End: 2019-12-30
Payer: COMMERCIAL

## 2019-12-30 VITALS
BODY MASS INDEX: 22.71 KG/M2 | WEIGHT: 145 LBS | TEMPERATURE: 98.5 F | OXYGEN SATURATION: 100 % | DIASTOLIC BLOOD PRESSURE: 60 MMHG | HEART RATE: 86 BPM | SYSTOLIC BLOOD PRESSURE: 110 MMHG

## 2019-12-30 DIAGNOSIS — N30.00 ACUTE CYSTITIS WITHOUT HEMATURIA: Primary | ICD-10-CM

## 2019-12-30 DIAGNOSIS — R82.90 NONSPECIFIC FINDING ON EXAMINATION OF URINE: ICD-10-CM

## 2019-12-30 DIAGNOSIS — R30.0 DYSURIA: ICD-10-CM

## 2019-12-30 LAB
ALBUMIN UR-MCNC: 30 MG/DL
APPEARANCE UR: ABNORMAL
BACTERIA #/AREA URNS HPF: ABNORMAL /HPF
BILIRUB UR QL STRIP: NEGATIVE
COLOR UR AUTO: YELLOW
GLUCOSE UR STRIP-MCNC: NEGATIVE MG/DL
HGB UR QL STRIP: ABNORMAL
KETONES UR STRIP-MCNC: NEGATIVE MG/DL
LEUKOCYTE ESTERASE UR QL STRIP: ABNORMAL
NITRATE UR QL: POSITIVE
NON-SQ EPI CELLS #/AREA URNS LPF: ABNORMAL /LPF
PH UR STRIP: 6 PH (ref 5–7)
RBC #/AREA URNS AUTO: ABNORMAL /HPF
SOURCE: ABNORMAL
SP GR UR STRIP: 1.02 (ref 1–1.03)
UROBILINOGEN UR STRIP-ACNC: 0.2 EU/DL (ref 0.2–1)
WBC #/AREA URNS AUTO: ABNORMAL /HPF

## 2019-12-30 PROCEDURE — 87186 SC STD MICRODIL/AGAR DIL: CPT | Performed by: INTERNAL MEDICINE

## 2019-12-30 PROCEDURE — 87086 URINE CULTURE/COLONY COUNT: CPT | Performed by: INTERNAL MEDICINE

## 2019-12-30 PROCEDURE — 99203 OFFICE O/P NEW LOW 30 MIN: CPT | Performed by: INTERNAL MEDICINE

## 2019-12-30 PROCEDURE — 87088 URINE BACTERIA CULTURE: CPT | Performed by: INTERNAL MEDICINE

## 2019-12-30 PROCEDURE — 81001 URINALYSIS AUTO W/SCOPE: CPT | Performed by: INTERNAL MEDICINE

## 2019-12-30 RX ORDER — LEVOTHYROXINE SODIUM 137 UG/1
TABLET ORAL
COMMUNITY
Start: 2019-12-05 | End: 2021-11-09

## 2019-12-30 RX ORDER — NITROFURANTOIN 25; 75 MG/1; MG/1
100 CAPSULE ORAL 2 TIMES DAILY
Qty: 10 CAPSULE | Refills: 0 | Status: SHIPPED | OUTPATIENT
Start: 2019-12-30 | End: 2020-03-17

## 2019-12-30 NOTE — PROGRESS NOTES
Subjective     Rocio Elkins is a 34 year old female who presents to clinic today for the following health issues:    HPI   URINARY TRACT SYMPTOMS  Onset: x one day    Description:   Painful urination (Dysuria): YES           Frequency: YES  Blood in urine (Hematuria): no   Delay in urine (Hesitency): no     Intensity: mild, moderate    Progression of Symptoms:  worsening    Accompanying Signs & Symptoms:  Fever/chills: no   Flank pain no   Nausea and vomiting: no   Any vaginal symptoms: none  Abdominal/Pelvic Pain: YES    History:   History of frequent UTI's: YES  History of kidney stones: no   Sexually Active: YES  Possibility of pregnancy: No    Precipitating factors:                   Reviewed and updated as needed this visit by Provider         Review of Systems   Const, GI,  reviewed,  otherwise negative unless noted above.        Objective    /60   Pulse 86   Temp 98.5  F (36.9  C) (Tympanic)   Wt 65.8 kg (145 lb)   SpO2 100%   BMI 22.71 kg/m    Body mass index is 22.71 kg/m .  Physical Exam   Gen: well appearing, pleasant woman, no distress  Pulm: breathing comfortably, CTAB, no wheezes or rales  CV: RRR, normal S1 and S2, no murmurs  Abd: BS present, soft, nontender, nondistended, no CVA tenderness       Diagnostic Test Results:  Results for orders placed or performed in visit on 12/30/19   *UA reflex to Microscopic and Culture ([place] and [facility] (except [place] and [place])     Status: Abnormal   Result Value Ref Range    Color Urine Yellow     Appearance Urine Slightly Cloudy     Glucose Urine Negative NEG^Negative mg/dL    Bilirubin Urine Negative NEG^Negative    Ketones Urine Negative NEG^Negative mg/dL    Specific Gravity Urine 1.025 1.003 - 1.035    Blood Urine Moderate (A) NEG^Negative    pH Urine 6.0 5.0 - 7.0 pH    Protein Albumin Urine 30 (A) NEG^Negative mg/dL    Urobilinogen Urine 0.2 0.2 - 1.0 EU/dL    Nitrite Urine Positive (A) NEG^Negative    Leukocyte Esterase  Urine Small (A) NEG^Negative    Source Midstream Urine    Urine Microscopic     Status: Abnormal   Result Value Ref Range    WBC Urine  (A) OTO5^0 - 5 /HPF    RBC Urine 5-10 (A) OTO2^O - 2 /HPF    Squamous Epithelial /LPF Urine Few FEW^Few /LPF    Bacteria Urine Moderate (A) NEG^Negative /HPF            Assessment & Plan     1. Acute cystitis without hematuria  UA consistent with infection.  She is essentially done breast feeding.  Will treat with macrobid, follow up culture   - nitroFURantoin macrocrystal-monohydrate (MACROBID) 100 MG capsule; Take 1 capsule (100 mg) by mouth 2 times daily  Dispense: 10 capsule; Refill: 0    2. Dysuria  - *UA reflex to Microscopic and Culture ([place] and [facility] (except [place] and [place])  - Urine Microscopic    3. Nonspecific finding on examination of urine  - Urine Culture Aerobic Bacterial       F/U as needed for persistent or worsening symptoms.        Fanny Desir MD  [facility] [place]

## 2019-12-31 ENCOUNTER — TELEPHONE (OUTPATIENT)
Dept: FAMILY MEDICINE | Facility: CLINIC | Age: 34
End: 2019-12-31

## 2019-12-31 DIAGNOSIS — Z20.828 EXPOSURE TO THE FLU: Primary | ICD-10-CM

## 2019-12-31 LAB
BACTERIA SPEC CULT: ABNORMAL
SPECIMEN SOURCE: ABNORMAL

## 2019-12-31 RX ORDER — OSELTAMIVIR PHOSPHATE 75 MG/1
75 CAPSULE ORAL DAILY
Qty: 10 CAPSULE | Refills: 0 | Status: SHIPPED | OUTPATIENT
Start: 2019-12-31 | End: 2020-03-17

## 2019-12-31 NOTE — TELEPHONE ENCOUNTER
Reason for Call:  Other call back    Detailed comments: Pt states spouse was seen at urgent care diagnosed with influenza b.  Pt seen yesterday for uti and is on an antibiotic.   Pt states she is starting to cough wondering if needs to have a medication such as Tamiflu.     Phone Number Patient can be reached at: Home number on file [phone] (home)    Best Time: anytime    Can we leave a detailed message on this number? YES    Call taken on 12/31/2019 at 11:04 AM by Bella Fleming

## 2019-12-31 NOTE — TELEPHONE ENCOUNTER
Spoke with patient who was seen yesterday with Dr. Desir and diagnosed with a UTI and prescribed Macrobid. Patients  was seen yesterday at an urgent care and was diagnosed with Influenza B. Patient is wondering since her  was diagnosed with this if she should be on a medication called Tamiflu as she is presenting with symptoms of an intermittent cough, chills, fatigue, muscle aches. Patient denies fever.     Routing to Covering Providers to advise. Can patient be on both medications (Macrobid and Tamiflu)?     Pharmacy pended. Thank you.     Hallie Macias RN, BSN  [facility] [place]

## 2019-12-31 NOTE — TELEPHONE ENCOUNTER
Called patient and informed her of MD response below. Patient verbalized understanding and agrees with plan.     Hallie Macias RN, BSN  [facility] [place]

## 2019-12-31 NOTE — TELEPHONE ENCOUNTER
Yes she can be on both Macrobid and Tamiflu.  since  as test positive influenza, even though her symptoms are not typical of flu we can start her on prophylactic dose rather than treatment dose.  Will send Tamiflu to her pharmacy.    75 mg daily X7 days

## 2020-01-16 PROBLEM — M54.6 THORACOLUMBAR BACK PAIN: Status: RESOLVED | Noted: 2019-11-21 | Resolved: 2020-01-16

## 2020-01-16 PROBLEM — M54.50 THORACOLUMBAR BACK PAIN: Status: RESOLVED | Noted: 2019-11-21 | Resolved: 2020-01-16

## 2020-01-16 NOTE — PROGRESS NOTES
Discharge Note    Progress reporting period is from last progress note on   to Dec 4, 2019.    Rocio failed to follow up and current status is unknown.  Please see information below for last relevant information on current status.  Patient seen for 3 visits.    SUBJECTIVE  Subjective changes noted by patient:  Has noted more LBP the past few days  .  Current pain level is  .     Previous pain level was  5/10.   Changes in function:  Yes (See Goal flowsheet attached for changes in current functional level)  Adverse reaction to treatment or activity: None    OBJECTIVE  Changes noted in objective findings: R trunk rotation WNL. Added cat-cow and bird dog. Trial of IFC per flow.     ASSESSMENT/PLAN  Diagnosis: T-L back pain   Updated problem list and treatment plan:   Pain - HEP  STG/LTGs have been met or progress has been made towards goals:  Yes, please see goal flowsheet for most current information  Assessment of Progress: current status is unknown.    Last current status: Pt is progressing as expected   Self Management Plans:  HEP  I have re-evaluated this patient and find that the nature, scope, duration and intensity of the therapy is appropriate for the medical condition of the patient.  Rocio continues to require the following intervention to meet STG and LTG's:  HEP.    Recommendations:  Discharge with current home program.  Patient to follow up with MD as needed.    Please refer to the daily flowsheet for treatment today, total treatment time and time spent performing 1:1 timed codes.

## 2020-03-06 ENCOUNTER — NURSE TRIAGE (OUTPATIENT)
Dept: FAMILY MEDICINE | Facility: CLINIC | Age: 35
End: 2020-03-06

## 2020-03-06 DIAGNOSIS — R68.89 FLU-LIKE SYMPTOMS: Primary | ICD-10-CM

## 2020-03-06 RX ORDER — OSELTAMIVIR PHOSPHATE 75 MG/1
75 CAPSULE ORAL 2 TIMES DAILY
Qty: 10 CAPSULE | Refills: 0 | Status: SHIPPED | OUTPATIENT
Start: 2020-03-06 | End: 2020-03-17

## 2020-03-06 NOTE — TELEPHONE ENCOUNTER
Called patient and informed her of MD response below. Patient verbalized understanding and agrees with plan.       Hallie Macias RN, BSN  [facility] [place]

## 2020-03-06 NOTE — TELEPHONE ENCOUNTER
Reason for Call:  Other prescription    Detailed comments: Pt is calling, wondering if she can get a flu preventative medication. States spouse, daughter, and mother in law have the flu. [facility] [address] in [place].    Phone Number Patient can be reached at: Cell number on file:    Telephone Information:   Mobile [phone]       Best Time: today    Can we leave a detailed message on this number? YES    Call taken on 3/6/2020 at 9:25 AM by Sarah Olvera

## 2020-03-06 NOTE — TELEPHONE ENCOUNTER
Non detailed message left for pt to return call to clinic and ask to speak with a triage nurse.    Katia RUBIO RN  EP Triage

## 2020-03-06 NOTE — TELEPHONE ENCOUNTER
She does not have any symptoms at the moment, and has been exposed for a few days now so may be in the clear. She does not have any medical indication for Tamiflu prophylaxis.     I would recommend continued hand hygeine, etc, and get the flu shot.       I did send in the treatment dose tamiflu that she can start if develops any symptoms, as this would probably be more beneficial than starting ppx without symptoms.       Fanny Desir MD

## 2020-03-06 NOTE — TELEPHONE ENCOUNTER
"Patient's  and daughter have been diagnosed with influenza A.  was diagnosed on 3/2. States that her mother in law is in the clinic now being tested.   Patient does not currently have symptoms. Patient is asking for prophylaxis.  Patient works as  at [employer].   Protocol for health care worker:  DISCUSS with PCP and CALLBACK by NURSE WITHIN 1 HOUR.  Pharmacy pended. Please advise.    Additional Information    Negative: Influenza has been diagnosed by a HCP    Negative: Influenza suspected (i.e., fever and respiratory symptoms; probable influenza exposure)    Negative: Cough and begins > 7 days after influenza EXPOSURE    Negative: Influenza EXPOSURE (close contact) within the last 7 days and fever or any respiratory symptoms (i.e., cough, runny or stuffy nose, sore throat)    Negative: Runny or stuffy nose and begins > 7 days after influenza EXPOSURE    Negative: Sore throat and begins > 7 days after influenza EXPOSURE    Negative: Patient sounds very sick or weak to the triager    Negative: Influenza EXPOSURE within last 72 hours (3 days) and exposed person is HIGH RISK (e.g., age > 64 years, pregnant, HIV+, chronic medical condition)    Influenza EXPOSURE within last 72 hours (3 days) and exposed person is a health care worker, public health worker, or  (EMS)    Answer Assessment - Initial Assessment Questions  1. TYPE of EXPOSURE: \"How were you exposed?\" (e.g., close contact, not a close contact)      3 people in her home have been with influenza A  2. DATE of EXPOSURE: \"When did the exposure occur?\" (e.g., hour, days, weeks)      3/1  3. PREGNANCY: \"Is there any chance you are pregnant?\" \"When was your last menstrual period?\"      3/5/20  4. HIGH RISK for COMPLICATIONS: \"Do you have any heart or lung problems? Do you have a weakened immune system?\" (e.g., CHF, COPD, asthma, HIV positive, chemotherapy, renal failure, diabetes mellitus, " "sickle cell anemia)      no  5. SYMPTOMS: \"Do you have any symptoms?\" (e.g., cough, fever, sore throat, difficulty breathing).      no    Protocols used: INFLUENZA EXPOSURE-A-[place]  Bibiana Rivas RN    "

## 2020-03-17 ENCOUNTER — OFFICE VISIT (OUTPATIENT)
Dept: FAMILY MEDICINE | Facility: CLINIC | Age: 35
End: 2020-03-17
Payer: COMMERCIAL

## 2020-03-17 VITALS
HEART RATE: 79 BPM | TEMPERATURE: 97.4 F | WEIGHT: 143 LBS | HEIGHT: 67 IN | OXYGEN SATURATION: 100 % | SYSTOLIC BLOOD PRESSURE: 118 MMHG | BODY MASS INDEX: 22.44 KG/M2 | DIASTOLIC BLOOD PRESSURE: 74 MMHG

## 2020-03-17 DIAGNOSIS — J01.90 ACUTE SINUSITIS WITH SYMPTOMS > 10 DAYS: Primary | ICD-10-CM

## 2020-03-17 DIAGNOSIS — K30 FUNCTIONAL DYSPEPSIA: ICD-10-CM

## 2020-03-17 PROCEDURE — 99214 OFFICE O/P EST MOD 30 MIN: CPT | Performed by: FAMILY MEDICINE

## 2020-03-17 RX ORDER — AMOXICILLIN 875 MG
875 TABLET ORAL 2 TIMES DAILY
Qty: 20 TABLET | Refills: 0 | Status: SHIPPED | OUTPATIENT
Start: 2020-03-17 | End: 2020-11-20

## 2020-03-17 RX ORDER — AMOXICILLIN 875 MG
875 TABLET ORAL 2 TIMES DAILY
Qty: 20 TABLET | Refills: 0 | Status: SHIPPED | OUTPATIENT
Start: 2020-03-17 | End: 2020-03-17

## 2020-03-17 ASSESSMENT — MIFFLIN-ST. JEOR: SCORE: 1371.27

## 2020-03-17 NOTE — PROGRESS NOTES
Subjective     Rocio Elkins is a 34 year old female who presents to clinic today for the following health issues:    HPI   Acute Illness   Acute illness concerns: Sinus Pressure and Stomach Bloating  Onset: Sinus Pressure X 1 week, Stomach bloating for many years off and on    Fever: no     Chills/Sweats: no     Headache (location?): YES, Forehead    Sinus Pressure:YES    Conjunctivitis:  no    Ear Pain: Yes, last night left ear    Rhinorrhea: no     Congestion: no     Sore Throat: no      Cough: no    Wheeze: no     Decreased Appetite: no     Nausea: no     Vomiting: no     Diarrhea:  no     Dysuria/Freq.: no     Fatigue/Achiness: no     Sick/Strep Exposure: no      Therapies Tried and outcome: Tylenol, Advil      Patient Active Problem List   Diagnosis     LTBI (latent tuberculosis infection)     Postablative hypothyroidism     Thyrotoxic exophthalmos     Hypovitaminosis D     History of diet controlled gestational diabetes mellitus (GDM)     Lactating mother     History of third degree perineal laceration     History of postpartum hemorrhage     Past Surgical History:   Procedure Laterality Date     NO HISTORY OF SURGERY         Social History     Tobacco Use     Smoking status: Never Smoker     Smokeless tobacco: Never Used   Substance Use Topics     Alcohol use: No     No family history on file.      Current Outpatient Medications   Medication Sig Dispense Refill     amoxicillin (AMOXIL) 875 MG tablet Take 1 tablet (875 mg) by mouth 2 times daily 20 tablet 0     cholecalciferol (VITAMIN D3) 1000 UNIT tablet Take 1,000 Units by mouth       levothyroxine (SYNTHROID/LEVOTHROID) 137 MCG tablet        omeprazole (PRILOSEC) 20 MG DR capsule Take 1 capsule (20 mg) by mouth daily 30 capsule 0     Allergies   Allergen Reactions     Pseudoephedrine Other (See Comments)     Dizziness, leg weakness  Other reaction(s): Other, see comments  Dizziness, leg weakness     Recent Labs   Lab Test 04/22/19  1432 03/14/19  1028   "10/04/18  1430 09/25/18  1300   A1C  --   --   --  5.4  --    CR  --   --   --   --  0.62   GFRESTIMATED  --   --   --   --  >90   GFRESTBLACK  --   --   --   --  >90   POTASSIUM  --   --   --   --  3.6   TSH 0.17* 0.14*   < > 15.71*  --     < > = values in this interval not displayed.      BP Readings from Last 3 Encounters:   03/17/20 118/74   12/30/19 110/60   10/28/19 112/64    Wt Readings from Last 3 Encounters:   03/17/20 64.9 kg (143 lb)   12/30/19 65.8 kg (145 lb)   10/28/19 67.1 kg (148 lb)          Reviewed and updated as needed this visit by Provider         Review of Systems   ROS COMP: Constitutional, HEENT, cardiovascular, pulmonary, gi and gu systems are negative, except as otherwise noted.      Objective    /74   Pulse 79   Temp 97.4  F (36.3  C) (Tympanic)   Ht 1.702 m (5' 7\")   Wt 64.9 kg (143 lb)   SpO2 100%   BMI 22.40 kg/m    Body mass index is 22.4 kg/m .  Physical Exam   GENERAL: healthy, alert and no distress  EYES: Eyes grossly normal to inspection, PERRL and conjunctivae and sclerae normal  HENT: normal cephalic/atraumatic, both ears: bulging membrane, nasal mucosa edematous , rhinorrhea yellow, oropharxnx crowded and sinuses: maxillary swelling on bilateral  NECK: cervical adenopathy left side, no asymmetry, masses, or scars and thyroid normal to palpation  RESP: lungs clear to auscultation - no rales, rhonchi or wheezes  CV: regular rate and rhythm, normal S1 S2, no S3 or S4, no murmur, click or rub, no peripheral edema and peripheral pulses strong  ABDOMEN: soft, nontender, no hepatosplenomegaly, no masses and bowel sounds normal  MS: no gross musculoskeletal defects noted, no edema            Assessment & Plan     1. Acute sinusitis with symptoms > 10 days  Has purulent postnasal drainage with sinus tenderness, will have her to try abx   - amoxicillin (AMOXIL) 875 MG tablet; Take 1 tablet (875 mg) by mouth 2 times daily  Dispense: 20 tablet; Refill: 0    2. Functional " dyspepsia  Has bloating with indigestion  Will have her to try PPI for next 4 weeks   - omeprazole (PRILOSEC) 20 MG DR capsule; Take 1 capsule (20 mg) by mouth daily  Dispense: 30 capsule; Refill: 0       FUTURE APPOINTMENTS:       - Follow-up visit in 4-6 weeks  If abd not improving     No follow-ups on file.    Viet Benavidez MD  [facility]

## 2020-03-31 ENCOUNTER — TELEPHONE (OUTPATIENT)
Dept: FAMILY MEDICINE | Facility: CLINIC | Age: 35
End: 2020-03-31

## 2020-03-31 DIAGNOSIS — J01.90 ACUTE SINUSITIS WITH SYMPTOMS > 10 DAYS: Primary | ICD-10-CM

## 2020-03-31 RX ORDER — AZITHROMYCIN 250 MG/1
TABLET, FILM COATED ORAL
Qty: 6 TABLET | Refills: 0 | Status: SHIPPED | OUTPATIENT
Start: 2020-03-31 | End: 2020-11-20

## 2020-03-31 NOTE — TELEPHONE ENCOUNTER
Detailed message left with info from provider and return number to call with any questions or concerns.    Katia RUBIO RN  EP Triage

## 2020-03-31 NOTE — TELEPHONE ENCOUNTER
Chart reviewed.  I would like her to start azithromycin.  Medication ordered.  Stay well-hydrated.  Also start using an antihistamine like OTC zyrtec or claritin to reduce the nasal and sinus congestion and drainage.      Kezia Cali MD  [facility] [place]

## 2020-03-31 NOTE — TELEPHONE ENCOUNTER
Patient was seen 3/17/20 by Dr. Benavidez and given amoxicillin. She has had sinus sxs x 3 weeks. She has sinus pressure and colored drainage. No cough of fever. Headache has improved. Denies hx of allergies or chronic nasal congestion.     3/17/20 OV note:    1. Acute sinusitis with symptoms > 10 days  Has purulent postnasal drainage with sinus tenderness, will have her to try abx   - amoxicillin (AMOXIL) 875 MG tablet; Take 1 tablet (875 mg) by mouth 2 times daily  Dispense: 20 tablet; Refill: 0    Olivia Springer RN   [facility] - Triage

## 2020-03-31 NOTE — TELEPHONE ENCOUNTER
Patient states she improved somewhat while on course of antibiotics but symptoms have resolved since stopping. She is afebrile but wondering if she needs another course of medication. Pharmacy pended, please advise.     Olivia Springer RN   [facility] - Triage

## 2020-03-31 NOTE — TELEPHONE ENCOUNTER
Reason for Call:  Other call back    Detailed comments: Pt was given antibiotics for sinus problems 3/17/20. Still having symptoms. Pt is wondering if she should be prescribed a new prescription or what Dr. Benavidez recommends. Thanks!    Phone Number Patient can be reached at: Cell number on file:    Telephone Information:   Mobile [phone]       Best Time: any    Can we leave a detailed message on this number? YES    Call taken on 3/31/2020 at 1:28 PM by Sarah Olvera

## 2020-03-31 NOTE — TELEPHONE ENCOUNTER
Need to know what are sx that are bothering her still  ? Sinus pain, pressure ? Colored  drainage ? Fever, cough?   Any hx of chronic nasal congestion? Allergies? Etc

## 2020-04-13 DIAGNOSIS — K30 FUNCTIONAL DYSPEPSIA: ICD-10-CM

## 2020-04-14 NOTE — TELEPHONE ENCOUNTER
Prescription approved per [facility] Refill Protocol.    Hallie Macias RN, BSN  [facility]

## 2020-04-27 DIAGNOSIS — E89.0 POSTABLATIVE HYPOTHYROIDISM: Primary | ICD-10-CM

## 2020-04-28 DIAGNOSIS — E89.0 POSTABLATIVE HYPOTHYROIDISM: ICD-10-CM

## 2020-04-28 PROCEDURE — 36415 COLL VENOUS BLD VENIPUNCTURE: CPT | Performed by: INTERNAL MEDICINE

## 2020-04-28 PROCEDURE — 84443 ASSAY THYROID STIM HORMONE: CPT | Performed by: INTERNAL MEDICINE

## 2020-04-28 PROCEDURE — 84439 ASSAY OF FREE THYROXINE: CPT | Performed by: INTERNAL MEDICINE

## 2020-04-29 LAB
T4 FREE SERPL-MCNC: 1.88 NG/DL (ref 0.76–1.46)
TSH SERPL DL<=0.005 MIU/L-ACNC: <0.01 MU/L (ref 0.4–4)

## 2020-05-04 ENCOUNTER — NURSE TRIAGE (OUTPATIENT)
Dept: NURSING | Facility: CLINIC | Age: 35
End: 2020-05-04

## 2020-05-04 NOTE — TELEPHONE ENCOUNTER
"Caller is \"Dick\" from the [facility].  Calling to transfer [employer] employee 'Lolis' to ensure that phone transferring is now working ....  Success.  No further action required for this encounter.    [facility] Nurse Advisor     Reason for Disposition    General information question, no triage required and triager able to answer question    Protocols used: INFORMATION ONLY CALL-A-AH      "

## 2020-05-11 ENCOUNTER — RESULTS ONLY (OUTPATIENT)
Dept: LAB | Age: 35
End: 2020-05-11

## 2020-05-11 ENCOUNTER — APPOINTMENT (OUTPATIENT)
Dept: LAB | Facility: CLINIC | Age: 35
End: 2020-05-11
Payer: COMMERCIAL

## 2020-05-12 LAB
COVID-19 SPIKE RBD ABY TITER: NORMAL
COVID-19 SPIKE RBD ABY: NEGATIVE

## 2020-08-05 DIAGNOSIS — E89.0 POSTABLATIVE HYPOTHYROIDISM: Primary | ICD-10-CM

## 2020-08-10 DIAGNOSIS — E89.0 POSTABLATIVE HYPOTHYROIDISM: ICD-10-CM

## 2020-08-10 PROCEDURE — 84443 ASSAY THYROID STIM HORMONE: CPT | Mod: 59 | Performed by: INTERNAL MEDICINE

## 2020-08-10 PROCEDURE — 36415 COLL VENOUS BLD VENIPUNCTURE: CPT | Performed by: INTERNAL MEDICINE

## 2020-08-10 PROCEDURE — 84443 ASSAY THYROID STIM HORMONE: CPT | Performed by: INTERNAL MEDICINE

## 2020-08-10 PROCEDURE — 84439 ASSAY OF FREE THYROXINE: CPT | Performed by: INTERNAL MEDICINE

## 2020-08-11 LAB
T4 FREE SERPL-MCNC: 1.87 NG/DL (ref 0.76–1.46)
TSH SERPL DL<=0.005 MIU/L-ACNC: <0.01 MU/L (ref 0.4–4)
TSH SERPL DL<=0.005 MIU/L-ACNC: <0.01 MU/L (ref 0.4–4)

## 2020-10-21 DIAGNOSIS — E89.0 POSTSURGICAL HYPOTHYROIDISM: Primary | ICD-10-CM

## 2020-11-11 DIAGNOSIS — E89.0 POSTSURGICAL HYPOTHYROIDISM: ICD-10-CM

## 2020-11-11 LAB — T3FREE SERPL-MCNC: 2.5 PG/ML (ref 2.3–4.2)

## 2020-11-11 PROCEDURE — 84481 FREE ASSAY (FT-3): CPT | Performed by: INTERNAL MEDICINE

## 2020-11-11 PROCEDURE — 84439 ASSAY OF FREE THYROXINE: CPT | Performed by: INTERNAL MEDICINE

## 2020-11-11 PROCEDURE — 84443 ASSAY THYROID STIM HORMONE: CPT | Performed by: INTERNAL MEDICINE

## 2020-11-12 LAB
T4 FREE SERPL-MCNC: 1.51 NG/DL (ref 0.76–1.46)
TSH SERPL DL<=0.005 MIU/L-ACNC: 0.01 MU/L (ref 0.4–4)

## 2020-11-20 ENCOUNTER — OFFICE VISIT (OUTPATIENT)
Dept: FAMILY MEDICINE | Facility: CLINIC | Age: 35
End: 2020-11-20
Payer: COMMERCIAL

## 2020-11-20 VITALS — SYSTOLIC BLOOD PRESSURE: 112 MMHG | DIASTOLIC BLOOD PRESSURE: 74 MMHG

## 2020-11-20 DIAGNOSIS — L72.0 EPIDERMAL CYST: Primary | ICD-10-CM

## 2020-11-20 PROCEDURE — 87070 CULTURE OTHR SPECIMN AEROBIC: CPT | Performed by: PHYSICIAN ASSISTANT

## 2020-11-20 PROCEDURE — 99203 OFFICE O/P NEW LOW 30 MIN: CPT | Mod: 25 | Performed by: PHYSICIAN ASSISTANT

## 2020-11-20 PROCEDURE — 10060 I&D ABSCESS SIMPLE/SINGLE: CPT | Performed by: PHYSICIAN ASSISTANT

## 2020-11-20 NOTE — PATIENT INSTRUCTIONS
WOUND CARE INSTRUCTIONS  for  Cyst Draining          1) Wash area daily with gentle soap and water. Apply Vaseline and cover area daily with gauze. Cyst may drain for the next few days.    2) Once area stops draining and incision has healed you may resume your regular skin care routine, including washing with mild soap and water, applying moisturizer, make-up and sunscreen.    3) If there are any open or bleeding areas at the incision you should continue to cover the area with a bandage daily as follows:    1) Clean and dry the area with plain tap water using a Q-tip or sterile gauze pad.  2) Apply Polysporin or Bacitracin ointment to the open area.  3) Cover the wound with a band-aid or a sterile non-stick gauze pad and micropore paper tape.         SIGNS OF INFECTION  - If you notice any of these signs of infection, call your doctor right away: expanding redness around the wound.  - Yellow or greenish-colored pus or cloudy wound drainage.    - Red streaking spreading from the wound.  - Increased swelling, tenderness, or pain around the wound.   - Fever.    Please remember that yellow and clear drainage from a wound can be normal and related to normal wound healing.  Isolated drainage from a wound without a combination of the above features does not indicate infection.       *Once the bandages are removed, the scar may be red and firm. This is normal and will fade in time. It might take 6-12 months for this to happen.     *Numbness in the surgical area is expected. It might take 12-18 months for the feeling to return to normal. During this time sensations of itchiness, tingling and occasional sharp pains might be noted. These feelings are normal and will subside once the nerves have completely healed.       If you were seen in [place] call : [phone]    If you were seen in [place] call: [phone]        Proper skin care from [facility]:    -Eliminate harsh soaps as they strip the natural  oils from the skin, often resulting in dry itchy skin ( i.e. Dial, Zest, [place])  -Use mild soaps such as Cetaphil or Dove Sensitive Skin in the shower. You do not need to use soap on arms, legs, and trunk every time you shower unless visibly soiled.   -Avoid hot or cold showers.  -After showering, lightly dry off and apply moisturizing within 2-3 minutes. This will help trap moisture in the skin.   -Aggressive use of a moisturizer at least 1-2 times a day to the entire body (including -Vanicream, Cetaphil, Aquaphor or Cerave) and moisturize hands after every washing.  -We recommend using moisturizers that come in a tub that needs to be scooped out, not a pump. This has more of an oil base. It will hold moisture in your skin much better than a water base moisturizer. The above recommended are non-pore clogging.      Wear a sunscreen with at least SPF 30 on your face, ears, neck and V of the chest daily. Wear sunscreen on other areas of the body if those areas are exposed to the sun throughout the day. Sunscreens can contain physical and/or chemical blockers. Physical blockers are less likely to clog pores, these include zinc oxide and titanium dioxide. Reapply every two hour and after swimming. Sunscreen examples include Neutrogena, CeraVe, Blue Lizard, Elta MD and many others.    UV radiation  UVA radiation remains constant throughout the day and throughout the year. It is a longer wavelength than UVB and therefore penetrates deeper into the skin leading to immediate and delayed tanning, photoaging, and skin cancer. 70-80% of UVA and UVB radiation occurs between the hours of 10am-2pm.  UVB radiation  UVB radiation causes the most harmful effects and is more significant during the summer months. However, snow and ice can reflect UVB radiation leading to skin damage during the winter months as well. UVB radiation is responsible for tanning, burning, inflammation, delayed erythema (pinkness), pigmentation (brown  spots), and skin cancer.     I recommend self monthly full body exams and yearly full body exams with a dermatology provider. If you develop a new or changing lesion please follow up for examination. Most skin cancers are pink and scaly or pink and pearly. However, we do see blue/brown/black skin cancers.  Consider the ABCDEs of melanoma when giving yourself your monthly full body exam ( don't forget the groin, buttocks, feet, toes, etc). A-asymmetry, B-borders, C-color, D-diameter, E-elevation or evolving. If you see any of these changes please follow up in clinic. If you cannot see your back I recommend purchasing a hand held mirror to use with a larger wall mirror.

## 2020-11-20 NOTE — LETTER
11/20/2020         RE: Rocio Elkins  [address]  [place] [address]        Dear Colleague,    Thank you for referring your patient, Rocio Elkins, to the [facility]. Please see a copy of my visit note below.    HPI:  Rocio Elkins is a 34 year old female patient here today for growth on left jawline .  Patient states this has been present for years but has recently enlarged.  Patient reports the following symptoms: painful, enlarged, bothersome .  Patient reports the following previous treatments: none.  Patient reports the following modifying factors: none.  Associated symptoms: none.  Patient has no other skin complaints today.  Remainder of the HPI, Meds, PMH, Allergies, FH, and SH was reviewed in chart.      Past Medical History:   Diagnosis Date     Diabetes (H)     Gestational diabetes diet controlled     Hyperemesis gravidarum, antepartum 10/5/2018     Thyroid disease        Past Surgical History:   Procedure Laterality Date     NO HISTORY OF SURGERY          History reviewed. No pertinent family history.    Social History     Socioeconomic History     Marital status:      Spouse name: Not on file     Number of children: 0     Years of education: Not on file     Highest education level: Not on file   Occupational History     Employer: [employer]   Social Needs     Financial resource strain: Not on file     Food insecurity     Worry: Not on file     Inability: Not on file     Transportation needs     Medical: Not on file     Non-medical: Not on file   Tobacco Use     Smoking status: Never Smoker     Smokeless tobacco: Never Used   Substance and Sexual Activity     Alcohol use: No     Drug use: No     Sexual activity: Not Currently     Partners: Male     Birth control/protection: None   Lifestyle     Physical activity     Days per week: Not on file     Minutes per session: Not on file     Stress: Not on file   Relationships      Social connections     Talks on phone: Not on file     Gets together: Not on file     Attends Adventism service: Not on file     Active member of club or organization: Not on file     Attends meetings of clubs or organizations: Not on file     Relationship status: Not on file     Intimate partner violence     Fear of current or ex partner: Not on file     Emotionally abused: Not on file     Physically abused: Not on file     Forced sexual activity: Not on file   Other Topics Concern     Parent/sibling w/ CABG, MI or angioplasty before 65F 55M? Not Asked   Social History Narrative     Not on file       Outpatient Encounter Medications as of 11/20/2020   Medication Sig Dispense Refill     levothyroxine (SYNTHROID/LEVOTHROID) 137 MCG tablet        cholecalciferol (VITAMIN D3) 1000 UNIT tablet Take 1,000 Units by mouth       omeprazole (PRILOSEC) 20 MG DR capsule TAKE 1 CAPSULE(20 MG) BY MOUTH DAILY (Patient not taking: Reported on 11/20/2020) 90 capsule 0     [DISCONTINUED] amoxicillin (AMOXIL) 875 MG tablet Take 1 tablet (875 mg) by mouth 2 times daily (Patient not taking: Reported on 11/20/2020) 20 tablet 0     [DISCONTINUED] azithromycin (ZITHROMAX) 250 MG tablet Two tablets first day, then one tablet daily for four days. (Patient not taking: Reported on 11/20/2020) 6 tablet 0     No facility-administered encounter medications on file as of 11/20/2020.        Review Of Systems:  Skin: growth  Eyes: negative  Ears/Nose/Throat: negative  Respiratory: No shortness of breath, dyspnea on exertion, cough, or hemoptysis  Cardiovascular: negative  Gastrointestinal: negative  Genitourinary: negative  Musculoskeletal: negative  Neurologic: negative  Psychiatric: negative  Hematologic/Lymphatic/Immunologic: negative  Endocrine: negative      Objective:     /74   Breastfeeding No   Eyes: Conjunctivae/lids: Normal   ENT: Lips:  Normal  MSK: Normal  Cardiovascular: Peripheral edema none  Pulm: Breathing  Normal  Neuro/Psych: Orientation: A/O x 3 Normal; Mood/Affect: Normal, NAD, WDWN  Pt accompanied by: self  Following areas examined: self, face, neck ( wearing mask)  Chiu skin type:v   Findings:  Inflamed Soft mobile smooth nodule on left jawline 3.0cm    Assessment and Plan:  1) inflamed epidermal cyst   Disc drain today  Disc excision with Dr. Shoemaker in the future once area has calmed down.   Aerobic culture  I&D: Epidermal cyst was cleansed with surgical cleanser. It was infiltrated with buffered solution of 1% lidocaine with epinephrine. An incision was made with a number 11 blade over the top of the lesion, curette used to dislodge from surrounding tissue, and hemostat used to remove cyst sac and contents. copious yellow keratinous foul smelling drainage with minimal blood was expressed.   Dressing was applied. Patient was discharged in satisfactory condition.   Based on lesion type may need to completely remove lesion. Wound care directions given.      Follow up in prn        Again, thank you for allowing me to participate in the care of your patient.        Sincerely,        Eduarda Hensley PA-C

## 2020-11-20 NOTE — PROGRESS NOTES
HPI:  Rocio Elkins is a 34 year old female patient here today for growth on left jawline .  Patient states this has been present for years but has recently enlarged.  Patient reports the following symptoms: painful, enlarged, bothersome .  Patient reports the following previous treatments: none.  Patient reports the following modifying factors: none.  Associated symptoms: none.  Patient has no other skin complaints today.  Remainder of the HPI, Meds, PMH, Allergies, FH, and SH was reviewed in chart.      Past Medical History:   Diagnosis Date     Diabetes (H)     Gestational diabetes diet controlled     Hyperemesis gravidarum, antepartum 10/5/2018     Thyroid disease        Past Surgical History:   Procedure Laterality Date     NO HISTORY OF SURGERY          History reviewed. No pertinent family history.    Social History     Socioeconomic History     Marital status:      Spouse name: Not on file     Number of children: 0     Years of education: Not on file     Highest education level: Not on file   Occupational History     Employer: [employer]   Social Needs     Financial resource strain: Not on file     Food insecurity     Worry: Not on file     Inability: Not on file     Transportation needs     Medical: Not on file     Non-medical: Not on file   Tobacco Use     Smoking status: Never Smoker     Smokeless tobacco: Never Used   Substance and Sexual Activity     Alcohol use: No     Drug use: No     Sexual activity: Not Currently     Partners: Male     Birth control/protection: None   Lifestyle     Physical activity     Days per week: Not on file     Minutes per session: Not on file     Stress: Not on file   Relationships     Social connections     Talks on phone: Not on file     Gets together: Not on file     Attends Yazidi service: Not on file     Active member of club or organization: Not on file     Attends meetings of clubs or organizations: Not on file     Relationship status: Not on  file     Intimate partner violence     Fear of current or ex partner: Not on file     Emotionally abused: Not on file     Physically abused: Not on file     Forced sexual activity: Not on file   Other Topics Concern     Parent/sibling w/ CABG, MI or angioplasty before 65F 55M? Not Asked   Social History Narrative     Not on file       Outpatient Encounter Medications as of 11/20/2020   Medication Sig Dispense Refill     levothyroxine (SYNTHROID/LEVOTHROID) 137 MCG tablet        cholecalciferol (VITAMIN D3) 1000 UNIT tablet Take 1,000 Units by mouth       omeprazole (PRILOSEC) 20 MG DR capsule TAKE 1 CAPSULE(20 MG) BY MOUTH DAILY (Patient not taking: Reported on 11/20/2020) 90 capsule 0     [DISCONTINUED] amoxicillin (AMOXIL) 875 MG tablet Take 1 tablet (875 mg) by mouth 2 times daily (Patient not taking: Reported on 11/20/2020) 20 tablet 0     [DISCONTINUED] azithromycin (ZITHROMAX) 250 MG tablet Two tablets first day, then one tablet daily for four days. (Patient not taking: Reported on 11/20/2020) 6 tablet 0     No facility-administered encounter medications on file as of 11/20/2020.        Review Of Systems:  Skin: growth  Eyes: negative  Ears/Nose/Throat: negative  Respiratory: No shortness of breath, dyspnea on exertion, cough, or hemoptysis  Cardiovascular: negative  Gastrointestinal: negative  Genitourinary: negative  Musculoskeletal: negative  Neurologic: negative  Psychiatric: negative  Hematologic/Lymphatic/Immunologic: negative  Endocrine: negative      Objective:     /74   Breastfeeding No   Eyes: Conjunctivae/lids: Normal   ENT: Lips:  Normal  MSK: Normal  Cardiovascular: Peripheral edema none  Pulm: Breathing Normal  Neuro/Psych: Orientation: A/O x 3 Normal; Mood/Affect: Normal, NAD, WDWN  Pt accompanied by: self  Following areas examined: self, face, neck ( wearing mask)  Chiu skin type:v   Findings:  Inflamed Soft mobile smooth nodule on left jawline 3.0cm    Assessment and Plan:  1)  inflamed epidermal cyst   Disc drain today  Disc excision with Dr. Shoemaker in the future once area has calmed down.   Aerobic culture  I&D: Epidermal cyst was cleansed with surgical cleanser. It was infiltrated with buffered solution of 1% lidocaine with epinephrine. An incision was made with a number 11 blade over the top of the lesion, curette used to dislodge from surrounding tissue, and hemostat used to remove cyst sac and contents. copious yellow keratinous foul smelling drainage with minimal blood was expressed.   Dressing was applied. Patient was discharged in satisfactory condition.   Based on lesion type may need to completely remove lesion. Wound care directions given.      Follow up in prn

## 2020-11-22 LAB
BACTERIA SPEC CULT: NORMAL
Lab: NORMAL
SPECIMEN SOURCE: NORMAL

## 2020-12-18 ENCOUNTER — OFFICE VISIT (OUTPATIENT)
Dept: FAMILY MEDICINE | Facility: CLINIC | Age: 35
End: 2020-12-18
Payer: COMMERCIAL

## 2020-12-18 VITALS
SYSTOLIC BLOOD PRESSURE: 108 MMHG | DIASTOLIC BLOOD PRESSURE: 72 MMHG | HEART RATE: 59 BPM | TEMPERATURE: 97.9 F | WEIGHT: 145 LBS | BODY MASS INDEX: 22.76 KG/M2 | OXYGEN SATURATION: 100 % | HEIGHT: 67 IN

## 2020-12-18 DIAGNOSIS — Z00.00 ROUTINE GENERAL MEDICAL EXAMINATION AT A HEALTH CARE FACILITY: Primary | ICD-10-CM

## 2020-12-18 DIAGNOSIS — E89.0 POSTABLATIVE HYPOTHYROIDISM: ICD-10-CM

## 2020-12-18 DIAGNOSIS — Z86.32 HISTORY OF DIET CONTROLLED GESTATIONAL DIABETES MELLITUS (GDM): ICD-10-CM

## 2020-12-18 PROCEDURE — 99395 PREV VISIT EST AGE 18-39: CPT | Performed by: FAMILY MEDICINE

## 2020-12-18 ASSESSMENT — MIFFLIN-ST. JEOR: SCORE: 1375.35

## 2020-12-18 NOTE — PROGRESS NOTES
SUBJECTIVE:   CC: Rocio Elkins is an 35 year old woman who presents for preventive health visit.     Patient has been advised of split billing requirements and indicates understanding: Yes  Healthy Habits:    Do you get at least three servings of calcium containing foods daily (dairy, green leafy vegetables, etc.)? yes    Amount of exercise or daily activities, outside of work: 0 day(s) per week    Problems taking medications regularly No    Medication side effects: No    Have you had an eye exam in the past two years? no    Do you see a dentist twice per year? yes    Do you have sleep apnea, excessive snoring or daytime drowsiness?no        Today's PHQ-2 Score:   PHQ-2 ( 1999 Pfizer) 12/18/2020 12/30/2019   Q1: Little interest or pleasure in doing things 0 0   Q2: Feeling down, depressed or hopeless 0 0   PHQ-2 Score 0 0       Abuse: Current or Past(Physical, Sexual or Emotional)- No  Do you feel safe in your environment? Yes        Social History     Tobacco Use     Smoking status: Never Smoker     Smokeless tobacco: Never Used   Substance Use Topics     Alcohol use: No     If you drink alcohol do you typically have >3 drinks per day or >7 drinks per week? No                     Reviewed orders with patient.  Reviewed health maintenance and updated orders accordingly - Yes  BP Readings from Last 3 Encounters:   12/18/20 108/72   11/20/20 112/74   03/17/20 118/74    Wt Readings from Last 3 Encounters:   12/18/20 65.8 kg (145 lb)   03/17/20 64.9 kg (143 lb)   12/30/19 65.8 kg (145 lb)                  Patient Active Problem List   Diagnosis     LTBI (latent tuberculosis infection)     Postablative hypothyroidism     Thyrotoxic exophthalmos     Hypovitaminosis D     History of diet controlled gestational diabetes mellitus (GDM)     Lactating mother     History of third degree perineal laceration     History of postpartum hemorrhage     Past Surgical History:   Procedure Laterality Date     NO HISTORY OF SURGERY          Social History     Tobacco Use     Smoking status: Never Smoker     Smokeless tobacco: Never Used   Substance Use Topics     Alcohol use: No     No family history on file.      Current Outpatient Medications   Medication Sig Dispense Refill     cholecalciferol (VITAMIN D3) 1000 UNIT tablet Take 1,000 Units by mouth       levothyroxine (SYNTHROID/LEVOTHROID) 137 MCG tablet        omeprazole (PRILOSEC) 20 MG DR capsule TAKE 1 CAPSULE(20 MG) BY MOUTH DAILY (Patient not taking: Reported on 11/20/2020) 90 capsule 0     Allergies   Allergen Reactions     Pseudoephedrine Other (See Comments)     Dizziness, leg weakness  Other reaction(s): Other, see comments  Dizziness, leg weakness     Recent Labs   Lab Test 11/11/20  1349 08/10/20  1627 10/04/18  1430 10/04/18  1430 09/25/18  1300   A1C  --   --   --  5.4  --    CR  --   --   --   --  0.62   GFRESTIMATED  --   --   --   --  >90   GFRESTBLACK  --   --   --   --  >90   POTASSIUM  --   --   --   --  3.6   TSH 0.01* <0.01*  <0.01*   < > 15.71*  --     < > = values in this interval not displayed.        Mammogram not appropriate for this patient based on age.    Pertinent mammograms are reviewed under the imaging tab.  History of abnormal Pap smear: NO - age 30- 65 PAP every 3 years recommended     Reviewed and updated as needed this visit by clinical staff   Allergies  Meds              Reviewed and updated as needed this visit by Provider                Past Medical History:   Diagnosis Date     Diabetes (H)     Gestational diabetes diet controlled     Hyperemesis gravidarum, antepartum 10/5/2018     Thyroid disease       Past Surgical History:   Procedure Laterality Date     NO HISTORY OF SURGERY         ROS:  CONSTITUTIONAL: NEGATIVE for fever, chills, change in weight  INTEGUMENTARU/SKIN: NEGATIVE for worrisome rashes, moles or lesions  EYES: NEGATIVE for vision changes or irritation  ENT: NEGATIVE for ear, mouth and throat problems  RESP: NEGATIVE for  "significant cough or SOB  BREAST: NEGATIVE for masses, tenderness or discharge  CV: NEGATIVE for chest pain, palpitations or peripheral edema  GI: NEGATIVE for nausea, abdominal pain, heartburn, or change in bowel habits  : NEGATIVE for unusual urinary or vaginal symptoms. Periods are regular.  MUSCULOSKELETAL: NEGATIVE for significant arthralgias or myalgia  NEURO: NEGATIVE for weakness, dizziness or paresthesias  PSYCHIATRIC: NEGATIVE for changes in mood or affect    OBJECTIVE:   /72   Pulse 59   Temp 97.9  F (36.6  C) (Tympanic)   Ht 1.702 m (5' 7\")   Wt 65.8 kg (145 lb)   LMP 12/13/2020   SpO2 100%   BMI 22.71 kg/m    EXAM:  GENERAL: healthy, alert and no distress  EYES: Eyes grossly normal to inspection, PERRL and conjunctivae and sclerae normal  HENT: ear canals and TM's normal, nose and mouth without ulcers or lesions  NECK: no adenopathy, no asymmetry, masses, or scars and thyroid normal to palpation  RESP: lungs clear to auscultation - no rales, rhonchi or wheezes  CV: regular rate and rhythm, normal S1 S2, no S3 or S4, no murmur, click or rub, no peripheral edema and peripheral pulses strong  ABDOMEN: soft, nontender, no hepatosplenomegaly, no masses and bowel sounds normal  MS: no gross musculoskeletal defects noted, no edema  SKIN: no suspicious lesions or rashes  NEURO: Normal strength and tone, mentation intact and speech normal  BACK: no CVA tenderness, no paralumbar tenderness  PSYCH: mentation appears normal, affect normal/bright  LYMPH: no cervical, supraclavicular, axillary, or inguinal adenopathy        ASSESSMENT/PLAN:       ICD-10-CM    1. Routine general medical examination at a health care facility  Z00.00 INFLUENZA VACCINE IM > 6 MONTHS VALENT IIV4 [72209]     CBC with platelets     Comprehensive metabolic panel (BMP + Alb, Alk Phos, ALT, AST, Total. Bili, TP)     Lipid panel reflex to direct LDL Fasting   2. Postablative hypothyroidism  E89.0    3. History of diet controlled " "gestational diabetes mellitus (GDM)  Z86.32        Patient has been advised of split billing requirements and indicates understanding: Yes  COUNSELING:   Reviewed preventive health counseling, as reflected in patient instructions    Estimated body mass index is 22.71 kg/m  as calculated from the following:    Height as of this encounter: 1.702 m (5' 7\").    Weight as of this encounter: 65.8 kg (145 lb).        She reports that she has never smoked. She has never used smokeless tobacco.      Counseling Resources:  ATP IV Guidelines  Pooled Cohorts Equation Calculator  Breast Cancer Risk Calculator  BRCA-Related Cancer Risk Assessment: FHS-7 Tool  FRAX Risk Assessment  ICSI Preventive Guidelines  Dietary Guidelines for Americans, 2010  USDA's MyPlate  ASA Prophylaxis  Lung CA Screening    Viet Benavidez MD  [facility]  "

## 2020-12-21 DIAGNOSIS — Z00.00 ROUTINE GENERAL MEDICAL EXAMINATION AT A HEALTH CARE FACILITY: ICD-10-CM

## 2020-12-21 LAB
ALBUMIN SERPL-MCNC: 3.9 G/DL (ref 3.4–5)
ALP SERPL-CCNC: 74 U/L (ref 40–150)
ALT SERPL W P-5'-P-CCNC: 22 U/L (ref 0–50)
ANION GAP SERPL CALCULATED.3IONS-SCNC: 5 MMOL/L (ref 3–14)
AST SERPL W P-5'-P-CCNC: 13 U/L (ref 0–45)
BILIRUB SERPL-MCNC: 0.5 MG/DL (ref 0.2–1.3)
BUN SERPL-MCNC: 7 MG/DL (ref 7–30)
CALCIUM SERPL-MCNC: 8.5 MG/DL (ref 8.5–10.1)
CHLORIDE SERPL-SCNC: 109 MMOL/L (ref 94–109)
CHOLEST SERPL-MCNC: 148 MG/DL
CO2 SERPL-SCNC: 24 MMOL/L (ref 20–32)
CREAT SERPL-MCNC: 0.64 MG/DL (ref 0.52–1.04)
ERYTHROCYTE [DISTWIDTH] IN BLOOD BY AUTOMATED COUNT: 13.3 % (ref 10–15)
GFR SERPL CREATININE-BSD FRML MDRD: >90 ML/MIN/{1.73_M2}
GLUCOSE SERPL-MCNC: 87 MG/DL (ref 70–99)
HCT VFR BLD AUTO: 37.5 % (ref 35–47)
HDLC SERPL-MCNC: 41 MG/DL
HGB BLD-MCNC: 12.7 G/DL (ref 11.7–15.7)
LDLC SERPL CALC-MCNC: 94 MG/DL
MCH RBC QN AUTO: 28.7 PG (ref 26.5–33)
MCHC RBC AUTO-ENTMCNC: 33.9 G/DL (ref 31.5–36.5)
MCV RBC AUTO: 85 FL (ref 78–100)
NONHDLC SERPL-MCNC: 107 MG/DL
PLATELET # BLD AUTO: 167 10E9/L (ref 150–450)
POTASSIUM SERPL-SCNC: 3.9 MMOL/L (ref 3.4–5.3)
PROT SERPL-MCNC: 7.4 G/DL (ref 6.8–8.8)
RBC # BLD AUTO: 4.42 10E12/L (ref 3.8–5.2)
SODIUM SERPL-SCNC: 138 MMOL/L (ref 133–144)
TRIGL SERPL-MCNC: 66 MG/DL
WBC # BLD AUTO: 6.3 10E9/L (ref 4–11)

## 2020-12-21 PROCEDURE — 80061 LIPID PANEL: CPT | Performed by: FAMILY MEDICINE

## 2020-12-21 PROCEDURE — 36415 COLL VENOUS BLD VENIPUNCTURE: CPT | Performed by: FAMILY MEDICINE

## 2020-12-21 PROCEDURE — 85027 COMPLETE CBC AUTOMATED: CPT | Performed by: FAMILY MEDICINE

## 2020-12-21 PROCEDURE — 80053 COMPREHEN METABOLIC PANEL: CPT | Performed by: FAMILY MEDICINE

## 2021-01-03 ENCOUNTER — HEALTH MAINTENANCE LETTER (OUTPATIENT)
Age: 36
End: 2021-01-03

## 2021-02-04 ENCOUNTER — MEDICAL CORRESPONDENCE (OUTPATIENT)
Dept: HEALTH INFORMATION MANAGEMENT | Facility: CLINIC | Age: 36
End: 2021-02-04

## 2021-02-04 DIAGNOSIS — E03.9 HYPOTHYROIDISM: Primary | ICD-10-CM

## 2021-02-17 DIAGNOSIS — E03.9 HYPOTHYROIDISM: ICD-10-CM

## 2021-02-17 LAB — T3FREE SERPL-MCNC: 1.7 PG/ML (ref 2.3–4.2)

## 2021-02-17 PROCEDURE — 84439 ASSAY OF FREE THYROXINE: CPT | Performed by: INTERNAL MEDICINE

## 2021-02-17 PROCEDURE — 36415 COLL VENOUS BLD VENIPUNCTURE: CPT | Performed by: INTERNAL MEDICINE

## 2021-02-17 PROCEDURE — 84443 ASSAY THYROID STIM HORMONE: CPT | Performed by: INTERNAL MEDICINE

## 2021-02-17 PROCEDURE — 84481 FREE ASSAY (FT-3): CPT | Performed by: INTERNAL MEDICINE

## 2021-02-18 LAB
T4 FREE SERPL-MCNC: 0.99 NG/DL (ref 0.76–1.46)
TSH SERPL DL<=0.005 MIU/L-ACNC: 1 MU/L (ref 0.4–4)

## 2021-04-19 ENCOUNTER — OFFICE VISIT (OUTPATIENT)
Dept: FAMILY MEDICINE | Facility: CLINIC | Age: 36
End: 2021-04-19
Payer: COMMERCIAL

## 2021-04-19 DIAGNOSIS — Z71.84 TRAVEL ADVICE ENCOUNTER: Primary | ICD-10-CM

## 2021-04-19 PROCEDURE — 90471 IMMUNIZATION ADMIN: CPT | Mod: GA | Performed by: NURSE PRACTITIONER

## 2021-04-19 PROCEDURE — 99401 PREV MED CNSL INDIV APPRX 15: CPT | Mod: 25 | Performed by: NURSE PRACTITIONER

## 2021-04-19 PROCEDURE — 90691 TYPHOID VACCINE IM: CPT | Mod: GA | Performed by: NURSE PRACTITIONER

## 2021-04-19 RX ORDER — AZITHROMYCIN 500 MG/1
500 TABLET, FILM COATED ORAL DAILY
Qty: 3 TABLET | Refills: 0 | Status: SHIPPED | OUTPATIENT
Start: 2021-04-19 | End: 2021-04-20

## 2021-04-19 NOTE — PROGRESS NOTES
Nurse Note      Itinerary:  [facility]      Departure Date: 04/24/2021      Return Date: 05/16/2021      Length of Trip 3 weeks      Reason for Travel: Visiting friends and relatives           Urban or rural: urban      Accommodations: Family home        IMMUNIZATION HISTORY  Have you received any immunizations within the past 4 weeks?  No  Have you ever fainted from having your blood drawn or from an injection?  No  Have you ever had a fever reaction to vaccination?  No  Have you ever had any bad reaction or side effect from any vaccination?  No  Have you ever had hepatitis A or B vaccine?  No  Do you live (or work closely) with anyone who has AIDS, an AIDS-like condition, any other immune disorder or who is on chemotherapy for cancer?  No  Do you have a family history of immunodeficiency?  No  Have you received any injection of immune globulin or any blood products during the past 12 months?  No    Patient roomed by Gabriele Neely and DEVIN Garcia  Rocio Elkins is a 35 year old female seen today with child for counsultation for international travel.   Patient will be departing in  5 day(s) and  traveling with child(wilma).      Patient itinerary :  will be in the urban region of [facility] which risk for Malaria, Yellow Fever, food borne illnesses and Typhoid. exposure.      Patient's activities will include visiting friends and relatives.    Patient's country of birth is [facility],     Special medical concerns: none  Pre-travel questionnaire was completed by patient and reviewed by provider.     Vitals: There were no vitals taken for this visit.  BMI= There is no height or weight on file to calculate BMI.    EXAM:  General:  Well-nourished, well-developed in no acute distress.  Appears to be stated age, interacts appropriately and expresses understanding of information given to patient.    Current Outpatient Medications   Medication Sig Dispense Refill     azithromycin (ZITHROMAX) 500 MG  tablet Take 1 tablet (500 mg) by mouth daily for 3 doses Take 1 tablet a day for up to 3 days for severe diarrhea 3 tablet 0     cholecalciferol (VITAMIN D3) 1000 UNIT tablet Take 1,000 Units by mouth       levothyroxine (SYNTHROID/LEVOTHROID) 137 MCG tablet        omeprazole (PRILOSEC) 20 MG DR capsule TAKE 1 CAPSULE(20 MG) BY MOUTH DAILY (Patient not taking: Reported on 11/20/2020) 90 capsule 0     Patient Active Problem List   Diagnosis     LTBI (latent tuberculosis infection)     Postablative hypothyroidism     Thyrotoxic exophthalmos     Hypovitaminosis D     History of diet controlled gestational diabetes mellitus (GDM)     Lactating mother     History of third degree perineal laceration     History of postpartum hemorrhage     Allergies   Allergen Reactions     Pseudoephedrine Other (See Comments)     Dizziness, leg weakness  Other reaction(s): Other, see comments  Dizziness, leg weakness         Immunizations discussed include:   Hepatitis A:  Up to date  Hepatitis B: Up to date  Influenza: Up to date  Typhoid: Ordered/given today, risks, benefits and side effects reviewed  Rabies: Declined  reviewed managment of a animal bite or scratch (washing wound, seek medical care within 24 hours for post exposure prophylaxis ) and Insufficient time to vaccinate  Yellow Fever: Up to date  Japanese Encephalitis: Not indicated  Meningococcus: Up to date  Tetanus/Diphtheria: Up to date  Measles/Mumps/Rubella: Up to date  Cholera: Not needed  Polio: Up to date  Pneumococcal: Under age of 65  Varicella: Immune by disease history per patient report  Zostavax:  Not indicated  Shingrix: Not indicated  HPV:  Not indicated  TB:  Low risk , screening for work      ASSESSMENT/PLAN:  Rocio was seen today for travel clinic.    Diagnoses and all orders for this visit:    Travel advice encounter  -     azithromycin (ZITHROMAX) 500 MG tablet; Take 1 tablet (500 mg) by mouth daily for 3 doses Take 1 tablet a day for up to 3 days for  severe diarrhea    Other orders  -     TYPHOID VACCINE, IM      I have reviewed general recommendations for safe travel   including: food/water precautions, insect precautions, safer sex   practices given high prevalence of Zika, HIV and other STDs,   roadway safety. Educational materials and Travax report provided.    Malaraia prophylaxis recommended: Malarone  Symptomatic treatment for traveler's diarrhea: azithromycin        Evacuation insurance advised and resources were provided to patient.    Total visit time 15 minutes  with over 50% of time spent counseling patient as detailed above.    Rachel Martinez CNP                                  +

## 2021-04-19 NOTE — PATIENT INSTRUCTIONS
Thank you for visiting the [facility]     Today April 19, 2021 you received the    Typhoid - injectable. This vaccine is valid for two years.       Follow up vaccine appointments can be made as a NURSE ONLY visit at the Travel Clinic, (BE PREPARED TO WAIT, ) or at designated [facility].    If you are receiving the Rabies vaccines series, it is important that you follow the exact schedule ordered.     Pre-travel     We recommend that you purchase Trip Evacuation Insurance prior to your departure.      Post-travel  contact your provider if you develop a fever, rash, cough, diarrhea or other symptoms.  Inform your healthcare provider when and where you traveled to.    Animal Exposure: Avoid all mammals even if they look healthy.  If there is a bite, scratch or even a lick, wash area immediately with soap and water for 15 minutes and seek medical care within 24 hours for evaluation of Rabies post exposure treatment.  Contact your Medical Evacuation Insurance.    COVID 19 (Sars Cov2) prevention strategies  Physical distancing: Maintain 6 foot (2m) from others.              Avoid large gatherings and public transportation.   Avoid indoor shopping malls, theaters and restaurants   Practice consistent mask wearing covering the nose, mouth and underneath the chin when unable to maintain 6 foot distance from others.  Hand washing: frequent, thorough handwashing with soap and water for 20 seconds (or using a hand  containing 60% alcohol)   Avoid touching face, nose, eyes, mouth unless you have done appropriate hand washing as above.   Clean high touch surfaces with approved disinfectant against Covid 19  (70% Ethanol ) or a bleach solution (add 20 mL (4 teaspoons) of bleach to 1 L (1 quart) of water;)  Be careful not to breath or touch bleach.      Travel Covid 19 Testing:  updated 12/01/2020  CDC recommends the following strategies for travelers who remain  asymptomatic:   International travel: diagnostic testing (antigen or PCR) 1 to 3 days before departing the [place] 1 to 3 days before returning to the [place] and again 3 to 5 days after higher-risk international travel. Upon return from international travel, the traveler should remain at home for 10 days (7 days if the posttravel test result is negative)    COVID-19 testing for pre-travel through [facility]  [phone] (Must have an order)    Please call the [facility] with any questions [phone]  Or send your provider a 'My Chart' note.

## 2021-04-19 NOTE — PROGRESS NOTES
Nurse Note      Itinerary:  [facility]      Departure Date: 4/24/21      Return Date: 5/7/21      Length of Trip 3 weeks      Reason for Travel: Tourism           Urban or rural: both      Accommodations: Family home        IMMUNIZATION HISTORY  Have you received any immunizations within the past 4 weeks?  No  Have you ever fainted from having your blood drawn or from an injection?  No  Have you ever had a fever reaction to vaccination?  No  Have you ever had any bad reaction or side effect from any vaccination?  No  Have you ever had hepatitis A or B vaccine?  yes  Do you live (or work closely) with anyone who has AIDS, an AIDS-like condition, any other immune disorder or who is on chemotherapy for cancer?  No  Do you have a family history of immunodeficiency?  No  Have you received any injection of immune globulin or any blood products during the past 12 months?  No    Patient roomed by Gabriele Pradoza MAU Elkins is a 35 year old female seen today with child for counsultation for international travel.   Patient will be departing in  5 daysand  traveling with family member(s).      Patient itinerary :  will be in the urban region of [facility] which risk for Malaria, Yellow Fever, motor vehicle accidents and Typhoid. exposure.      Patient's activities will include visiting friends and relatives.    Patient's country of birth is [facility],     Special medical concerns: none  Pre-travel questionnaire was completed by patient and reviewed by provider.     Vitals: There were no vitals taken for this visit.  BMI= There is no height or weight on file to calculate BMI.    EXAM:  General:  Well-nourished, well-developed in no acute distress.  Appears to be stated age, interacts appropriately and expresses understanding of information given to patient.    Current Outpatient Medications   Medication Sig Dispense Refill     azithromycin (ZITHROMAX) 500 MG tablet Take 1 tablet (500 mg) by mouth  daily for 3 doses Take 1 tablet a day for up to 3 days for severe diarrhea 3 tablet 0     cholecalciferol (VITAMIN D3) 1000 UNIT tablet Take 1,000 Units by mouth       levothyroxine (SYNTHROID/LEVOTHROID) 137 MCG tablet        omeprazole (PRILOSEC) 20 MG DR capsule TAKE 1 CAPSULE(20 MG) BY MOUTH DAILY (Patient not taking: Reported on 11/20/2020) 90 capsule 0     Patient Active Problem List   Diagnosis     LTBI (latent tuberculosis infection)     Postablative hypothyroidism     Thyrotoxic exophthalmos     Hypovitaminosis D     History of diet controlled gestational diabetes mellitus (GDM)     Lactating mother     History of third degree perineal laceration     History of postpartum hemorrhage     Allergies   Allergen Reactions     Pseudoephedrine Other (See Comments)     Dizziness, leg weakness  Other reaction(s): Other, see comments  Dizziness, leg weakness         Immunizations discussed include:   Hepatitis A:  Up to date  Hepatitis B: Up to date  Influenza: Up to date  Typhoid: Ordered/given today, risks, benefits and side effects reviewed  Rabies: Declined  reviewed managment of a animal bite or scratch (washing wound, seek medical care within 24 hours for post exposure prophylaxis )  Yellow Fever: Up to date  Japanese Encephalitis: Not indicated  Meningococcus: Up to date  Tetanus/Diphtheria: Up to date  Measles/Mumps/Rubella: immune  Cholera: Not needed  Polio: Up to date  Pneumococcal: Under age of 65  Varicella: Immune by disease history per patient report  Zostavax:  Not indicated  Shingrix: Not indicated  HPV:  Not indicated  TB:  screening for work    ASSESSMENT/PLAN:  Rocio was seen today for travel clinic.    Diagnoses and all orders for this visit:    Travel advice encounter  -     azithromycin (ZITHROMAX) 500 MG tablet; Take 1 tablet (500 mg) by mouth daily for 3 doses Take 1 tablet a day for up to 3 days for severe diarrhea    Other orders  -     TYPHOID VACCINE, IM      I have reviewed general  recommendations for safe travel   including: food/water precautions, insect precautions, safer sex   practices given high prevalence of Zika, HIV and other STDs,   roadway safety. Educational materials and Travax report provided.    Malaraia prophylaxis recommended: none  Symptomatic treatment for traveler's diarrhea: azithromycin      Personal protective measures reviewed including hand sanitizing and contact precautions for the prevention of viral illnesses. Cover coughs and masking  during travel and upon return.  Current COVID 19 pandemic.   Monitor / follow current CDC guidelines.      Evacuation insurance advised and resources were provided to patient.    Total visit time 15 minutes  with over 50% of time spent counseling patient as detailed above.    Rachel Martinez CNP

## 2021-04-20 ENCOUNTER — OFFICE VISIT (OUTPATIENT)
Dept: FAMILY MEDICINE | Facility: CLINIC | Age: 36
End: 2021-04-20
Payer: COMMERCIAL

## 2021-04-20 VITALS
WEIGHT: 142 LBS | DIASTOLIC BLOOD PRESSURE: 60 MMHG | HEART RATE: 65 BPM | OXYGEN SATURATION: 100 % | BODY MASS INDEX: 22.24 KG/M2 | TEMPERATURE: 98.5 F | SYSTOLIC BLOOD PRESSURE: 94 MMHG

## 2021-04-20 DIAGNOSIS — T17.310A CHOKING DUE TO PHLEGM IN LARYNX, INITIAL ENCOUNTER: Primary | ICD-10-CM

## 2021-04-20 PROCEDURE — 99213 OFFICE O/P EST LOW 20 MIN: CPT | Performed by: FAMILY MEDICINE

## 2021-04-20 NOTE — PROGRESS NOTES
Assessment & Plan     Choking due to phlegm in larynx, initial encounter  Questionable etiology.  Recommending to try over-the-counter Mucinex and stay well-hydrated.  She has no signs of environmental allergies or reflux.  No thyromegaly noted on exam.  Recommending to see gastroenterology.  Patient is leaving for [facility] in a few days for 3 weeks trip.  If she is unable to be seen before her trip she needs to schedule an appointment right after she returns.  Patient agrees to the plan.  - GASTROENTEROLOGY ADULT REF CONSULT ONLY; Future      Kezia Cali MD  [facility] LEOPOLDO Jennings is a 35 year old who presents for the following health issues     HPI     Concern - Throat Problem  Onset: 1-2 years off and on  Description: feels like she is choking on saliva - feels like it is going down the wrong tube.No difficulty swallowing food. Does wake from sleep with this as well.  Intensity: moderate  Progression of Symptoms:  worsening          Review of Systems   CONSTITUTIONAL: NEGATIVE for fever, chills, change in weight  ENT/MOUTH: NEGATIVE for ear, mouth and throat problems  RESP: NEGATIVE for significant cough or SOB  CV: NEGATIVE for chest pain, palpitations or peripheral edema      Objective    BP 94/60   Pulse 65   Temp 98.5  F (36.9  C) (Tympanic)   Wt 64.4 kg (142 lb)   LMP 03/29/2021   SpO2 100%   BMI 22.24 kg/m    Body mass index is 22.24 kg/m .  Physical Exam   GENERAL: healthy, alert and no distress  HENT: ear canals and TM's normal, nose and mouth without ulcers or lesions  NECK: no adenopathy, no asymmetry, masses, or scars and thyroid normal to palpation  RESP: lungs clear to auscultation - no rales, rhonchi or wheezes  CV: regular rate and rhythm, normal S1 S2, no S3 or S4, no murmur, click or rub, no peripheral edema and peripheral pulses strong  ABDOMEN: soft, nontender, no hepatosplenomegaly, no masses and bowel sounds normal

## 2021-04-23 ENCOUNTER — TRANSFERRED RECORDS (OUTPATIENT)
Dept: HEALTH INFORMATION MANAGEMENT | Facility: CLINIC | Age: 36
End: 2021-04-23

## 2021-07-16 ENCOUNTER — OFFICE VISIT (OUTPATIENT)
Dept: FAMILY MEDICINE | Facility: CLINIC | Age: 36
End: 2021-07-16
Payer: COMMERCIAL

## 2021-07-16 VITALS
DIASTOLIC BLOOD PRESSURE: 60 MMHG | OXYGEN SATURATION: 98 % | TEMPERATURE: 98.4 F | HEART RATE: 87 BPM | SYSTOLIC BLOOD PRESSURE: 104 MMHG

## 2021-07-16 DIAGNOSIS — J02.0 STREP THROAT: Primary | ICD-10-CM

## 2021-07-16 LAB — DEPRECATED S PYO AG THROAT QL EIA: POSITIVE

## 2021-07-16 PROCEDURE — 99213 OFFICE O/P EST LOW 20 MIN: CPT | Performed by: PHYSICIAN ASSISTANT

## 2021-07-16 RX ORDER — PENICILLIN V POTASSIUM 500 MG/1
500 TABLET, FILM COATED ORAL 2 TIMES DAILY
Qty: 20 TABLET | Refills: 0 | Status: SHIPPED | OUTPATIENT
Start: 2021-07-16 | End: 2021-07-26

## 2021-07-16 NOTE — PROGRESS NOTES
Assessment & Plan       ICD-10-CM    1. Strep throat  J02.0 Streptococcus A Rapid Scr w Reflx to PCR - Lab Collect     penicillin V (VEETID) 500 MG tablet     - RST positive, take entire course of antibx as prescribed, even if sx improve.  - Get plenty of rest and fluids. Cover your cough and wash your hands.  - Replace toothbrush on final day of antibx to prevent re-infection.  - OTC pain reliever prn fever, pain.  - Salt water gargles prn sore throat.    Return in about 3 days (around 7/19/2021), or if symptoms worsen or fail to improve.    [facility] PRAIR[facility] Jennings is a 35 year old who presents for the following health issues     HPI     Acute Illness  Acute illness concerns: Headache and sore throat  Onset/Duration: 2 days  Symptoms:  Fever: no  Chills/Sweats: YES  Headache (location?): YES frontal, and behind right eye   Sinus Pressure: slight pressure under eyes  Conjunctivitis:  no  Ear Pain: no  Rhinorrhea: no  Congestion: no  Sore Throat: YES, hurts to swallow and tender to touch  Cough: no  Wheeze: no  Decreased Appetite: no  Nausea: no  Vomiting: no  Diarrhea: no  Dysuria/Freq.: no  Dysuria or Hematuria: no  Fatigue/Achiness: no  Sick/Strep Exposure: no  Therapies tried and outcome: Tyl        Review of Systems   Constitutional, HEENT, cardiovascular, pulmonary, GI, , musculoskeletal, neuro, skin, endocrine and psych systems are negative, except as otherwise noted.      Objective    /60   Pulse 87   Temp 98.4  F (36.9  C) (Tympanic)   LMP 07/09/2021 (Exact Date)   SpO2 98%   There is no height or weight on file to calculate BMI.  Physical Exam   GENERAL:  WDWN, no acute distress  PSYCH: pleasant, cooperative  EYES: no discharge, no injection  HENT:  Normocephalic. Moist mucus membranes. Ear canals clear, TMs pearly gray w/o effusion. Oropharynx mildly erythematous without tonsillar hypertrophy or exudates, uvula midline.  NECK:   Supple, symmetric, ant cerv BRITTANI 2+ bilat  LUNGS:  Clear to auscultation bilaterally without rhonchi, rales, or wheeze. Chest rise symmetric and no tenderness to palpation.  HEART:  Regular rate & rhythm. No murmur, gallop, or rub.  EXTREMITIES:  No gross deformities, moves all 4 limbs spontaneously, no peripheral edema  SKIN:  Warm and dry, no rash or suspicious lesions    NEUROLOGIC: alert, sensation grossly intact.    Results for orders placed or performed in visit on 07/16/21   Streptococcus A Rapid Scr w Reflx to PCR - Lab Collect     Status: Abnormal    Specimen: Throat; Swab   Result Value Ref Range    Group A Strep antigen Positive (A) Negative

## 2021-08-23 ENCOUNTER — ALLIED HEALTH/NURSE VISIT (OUTPATIENT)
Dept: FAMILY MEDICINE | Facility: CLINIC | Age: 36
End: 2021-08-23
Payer: COMMERCIAL

## 2021-08-23 ENCOUNTER — MEDICAL CORRESPONDENCE (OUTPATIENT)
Dept: HEALTH INFORMATION MANAGEMENT | Facility: CLINIC | Age: 36
End: 2021-08-23

## 2021-08-23 DIAGNOSIS — J02.0 STREPTOCOCCAL SORE THROAT: Primary | ICD-10-CM

## 2021-08-23 LAB — DEPRECATED S PYO AG THROAT QL EIA: NEGATIVE

## 2021-08-23 PROCEDURE — 87651 STREP A DNA AMP PROBE: CPT

## 2021-08-24 LAB — GROUP A STREP BY PCR: NOT DETECTED

## 2021-08-31 DIAGNOSIS — E89.0 HYPOTHYROIDISM FOLLOWING RADIOIODINE THERAPY: Primary | ICD-10-CM

## 2021-09-02 ENCOUNTER — OFFICE VISIT (OUTPATIENT)
Dept: FAMILY MEDICINE | Facility: CLINIC | Age: 36
End: 2021-09-02
Payer: COMMERCIAL

## 2021-09-02 VITALS
TEMPERATURE: 98.4 F | BODY MASS INDEX: 22.29 KG/M2 | WEIGHT: 142 LBS | OXYGEN SATURATION: 100 % | DIASTOLIC BLOOD PRESSURE: 80 MMHG | HEART RATE: 77 BPM | RESPIRATION RATE: 10 BRPM | SYSTOLIC BLOOD PRESSURE: 110 MMHG | HEIGHT: 67 IN

## 2021-09-02 DIAGNOSIS — J01.90 ACUTE SINUSITIS WITH SYMPTOMS > 10 DAYS: Primary | ICD-10-CM

## 2021-09-02 PROCEDURE — 99213 OFFICE O/P EST LOW 20 MIN: CPT | Performed by: FAMILY MEDICINE

## 2021-09-02 ASSESSMENT — MIFFLIN-ST. JEOR: SCORE: 1361.74

## 2021-09-02 NOTE — PROGRESS NOTES
"    Assessment & Plan     Acute sinusitis with symptoms > 10 days  Worsening with sinus tenderness and sore throat, has no cough nor SOB, has no Fever, will have her to try abx  - amoxicillin-clavulanate (AUGMENTIN) 875-125 MG tablet; Take 1 tablet by mouth 2 times daily             FUTURE APPOINTMENTS:       - Follow-up visit in 3 months for consider referral to infertility or further work up    No follow-ups on file.    Viet Benavidez MD  [facility] LEOPOLDO Jennings is a 35 year old who presents for the following health issues     HPI     Patient is here regarding continued ear pain and sore throat  - left ear pain, no fullness but a humming like sensation  - sore throat with a feeling of cough deep in throat  - no fevers, no congestion        Review of Systems   Constitutional, HEENT, cardiovascular, pulmonary, gi and gu systems are negative, except as otherwise noted.      Objective    /80   Pulse 77   Temp 98.4  F (36.9  C) (Tympanic)   Resp 10   Ht 1.702 m (5' 7\")   Wt 64.4 kg (142 lb)   SpO2 100%   BMI 22.24 kg/m    Body mass index is 22.24 kg/m .  Physical Exam   GENERAL: healthy, alert and no distress  NECK: no adenopathy, no asymmetry, masses, or scars and thyroid normal to palpation  RESP: lungs clear to auscultation - no rales, rhonchi or wheezes  CV: regular rate and rhythm, normal S1 S2, no S3 or S4, no murmur, click or rub, no peripheral edema and peripheral pulses strong  ABDOMEN: soft, nontender, no hepatosplenomegaly, no masses and bowel sounds normal  MS: no gross musculoskeletal defects noted, no edema                "

## 2021-09-22 ENCOUNTER — LAB (OUTPATIENT)
Dept: LAB | Facility: CLINIC | Age: 36
End: 2021-09-22
Payer: COMMERCIAL

## 2021-09-22 DIAGNOSIS — E89.0 HYPOTHYROIDISM FOLLOWING RADIOIODINE THERAPY: ICD-10-CM

## 2021-09-22 LAB — T3FREE SERPL-MCNC: 2 PG/ML (ref 2.3–4.2)

## 2021-09-22 PROCEDURE — 84439 ASSAY OF FREE THYROXINE: CPT

## 2021-09-22 PROCEDURE — 84481 FREE ASSAY (FT-3): CPT

## 2021-09-22 PROCEDURE — 36415 COLL VENOUS BLD VENIPUNCTURE: CPT

## 2021-09-22 PROCEDURE — 84443 ASSAY THYROID STIM HORMONE: CPT

## 2021-09-23 LAB
T4 FREE SERPL-MCNC: 1.33 NG/DL (ref 0.76–1.46)
TSH SERPL DL<=0.005 MIU/L-ACNC: 0.85 MU/L (ref 0.4–4)

## 2021-09-29 NOTE — PROGRESS NOTES
Called to remind pt of appt for 9- with Dr Roxi Tee. He cancelled and will reschedule when he gets medical ins since he lost his job. Feels well overall.   Fetal Movement: positive, denies loss of fluid/vb, no contractions  Fetal kick counts/movement reviewed  Reviewed PTL precautions and s/sx of preeclampsia; denies any S&S and aware of what to report     Peds chosen: No -  Thinking [facility]    Plans to breastfeed Yes  Breastfeeding class planned: She doesn't think so. Information given.    GDM diet controlled: In contact with diabetes education weekly. Sends them logs. Sent her log in yesterday - not in computer yet.     TSH/T4 and CBC to be done at 36 weeks for hypothyroidism and thrombocytopenia.  Last ultrasound showed low lying placenta resolved. [facility] recommended growth ultrasound at 36 weeks.     Return to clinic 2 weeks    Jevon Montero, MADELINE, APRN, CNM

## 2021-10-10 ENCOUNTER — HEALTH MAINTENANCE LETTER (OUTPATIENT)
Age: 36
End: 2021-10-10

## 2021-10-12 ENCOUNTER — OFFICE VISIT (OUTPATIENT)
Dept: FAMILY MEDICINE | Facility: CLINIC | Age: 36
End: 2021-10-12
Payer: COMMERCIAL

## 2021-10-12 VITALS
HEART RATE: 82 BPM | OXYGEN SATURATION: 100 % | SYSTOLIC BLOOD PRESSURE: 112 MMHG | DIASTOLIC BLOOD PRESSURE: 82 MMHG | TEMPERATURE: 97.8 F | WEIGHT: 142.6 LBS | BODY MASS INDEX: 22.33 KG/M2

## 2021-10-12 DIAGNOSIS — E55.9 VITAMIN D DEFICIENCY: ICD-10-CM

## 2021-10-12 DIAGNOSIS — J01.90 ACUTE SINUSITIS WITH SYMPTOMS > 10 DAYS: Primary | ICD-10-CM

## 2021-10-12 PROCEDURE — 82306 VITAMIN D 25 HYDROXY: CPT | Performed by: FAMILY MEDICINE

## 2021-10-12 PROCEDURE — 99213 OFFICE O/P EST LOW 20 MIN: CPT | Performed by: FAMILY MEDICINE

## 2021-10-12 PROCEDURE — 36415 COLL VENOUS BLD VENIPUNCTURE: CPT | Performed by: FAMILY MEDICINE

## 2021-10-12 RX ORDER — CEFPROZIL 500 MG/1
500 TABLET, FILM COATED ORAL 2 TIMES DAILY
Qty: 20 TABLET | Refills: 0 | Status: SHIPPED | OUTPATIENT
Start: 2021-10-12 | End: 2021-12-31

## 2021-10-12 ASSESSMENT — PAIN SCALES - GENERAL: PAINLEVEL: NO PAIN (0)

## 2021-10-12 NOTE — PROGRESS NOTES
Assessment & Plan     Acute sinusitis with symptoms > 10 days  Worsening again, will have her to resume abx   - cefPROZIL (CEFZIL) 500 MG tablet; Take 1 tablet (500 mg) by mouth 2 times daily    Vitamin D deficiency  Has been fluctuating, will check for f/u   - Vitamin D Deficiency; Future             FUTURE APPOINTMENTS:       - Follow-up visit in 2 weeks if not improving     No follow-ups on file.    Viet Benavidez MD  [facility] LEOPOLDO Jennings is a 35 year old who presents for the following health issues     HPI     Acute Illness  Acute illness concerns: sinus  Onset/Duration: 3days  Symptoms: neck pain  Fever: no  Chills/Sweats: no  Headache (location?): YES  Sinus Pressure: YES  Conjunctivitis:  no  Ear Pain: no  Rhinorrhea: no  Congestion: YES  Sore Throat: not anymore about 3 days ago  Cough: no  Wheeze: no  Decreased Appetite: no  Nausea: no  Vomiting: no  Diarrhea: no  Dysuria/Freq.: no  Dysuria or Hematuria: no  Fatigue/Achiness: no  Sick/Strep Exposure: no  Therapies tried and outcome: None      Review of Systems   Constitutional, HEENT, cardiovascular, pulmonary, gi and gu systems are negative, except as otherwise noted.      Objective    /82 (Cuff Size: Adult Regular)   Pulse 82   Temp 97.8  F (36.6  C) (Tympanic)   Wt 64.7 kg (142 lb 9.6 oz)   SpO2 100%   BMI 22.33 kg/m    Body mass index is 22.33 kg/m .  Physical Exam   GENERAL: healthy, alert and no distress  NECK: no adenopathy, no asymmetry, masses, or scars and thyroid normal to palpation  RESP: lungs clear to auscultation - no rales, rhonchi or wheezes  CV: regular rate and rhythm, normal S1 S2, no S3 or S4, no murmur, click or rub, no peripheral edema and peripheral pulses strong  ABDOMEN: soft, nontender, no hepatosplenomegaly, no masses and bowel sounds normal  MS: no gross musculoskeletal defects noted, no edema

## 2021-10-13 LAB — DEPRECATED CALCIDIOL+CALCIFEROL SERPL-MC: 24 UG/L (ref 20–75)

## 2021-10-22 ENCOUNTER — ALLIED HEALTH/NURSE VISIT (OUTPATIENT)
Dept: FAMILY MEDICINE | Facility: CLINIC | Age: 36
End: 2021-10-22
Payer: COMMERCIAL

## 2021-10-22 DIAGNOSIS — Z23 NEED FOR PROPHYLACTIC VACCINATION AND INOCULATION AGAINST INFLUENZA: Primary | ICD-10-CM

## 2021-10-22 PROCEDURE — 99207 PR NO CHARGE NURSE ONLY: CPT

## 2021-10-22 PROCEDURE — 90471 IMMUNIZATION ADMIN: CPT

## 2021-10-22 PROCEDURE — 90686 IIV4 VACC NO PRSV 0.5 ML IM: CPT

## 2021-11-01 ENCOUNTER — OFFICE VISIT (OUTPATIENT)
Dept: FAMILY MEDICINE | Facility: CLINIC | Age: 36
End: 2021-11-01
Payer: COMMERCIAL

## 2021-11-01 ENCOUNTER — NURSE TRIAGE (OUTPATIENT)
Dept: FAMILY MEDICINE | Facility: CLINIC | Age: 36
End: 2021-11-01

## 2021-11-01 VITALS
HEART RATE: 72 BPM | DIASTOLIC BLOOD PRESSURE: 80 MMHG | RESPIRATION RATE: 12 BRPM | HEIGHT: 67 IN | BODY MASS INDEX: 22.91 KG/M2 | WEIGHT: 146 LBS | SYSTOLIC BLOOD PRESSURE: 110 MMHG | OXYGEN SATURATION: 100 % | TEMPERATURE: 98.1 F

## 2021-11-01 DIAGNOSIS — S05.02XA ABRASION OF LEFT CORNEA, INITIAL ENCOUNTER: Primary | ICD-10-CM

## 2021-11-01 PROCEDURE — 99213 OFFICE O/P EST LOW 20 MIN: CPT | Performed by: PHYSICIAN ASSISTANT

## 2021-11-01 RX ORDER — LEVOTHYROXINE SODIUM 88 UG/1
88 TABLET ORAL DAILY
COMMUNITY
Start: 2021-09-29 | End: 2021-12-21

## 2021-11-01 RX ORDER — ERYTHROMYCIN 5 MG/G
0.5 OINTMENT OPHTHALMIC 3 TIMES DAILY
Qty: 7.5 G | Refills: 0 | Status: SHIPPED | OUTPATIENT
Start: 2021-11-01 | End: 2021-11-06

## 2021-11-01 ASSESSMENT — MIFFLIN-ST. JEOR: SCORE: 1379.88

## 2021-11-01 ASSESSMENT — PAIN SCALES - GENERAL: PAINLEVEL: NO PAIN (0)

## 2021-11-01 NOTE — PROGRESS NOTES
"  Assessment & Plan       ICD-10-CM    1. Abrasion of left cornea, initial encounter  S05.02XA erythromycin (ROMYCIN) 5 MG/GM ophthalmic ointment     Traumatic corneal abrasion in non-contact lens wearer. Apply erythromycin ointment as directed to prevent infection and provide lubrication. Continue use of NSAIDs prn pain.    Return in about 1 week (around 11/8/2021), or if symptoms worsen or fail to improve.    SHARAN [facility] LEOPOLDO Jennings is a 35 year old who presents for the following health issues     HPI     Per Nurse Triage:  Reporting she was playing with daughter yesterday when she poked her left eye. Pt having frequent tearing. Redness of the eye noted. Slight swelling of eyelid. Patient reports pain as 8-9/10.     Took some advil this morning and feels better.       Review of Systems   Constitutional, HEENT, cardiovascular, pulmonary, GI, , musculoskeletal, neuro, skin, endocrine and psych systems are negative, except as otherwise noted.      Objective    /80   Pulse 72   Temp 98.1  F (36.7  C) (Tympanic)   Resp 12   Ht 1.702 m (5' 7\")   Wt 66.2 kg (146 lb)   SpO2 100%   BMI 22.87 kg/m    Body mass index is 22.87 kg/m .  Physical Exam   GENERAL:  WDWN, no acute distress  PSYCH: pleasant, cooperative  EYES: watery discharge, Lt conjunctiva injected on lateral side, PERRL, EOMI. Mild swelling of Lt eyelids.  HENT:  Normocephalic. Moist mucus membranes.  NECK:  Supple, symmetric  EXTREMITIES:  No gross deformities, moves all 4 limbs spontaneously, no peripheral edema  SKIN:  Warm and dry, no rash or suspicious lesions    NEUROLOGIC:  alert, sensation grossly intact.                "

## 2021-11-01 NOTE — TELEPHONE ENCOUNTER
"Patient employee at clinic. Reporting she was playing with daughter yesterday when she poked her left eye. Pt having frequent tearing. Redness of the eye noted. Slight swelling of eyelid. Patient reports pain as 8-9/10.     Scheduled with Rita August for evaluation. Advised to take advil to help with pain control until appointment.     Reason for Disposition    SEVERE pain (e.g., excruciating)    Additional Information    Negative: Knocked out (unconscious) > 1 minute    Negative: Sounds like a life-threatening emergency to the triager    Negative: Wound looks infected    Negative: Foreign body in the eye    Negative: Head injury is main concern    Negative: Vision is blurred or lost in either eye    Negative: Double vision or unable to look upwards    Negative: Foreign body (FB) hit eye at high speed (e.g., small metallic chip from hammering, lawnmower, BB gun, explosion)    Negative: Bloody or cloudy fluid behind the cornea (clear part)    Answer Assessment - Initial Assessment Questions  1. MECHANISM: \"How did the injury happen?\"         Playing with daughter, who poked her eye     2. ONSET: \"When did the injury happen?\" (Minutes or hours ago)         Last night     3. LOCATION: \"What part of the eye is injured?\" (cornea, sclera, eyelid, or periorbital tissue)        Left eye     4. APPEARANCE: \"What does the eye look like?\"         Red on the whites of the eyes  Difficulty opening eye  Swollen     5. VISION: \"Is the vision blurred?\"         No just painful and doesn't want to open   6. PAIN: \"Is it painful?\" If so, ask: \"How bad is the pain?\"   (e.g., Scale 1-10; or mild, moderate, severe)  8-9/10         7. SIZE: For cuts, bruises, or swelling, ask: \"How large is it?\" (e.g., inches or centimeters)         n/a  8. TETANUS: For any breaks in the skin, ask: \"When was the last tetanus booster?\"        Up to date    9. CONTACTS: \"Do you wear contacts?\"        No   10. OTHER SYMPTOMS: \"Do you have any other " "symptoms?\" (e.g., headache, neck pain, vomiting)          No headache  No vomiting  No neck pain   11. PREGNANCY: \"Is there any chance you are pregnant?\" \"When was your last menstrual period?\"         Not asked    Protocols used: EYE INJURY-A-OH      "

## 2021-11-09 ENCOUNTER — OFFICE VISIT (OUTPATIENT)
Dept: OBGYN | Facility: CLINIC | Age: 36
End: 2021-11-09
Payer: COMMERCIAL

## 2021-11-09 VITALS
SYSTOLIC BLOOD PRESSURE: 104 MMHG | BODY MASS INDEX: 22.44 KG/M2 | DIASTOLIC BLOOD PRESSURE: 66 MMHG | HEIGHT: 67 IN | WEIGHT: 143 LBS

## 2021-11-09 DIAGNOSIS — E02 SUBCLINICAL IODINE-DEFICIENCY HYPOTHYROIDISM: ICD-10-CM

## 2021-11-09 DIAGNOSIS — Z31.41 FERTILITY TESTING: Primary | ICD-10-CM

## 2021-11-09 LAB — HBA1C MFR BLD: 5.4 % (ref 0–5.6)

## 2021-11-09 PROCEDURE — 36415 COLL VENOUS BLD VENIPUNCTURE: CPT | Performed by: OBSTETRICS & GYNECOLOGY

## 2021-11-09 PROCEDURE — 99000 SPECIMEN HANDLING OFFICE-LAB: CPT | Performed by: OBSTETRICS & GYNECOLOGY

## 2021-11-09 PROCEDURE — 83036 HEMOGLOBIN GLYCOSYLATED A1C: CPT | Performed by: OBSTETRICS & GYNECOLOGY

## 2021-11-09 PROCEDURE — 84443 ASSAY THYROID STIM HORMONE: CPT | Performed by: OBSTETRICS & GYNECOLOGY

## 2021-11-09 PROCEDURE — 84146 ASSAY OF PROLACTIN: CPT | Performed by: OBSTETRICS & GYNECOLOGY

## 2021-11-09 PROCEDURE — 99213 OFFICE O/P EST LOW 20 MIN: CPT | Performed by: OBSTETRICS & GYNECOLOGY

## 2021-11-09 PROCEDURE — 83520 IMMUNOASSAY QUANT NOS NONAB: CPT | Mod: 90 | Performed by: OBSTETRICS & GYNECOLOGY

## 2021-11-09 ASSESSMENT — MIFFLIN-ST. JEOR: SCORE: 1366.27

## 2021-11-09 NOTE — PATIENT INSTRUCTIONS
Patient needs labs today  Call with her cycle and then do day 3 labs (3-5)  Schedule ultrasound  She can call Emma if she has a desire to do the HSG (days 6-10 of cycle)  [facility] referral sent over, they will call them

## 2021-11-09 NOTE — PROGRESS NOTES
SUBJECTIVE:                                                   Rocio Elkins is a 35 year old female who presents to clinic today for the following health issue(s):  Patient presents with:  Infertililty: patient is trying for another pregnancy for the past 6 months    HPI:  Patient has desire for conception.  She had a vaginal delivery 2019.  She reports that her cycles are regular.  Her cycles are regular, 26-28 day intervals.  She has bleeding for 3 days.  Her bleeding is normal flow.  She is on synthroid for hypothyroidism. No pain with intercourse or pain with cycles.    She didn't have any problems with last pregnancy conception.      Patient's last menstrual period was 10/21/2021..   Patient is sexually active, .  Using nothing for contraception.    reports that she has never smoked. She has never used smokeless tobacco.  Health maintenance updated:  yes    Today's PHQ-2 Score:   PHQ-2 (  [employer]) 2021   Q1: Little interest or pleasure in doing things 0   Q2: Feeling down, depressed or hopeless 0   PHQ-2 Score 0     Today's PHQ-9 Score:   PHQ-9 SCORE 2019   PHQ-9 Total Score 0     Today's JG-7 Score:   JG-7 SCORE 2019   Total Score 0       Problem list and histories reviewed & adjusted, as indicated.  Additional history: as documented.    Patient Active Problem List   Diagnosis     LTBI (latent tuberculosis infection)     Postablative hypothyroidism     Thyrotoxic exophthalmos     Hypovitaminosis D     History of diet controlled gestational diabetes mellitus (GDM)     Lactating mother     History of third degree perineal laceration     History of postpartum hemorrhage     Past Surgical History:   Procedure Laterality Date     NO HISTORY OF SURGERY        Social History     Tobacco Use     Smoking status: Never Smoker     Smokeless tobacco: Never Used   Substance Use Topics     Alcohol use: No           Current Outpatient Medications   Medication Sig     cefPROZIL (CEFZIL) 500 MG  "tablet Take 1 tablet (500 mg) by mouth 2 times daily     levothyroxine (SYNTHROID/LEVOTHROID) 88 MCG tablet Take 88 mcg by mouth daily     No current facility-administered medications for this visit.     Allergies   Allergen Reactions     Pseudoephedrine Other (See Comments)     Dizziness, leg weakness  Other reaction(s): Other, see comments  Dizziness, leg weakness       ROS:  12 point review of systems negative other than symptoms noted below or in the HPI.  No urinary frequency or dysuria, bladder or kidney problems      OBJECTIVE:     /66   Ht 1.702 m (5' 7\")   Wt 64.9 kg (143 lb)   LMP 10/21/2021   BMI 22.40 kg/m    Body mass index is 22.4 kg/m .    Exam:  Constitutional:  Appearance: Well nourished, well developed alert, in no acute distress     In-Clinic Test Results:  No results found for this or any previous visit (from the past 24 hour(s)).    ASSESSMENT/PLAN:                                                        ICD-10-CM    1. Fertility testing  Z31.41 Hemoglobin A1c     TSH with free T4 reflex     Prolactin     Follicle stimulating hormone     Estradiol     Lutropin     US Transvaginal Non OB       1. Start with some initial labs: AMH, to look at ovarian function, Discussed her thyroid has fluctuated, want to make sure stable.  2. Ovarian function labs timed with her cycle: FSH, LH, and estradiol  3. US to evaluate the anatomy  4. HSG for tubal patency, she is going to talk with her  and determine if she wants to have this done (days 6-10).    5. [facility] referral- will send the referral over today  6. Discussed possible cycles.  Discussed possible clomid or femara days 5-9 of her cycle.  The would have her do an ultrasound day 12-14 of her cycle to see how she responds.  At that time could do ovidrel or timed intercourse alone.      Megan Cleveland MD  [facility] [place]                          Please schedule IN SI    Patient Name:  Rocio Elkins " ([id number]).  :  1985      Requested Dates:  Days 6-10 cycle  Schedule based on:  cycle  Amount of time needed for the procedure:  30 mins   Amount of time prior to procedure patient to arrive: 30 mins  Expected time off from work:  2 hrs  Surgeon:  Megan Cleveland MD  [place]  [facility] [place] to performed:   [facility] Procedure Room  Anesthesia:  nan     Allergies   Allergen Reactions     Pseudoephedrine Other (See Comments)     Dizziness, leg weakness  Other reaction(s): Other, see comments  Dizziness, leg weakness           DIAGNOSIS:  Fertility testing    Special instructions:  No unprotected sex before the procedure, after period, between bleeding and procedure  Vendor Rep:  na  Meds Needed: None  RXs explained to patient: No  RXs given to patient: No  RXs to be mailed to patient: No

## 2021-11-10 LAB
PROLACTIN SERPL-MCNC: 24 UG/L (ref 3–27)
TSH SERPL DL<=0.005 MIU/L-ACNC: 3.33 MU/L (ref 0.4–4)

## 2021-11-11 RX ORDER — LEVOTHYROXINE SODIUM 100 UG/1
100 TABLET ORAL DAILY
Qty: 30 TABLET | Refills: 0 | Status: SHIPPED | OUTPATIENT
Start: 2021-11-11 | End: 2021-12-21

## 2021-11-14 LAB — MIS SERPL-MCNC: 0.61 NG/ML

## 2021-11-18 ENCOUNTER — LAB (OUTPATIENT)
Dept: LAB | Facility: CLINIC | Age: 36
End: 2021-11-18
Payer: COMMERCIAL

## 2021-11-18 DIAGNOSIS — Z11.59 NEED FOR HEPATITIS C SCREENING TEST: Primary | ICD-10-CM

## 2021-11-18 DIAGNOSIS — Z31.41 FERTILITY TESTING: ICD-10-CM

## 2021-11-18 DIAGNOSIS — E02 SUBCLINICAL IODINE-DEFICIENCY HYPOTHYROIDISM: ICD-10-CM

## 2021-11-18 LAB
ESTRADIOL SERPL-MCNC: 49 PG/ML
FSH SERPL-ACNC: 12.4 IU/L
LH SERPL-ACNC: 9.2 IU/L

## 2021-11-18 PROCEDURE — 83001 ASSAY OF GONADOTROPIN (FSH): CPT

## 2021-11-18 PROCEDURE — 82670 ASSAY OF TOTAL ESTRADIOL: CPT

## 2021-11-18 PROCEDURE — 36415 COLL VENOUS BLD VENIPUNCTURE: CPT

## 2021-11-18 PROCEDURE — 83002 ASSAY OF GONADOTROPIN (LH): CPT

## 2021-11-18 RX ORDER — LEVOTHYROXINE SODIUM 100 UG/1
100 TABLET ORAL DAILY
Qty: 30 TABLET | Refills: 0 | OUTPATIENT
Start: 2021-11-18

## 2021-11-18 NOTE — TELEPHONE ENCOUNTER
"Requested Prescriptions   Pending Prescriptions Disp Refills     levothyroxine (SYNTHROID/LEVOTHROID) 100 MCG tablet 30 tablet 0     Sig: Take 1 tablet (100 mcg) by mouth daily       Thyroid Protocol Passed - 11/18/2021  9:41 AM        Passed - Patient is 12 years or older        Passed - Recent (12 mo) or future (30 days) visit within the authorizing provider's specialty     Patient has had an office visit with the authorizing provider or a provider within the authorizing providers department within the previous 12 mos or has a future within next 30 days. See \"Patient Info\" tab in inbasket, or \"Choose Columns\" in Meds & Orders section of the refill encounter.              Passed - Medication is active on med list        Passed - Normal TSH on file in past 12 months     Recent Labs   Lab Test 11/09/21  1636   TSH 3.33              Passed - No active pregnancy on record     If patient is pregnant or has had a positive pregnancy test, please check TSH.          Passed - No positive pregnancy test in past 12 months     If patient is pregnant or has had a positive pregnancy test, please check TSH.             Last Written Prescription Date:  11/11/21  Last Fill Quantity: 30,  # refills: 0   Last office visit: 11/9/2021 with prescribing provider:  Cristofer   Future Office Visit:  None found          "

## 2021-11-18 NOTE — TELEPHONE ENCOUNTER
Refill request refused due to :   [x] Refills available at pharmacy.  Request sent back to pharmacy as requested too soon    Hannah Diego RN on 11/18/2021 at 9:46 AM

## 2021-12-15 ENCOUNTER — LAB (OUTPATIENT)
Dept: LAB | Facility: CLINIC | Age: 36
End: 2021-12-15
Payer: COMMERCIAL

## 2021-12-15 ENCOUNTER — MYC MEDICAL ADVICE (OUTPATIENT)
Dept: OBGYN | Facility: CLINIC | Age: 36
End: 2021-12-15

## 2021-12-15 ENCOUNTER — MYC MEDICAL ADVICE (OUTPATIENT)
Dept: MIDWIFE SERVICES | Facility: CLINIC | Age: 36
End: 2021-12-15

## 2021-12-15 DIAGNOSIS — E03.9 HYPOTHYROIDISM: ICD-10-CM

## 2021-12-15 DIAGNOSIS — E02 SUBCLINICAL IODINE-DEFICIENCY HYPOTHYROIDISM: ICD-10-CM

## 2021-12-15 DIAGNOSIS — Z31.41 FERTILITY TESTING: ICD-10-CM

## 2021-12-15 DIAGNOSIS — Z31.41 FERTILITY TESTING: Primary | ICD-10-CM

## 2021-12-15 DIAGNOSIS — Z11.59 NEED FOR HEPATITIS C SCREENING TEST: ICD-10-CM

## 2021-12-15 DIAGNOSIS — N97.0 INFERTILITY ASSOCIATED WITH ANOVULATION: Primary | ICD-10-CM

## 2021-12-15 LAB — HOLD SPECIMEN: NORMAL

## 2021-12-15 PROCEDURE — 84443 ASSAY THYROID STIM HORMONE: CPT

## 2021-12-15 PROCEDURE — 86803 HEPATITIS C AB TEST: CPT

## 2021-12-15 PROCEDURE — 83001 ASSAY OF GONADOTROPIN (FSH): CPT

## 2021-12-15 PROCEDURE — 36415 COLL VENOUS BLD VENIPUNCTURE: CPT

## 2021-12-15 PROCEDURE — 84439 ASSAY OF FREE THYROXINE: CPT

## 2021-12-15 PROCEDURE — 83002 ASSAY OF GONADOTROPIN (LH): CPT

## 2021-12-15 PROCEDURE — 82670 ASSAY OF TOTAL ESTRADIOL: CPT

## 2021-12-15 NOTE — TELEPHONE ENCOUNTER
Per 11/18/21 lab test:  Your FSH was up a little bit more then I would like.  Sometimes this can mean your body is working harder to produce a follicle to ovulate which can mean decreased ovarian reserve.  I would like to repeat the FSH, LH and estradiol next month on day 3, (or days 2-5).  They can vary month to month and I want to see if there are any trends.      Lab orders placed.Will check with lab to see if we are able to add to blood work completed today.      Karen Senior RN

## 2021-12-16 LAB
HCV AB SERPL QL IA: NONREACTIVE
T4 FREE SERPL-MCNC: 1.38 NG/DL (ref 0.76–1.46)
TSH SERPL DL<=0.005 MIU/L-ACNC: 0.33 MU/L (ref 0.4–4)

## 2021-12-17 LAB
ESTRADIOL SERPL-MCNC: 59 PG/ML
FSH SERPL-ACNC: 6.8 IU/L
LH SERPL-ACNC: 5.5 IU/L

## 2021-12-21 RX ORDER — LEVOTHYROXINE SODIUM 100 UG/1
100 TABLET ORAL DAILY
Qty: 30 TABLET | Refills: 0 | Status: SHIPPED | OUTPATIENT
Start: 2021-12-21 | End: 2022-01-19

## 2021-12-21 RX ORDER — LEVOTHYROXINE SODIUM 88 UG/1
88 TABLET ORAL DAILY
Qty: 30 TABLET | Refills: 0 | Status: SHIPPED | OUTPATIENT
Start: 2021-12-21 | End: 2022-01-19 | Stop reason: DRUGHIGH

## 2021-12-21 NOTE — TELEPHONE ENCOUNTER
I sent a detailed note to triage about her thyroid.  We need to change the dosing.  Was she going to do the semen analysis?  If not then she can call with her next cycle and start medication if she desires

## 2021-12-24 ENCOUNTER — NURSE TRIAGE (OUTPATIENT)
Dept: FAMILY MEDICINE | Facility: CLINIC | Age: 36
End: 2021-12-24
Payer: COMMERCIAL

## 2021-12-24 DIAGNOSIS — U07.1 INFECTION DUE TO 2019 NOVEL CORONAVIRUS: ICD-10-CM

## 2021-12-24 DIAGNOSIS — M79.10 MYALGIA: Primary | ICD-10-CM

## 2021-12-24 RX ORDER — IBUPROFEN 800 MG/1
800 TABLET, FILM COATED ORAL EVERY 8 HOURS PRN
Qty: 15 TABLET | Refills: 0 | Status: SHIPPED | OUTPATIENT
Start: 2021-12-24 | End: 2021-12-28

## 2021-12-24 NOTE — TELEPHONE ENCOUNTER
Pt called     2 days ago started having symptoms     yesterday positive COVID19     Having chills, low back pain, headache     Took APAP - not helping     No breathing concerns, but sometimes chest aching comes/goes     Temp today is 99.0 F     Discussed e-visit or virtual UC visit, but pt states she has trouble getting onto mychart. Advised ED if becomes very weak, SOB, or develops chest pain.     Pt has been taking APAP but wants to add Ibuprofen. However she doesn't have anyone to run to the pharmacy for her. Asking if PCP would send in a script of 800mg Ibuprofen so she can drive through the pharmacy to pick it up?     There are no virtual appointments available today for pt to schedule    Rachel MÁRQUEZ, Triage RN  [facility]       Reason for Disposition    [1] COVID-19 diagnosed by positive lab test AND [2] mild symptoms (e.g., cough, fever, others) AND [3] no complications or SOB    Additional Information    Negative: SEVERE difficulty breathing (e.g., struggling for each breath, speaks in single words)    Negative: Difficult to awaken or acting confused (e.g., disoriented, slurred speech)    Negative: Bluish (or gray) lips or face now    Negative: Shock suspected (e.g., cold/pale/clammy skin, too weak to stand, low BP, rapid pulse)    Negative: Sounds like a life-threatening emergency to the triager    Negative: [1] COVID-19 exposure AND [2] no symptoms    Negative: COVID-19 vaccine reaction suspected (e.g., fever, headache, muscle aches) occurring 1 to 3 days after getting vaccine    Negative: COVID-19 vaccine, questions about    Negative: [1] Lives with someone known to have influenza (flu test positive) AND [2] flu-like symptoms (e.g., cough, runny nose, sore throat, SOB; with or without fever)    Negative: [1] Adult with possible COVID-19 symptoms AND [2] triager concerned about severity of symptoms or other causes    Negative: COVID-19 and breastfeeding, questions  about    Negative: SEVERE or constant chest pain or pressure (Exception: mild central chest pain, present only when coughing)    Negative: MODERATE difficulty breathing (e.g., speaks in phrases, SOB even at rest, pulse 100-120)    Negative: [1] Headache AND [2] stiff neck (can't touch chin to chest)    Negative: MILD difficulty breathing (e.g., minimal/no SOB at rest, SOB with walking, pulse <100)    Negative: Chest pain or pressure    Negative: Patient sounds very sick or weak to the triager    Negative: [1] COVID-19 infection suspected by caller or triager AND [2] mild symptoms (cough, fever, or others) AND [3] negative COVID-19 rapid test    Negative: Fever present > 3 days (72 hours)    Negative: [1] Fever returns after gone for over 24 hours AND [2] symptoms worse or not improved    Negative: [1] Continuous (nonstop) coughing interferes with work or school AND [2] no improvement using cough treatment per protocol    Negative: Cough present > 3 weeks    Negative: Fever > 103 F (39.4 C)    Negative: [1] Fever > 101 F (38.3 C) AND [2] age > 60 years    Negative: [1] Fever > 100.0 F (37.8 C) AND [2] bedridden (e.g., nursing home patient, CVA, chronic illness, recovering from surgery)    Negative: HIGH RISK for severe COVID complications (e.g., age > 64 years, obesity with BMI > 25, pregnant, chronic lung disease or other chronic medical condition)  (Exception: Already seen by PCP and no new or worsening symptoms.)    Negative: [1] HIGH RISK patient AND [2] influenza is widespread in the community AND [3] ONE OR MORE respiratory symptoms: cough, sore throat, runny or stuffy nose    Negative: [1] HIGH RISK patient AND [2] influenza exposure within the last 7 days AND [3] ONE OR MORE respiratory symptoms: cough, sore throat, runny or stuffy nose    Negative: [1] COVID-19 diagnosed by positive lab test AND [2] NO symptoms (e.g., cough, fever, others)    Protocols used: CORONAVIRUS (COVID-19) DIAGNOSED OR [id number]  8.25.2021

## 2021-12-26 DIAGNOSIS — U07.1 INFECTION DUE TO 2019 NOVEL CORONAVIRUS: ICD-10-CM

## 2021-12-26 DIAGNOSIS — M79.10 MYALGIA: ICD-10-CM

## 2021-12-28 ENCOUNTER — TELEPHONE (OUTPATIENT)
Dept: FAMILY MEDICINE | Facility: CLINIC | Age: 36
End: 2021-12-28
Payer: COMMERCIAL

## 2021-12-28 RX ORDER — IBUPROFEN 800 MG/1
TABLET, FILM COATED ORAL
Qty: 15 TABLET | Refills: 0 | Status: SHIPPED | OUTPATIENT
Start: 2021-12-28 | End: 2022-04-22

## 2021-12-28 NOTE — TELEPHONE ENCOUNTER
Patient calling to return phone call from last week. Gave patient message that prescription has been sent to Ibuprofen.    Patient also calling to request antibiotics for sinus infection. Patient tested positive for covid 12/23 with symptoms starting 12/22. Patient feels severe amounts of sinus pressure and says it feels like sinus infection she has previously had. Explained to patient that sinus congestion is likely a symptom of covid, but will send message to provider for input.

## 2021-12-30 DIAGNOSIS — U07.1 INFECTION DUE TO 2019 NOVEL CORONAVIRUS: ICD-10-CM

## 2021-12-30 DIAGNOSIS — M79.10 MYALGIA: ICD-10-CM

## 2021-12-30 RX ORDER — IBUPROFEN 800 MG/1
TABLET, FILM COATED ORAL
Qty: 15 TABLET | Refills: 0 | OUTPATIENT
Start: 2021-12-30

## 2021-12-30 NOTE — TELEPHONE ENCOUNTER
Pt called back to schedule appointment with another provider per Dr. Benavidez's recommendation, see below. Video with Dr. Edouard scheduled tomorrow.     Pt insists it be in person because [employer] called her to return to work already if improving so she thought they were going by [facility] recommendation of 5 days quarantine. Tomorrow will be her 9th day.     Message sent requesting Dr. Edouard's preference if this appointment stay a video visit or change to in person?    Saba LOWERY RN  [facility]

## 2021-12-30 NOTE — TELEPHONE ENCOUNTER
Should be evaluated with provider. I am on my vacation, please have her to see other provider at the clinic  thx

## 2021-12-30 NOTE — TELEPHONE ENCOUNTER
Patient Contact     Attempt # 1     Was call answered?    No  Left non-detailed message to call the clinic back at [phone].      On call back:      -Relay Dr. Benavidez's message, see below.     Saba LOWERY RN  [facility]

## 2021-12-31 ENCOUNTER — VIRTUAL VISIT (OUTPATIENT)
Dept: FAMILY MEDICINE | Facility: CLINIC | Age: 36
End: 2021-12-31
Payer: COMMERCIAL

## 2021-12-31 DIAGNOSIS — J32.0 CHRONIC MAXILLARY SINUSITIS: ICD-10-CM

## 2021-12-31 DIAGNOSIS — J01.90 ACUTE SINUSITIS WITH SYMPTOMS > 10 DAYS: Primary | ICD-10-CM

## 2021-12-31 DIAGNOSIS — U07.1 LAB TEST POSITIVE FOR DETECTION OF COVID-19 VIRUS: ICD-10-CM

## 2021-12-31 PROCEDURE — 99214 OFFICE O/P EST MOD 30 MIN: CPT | Mod: 95 | Performed by: INTERNAL MEDICINE

## 2021-12-31 NOTE — PATIENT INSTRUCTIONS
As discussed, will treat this acute phase of your Sinusitis with Augmentin. Given the recurrences of your sinusitis more than 3-4x/ year you will need ENT consult for Nasal endoscopy to check for any nasal polyps, possible need of Sinus wash which will give relief and prevent such recurrences.     Please take copious warm fluids, Tylenol as needed.   Given COVID positive make sure to quarentine yourself for 10 days as we didn't implement 5 days yet as per confirmation with [place].    =============================    Patient Education     Acute Bacterial Rhinosinusitis (ABRS)    Acute bacterial rhinosinusitis (ABRS) is an infection of your nasal cavity and sinuses. It s caused by bacteria. Acute means that you ve had symptoms for less than 4 weeks, but possibly up to 12 weeks.  Understanding your sinuses  The nasal cavity is the large air-filled space behind your nose. The sinuses are a group of spaces formed by the bones of your face. They connect with your nasal cavity. ABRS causes the tissue lining these spaces to become inflamed. Mucus may not drain normally. This leads to facial pain and other symptoms.  What causes ABRS?  ABRS most often follows an upper respiratory infection caused by a virus. Bacteria then infect the lining of your nasal cavity and sinuses. But you can also get ABRS if you have:    Nasal allergies    Long-term nasal swelling and congestion not caused by allergies    Blockage in the nose  Symptoms of ABRS  The symptoms of ABRS may be different for each person and include:    Nasal congestion or blockage    Pain or pressure in the face    Thick, colored drainage from the nose  Other symptoms may include:    Runny nose    Fluid draining from the nose down the throat (postnasal drip)    Headache    Cough    Pain    Fever  Diagnosing ABRS  ABRS may be diagnosed if you ve had an upper respiratory infection like a cold and cough for 10 or more days without improvement or with worsening symptoms.  Your healthcare provider will ask about your symptoms and your medical history. The provider will check your vital signs, including your temperature. You ll have a physical exam. The healthcare provider will check your ears, nose, and throat. You likely won t need any tests. If ABRS comes back, you may have a culture or other tests.  Treatment for ABRS  Treatment may include:    Antibiotic medicine. This is for symptoms that last for at least 10 to 14 days.    Nasal corticosteroid medicine. Drops or spray used in the nose can lessen swelling and congestion.    Over-the-counter pain medicine. This is to lessen sinus pain and pressure.    Nasal decongestant medicine. Spray or drops may help to lessen congestion. Do not use them for more than a few days.    Salt wash (saline irrigation). This can help to loosen mucus.  Possible complications of ABRS  ABRS may come back or become long-term (chronic). In rare cases, ABRS may cause complications such as:     Inflamed tissue around the brain and spinal cord (meningitis)    Inflamed tissue around the eyes (orbital cellulitis)    Inflamed bones around the sinuses (osteitis)  These problems may need to be treated in a hospital with intravenous (IV) antibiotic medicine or surgery.  When to call the healthcare provider  Call your healthcare provider if you have any of the following:    Symptoms that don t get better, or get worse    Symptoms that don t get better after 3 to 5 days on antibiotics    Trouble seeing    Swelling around your eyes    Confusion or trouble staying awake   [employer] last reviewed this educational content on 5/1/2017 2000-2021 [employer]. All rights reserved. This information is not intended as a substitute for professional medical care. Always follow your healthcare professional's instructions.    As you recover from COVID-19     Patients who have symptoms (cough, fever, or shortness of breath), need to isolate for 10 days from when  symptoms started AND 72 hours after fever resolves (without fever reducing medications) AND improvement of respiratory symptoms (whichever is longer).     During this time:    Isolate yourself at home (in own room/own bathroom if possible)    Do not allow any visitors    Do not go to work or school    Do not go to [employer],  centers, shopping, or other public places    Do not shake hands    Avoid close and intimate contact with others (hugging, kissing)    Follow CDC recommendations for household cleaning of frequently touched services     After the initial 10 days, continue to isolate yourself from household members as much as possible. To continue decrease the risk of community spread and exposure, you and any members of your household should limit activities in public for 14 days after starting home isolation.      You can reference the following CDC link for helpful home isolation/care tips:  https://www.cdc.gov/coronavirus/2019-ncov/downloads/10Things.pdf     Protect Others:    Cover your mouth and nose with a mask, disposable tissue or wash cloth to avoid spreading germs.    Wash your hands and face often. Use soap and water     Call Back If: Breathing difficulty develops or you become worse.        For more information about COVID19 and options for caring for yourself at home, please visit the CDC website at https://www.cdc.gov/coronavirus/2019-ncov/about/steps-when-sick.html  For more options for care at [facility], please visit our website at [email or web address]    For information about [facility] COVID-19 Clinical Trials, please visit [email or web address]     For more information, please use the [place] Department of Health COVID-19 Website: https://www.health.state.[place]/diseases/coronavirus/index.html  [place] Department of Health ([facility]) COVID-19 Hotlines (Interpreters   available):                Health questions:  Phone Number: [phone] or 1-950.426.5841 and Hours: 7 a.m. to 7 p.m.    Schools and  questions: Phone Number: [phone] or 1-297.911.1415 and Hours 7 a.m. to 7 p.m.     Coping with Life After COVID-19  Being in the hospital because of COVID-19 is scary. Going home can be scary, too. You may face changes to your life, the way you work or what you can eat. It s hard to adjust to change, and it s normal to feel afraid, frustrated or even angry. These feelings usually go away over time. If your feelings don t start to get better, it s called  adjustment disorder.       What signs should I look out for?  Adjustment disorder can happen to anyone in a time of stress. It makes it hard to cope with daily life. You may feel lonely or fight with loved ones, even if you re glad to be home. Watch for these signs:    Fear or worry    Hard time focusing    Sadness or anger    Trouble talking to family or friends    Feeling like you don t fit in or isolating yourself    Problems with sleep     Drinking alcohol or taking drugs to cope     What can I do?  You can help yourself get better. Feeling you have control helps you move forward. You may wonder if you ll be able to do things you did before. Be patient. Do your best to make the most of every day. Try to build relationships, be as active as you can, eat right and keep a sense of humor. Avoid smoking and drinking too much alcohol. Call your family doctor or clinic if you re not sure what to do. They can guide you to care or other services.     When should I get help?  Think about getting counseling if your sadness or frustration gets worse. Together with a trained counselor, you can talk about your problems adjusting to life after your hospital stay. You can come up with new ways to handle changes that give you more control. Your family doctor or care team can help you find a counselor.      Get help if you re thinking about hurting yourself. If you need help  right away, call 911 or go to the nearest emergency room. You can also try the Crisis Text Line.     Crisis Text Line: 029-041 (http://www.crisistextline.org)  The Crisis Text Line serves anyone, in any crisis. It gives free, 24/7 support. Here's how it works:  1. Text HOME to 223642 from anywhere in the [place], anytime, about any type of crisis.  2. A live, trained Crisis Counselor will text you back quickly.  3. The volunteer Crisis Counselor can help you move from a  hot moment  to a  cool moment.  They can also help you work out a safety plan.

## 2021-12-31 NOTE — PROGRESS NOTES
Dick is a 36 year old who is being evaluated via a billable video visit.      How would you like to obtain your AVS? [employer]  If the video visit is dropped, the invitation should be resent by: Text to cell phone: [phone]  Will anyone else be joining your video visit? No    Video Start Time: Start: 12/31/2021 01:35 pm    Assessment and Plan  1. Acute sinusitis with symptoms > 10 days  Recurrence of acute sinusitis. Will give Augmentin and conservative measures discussed with the patient. Once this acute phase resolves , pt will need to be seen by ENT as mentioned in AVS. Please see below for details.   - amoxicillin-clavulanate (AUGMENTIN) 875-125 MG tablet; Take 1 tablet by mouth 2 times daily  Dispense: 20 tablet; Refill: 0    2. Chronic maxillary sinusitis  - Otolaryngology Referral; Future    3. Lab test positive for detection of COVID-19 virus  Recent positive for COVID on 12/23/21 as per patient.     Patient Instructions     As discussed, will treat this acute phase of your Sinusitis with Augmentin. Given the recurrences of your sinusitis more than 3-4x/ year you will need ENT consult for Nasal endoscopy to check for any nasal polyps, possible need of Sinus wash which will give relief and prevent such recurrences.     Please take copious warm fluids, Tylenol as needed.   Given COVID positive make sure to quarentine yourself for 10 days as we didn't implement 5 days yet as per confirmation with [place].    =============================    Patient Education     Acute Bacterial Rhinosinusitis (ABRS)    Acute bacterial rhinosinusitis (ABRS) is an infection of your nasal cavity and sinuses. It s caused by bacteria. Acute means that you ve had symptoms for less than 4 weeks, but possibly up to 12 weeks.  Understanding your sinuses  The nasal cavity is the large air-filled space behind your nose. The sinuses are a group of spaces formed by the bones of your face. They connect with your nasal cavity. ABRS causes  the tissue lining these spaces to become inflamed. Mucus may not drain normally. This leads to facial pain and other symptoms.  What causes ABRS?  ABRS most often follows an upper respiratory infection caused by a virus. Bacteria then infect the lining of your nasal cavity and sinuses. But you can also get ABRS if you have:    Nasal allergies    Long-term nasal swelling and congestion not caused by allergies    Blockage in the nose  Symptoms of ABRS  The symptoms of ABRS may be different for each person and include:    Nasal congestion or blockage    Pain or pressure in the face    Thick, colored drainage from the nose  Other symptoms may include:    Runny nose    Fluid draining from the nose down the throat (postnasal drip)    Headache    Cough    Pain    Fever  Diagnosing ABRS  ABRS may be diagnosed if you ve had an upper respiratory infection like a cold and cough for 10 or more days without improvement or with worsening symptoms. Your healthcare provider will ask about your symptoms and your medical history. The provider will check your vital signs, including your temperature. You ll have a physical exam. The healthcare provider will check your ears, nose, and throat. You likely won t need any tests. If ABRS comes back, you may have a culture or other tests.  Treatment for ABRS  Treatment may include:    Antibiotic medicine. This is for symptoms that last for at least 10 to 14 days.    Nasal corticosteroid medicine. Drops or spray used in the nose can lessen swelling and congestion.    Over-the-counter pain medicine. This is to lessen sinus pain and pressure.    Nasal decongestant medicine. Spray or drops may help to lessen congestion. Do not use them for more than a few days.    Salt wash (saline irrigation). This can help to loosen mucus.  Possible complications of ABRS  ABRS may come back or become long-term (chronic). In rare cases, ABRS may cause complications such as:     Inflamed tissue around the brain  and spinal cord (meningitis)    Inflamed tissue around the eyes (orbital cellulitis)    Inflamed bones around the sinuses (osteitis)  These problems may need to be treated in a hospital with intravenous (IV) antibiotic medicine or surgery.  When to call the healthcare provider  Call your healthcare provider if you have any of the following:    Symptoms that don t get better, or get worse    Symptoms that don t get better after 3 to 5 days on antibiotics    Trouble seeing    Swelling around your eyes    Confusion or trouble staying awake   [employer] last reviewed this educational content on 5/1/2017 2000-2021 [employer]. All rights reserved. This information is not intended as a substitute for professional medical care. Always follow your healthcare professional's instructions.    As you recover from COVID-19     Patients who have symptoms (cough, fever, or shortness of breath), need to isolate for 10 days from when symptoms started AND 72 hours after fever resolves (without fever reducing medications) AND improvement of respiratory symptoms (whichever is longer).     During this time:    Isolate yourself at home (in own room/own bathroom if possible)    Do not allow any visitors    Do not go to work or school    Do not go to [employer],  centers, shopping, or other public places    Do not shake hands    Avoid close and intimate contact with others (hugging, kissing)    Follow CDC recommendations for household cleaning of frequently touched services     After the initial 10 days, continue to isolate yourself from household members as much as possible. To continue decrease the risk of community spread and exposure, you and any members of your household should limit activities in public for 14 days after starting home isolation.      You can reference the following CDC link for helpful home isolation/care tips:  https://www.cdc.gov/coronavirus/2019-ncov/downloads/10Things.pdf     Protect  Others:    Cover your mouth and nose with a mask, disposable tissue or wash cloth to avoid spreading germs.    Wash your hands and face often. Use soap and water     Call Back If: Breathing difficulty develops or you become worse.        For more information about COVID19 and options for caring for yourself at home, please visit the CDC website at https://www.cdc.gov/coronavirus/2019-ncov/about/steps-when-sick.html  For more options for care at [facility], please visit our website at [email or web address]    For information about [facility] COVID-19 Clinical Trials, please visit [email or web address]     For more information, please use the [place] Department of Health COVID-19 Website: https://[email or web address]/diseases/coronavirus/index.html  [place] Department of Health ([facility]) COVID-19 Hotlines (Interpreters   available):                Health questions: Phone Number: 257.559.7842 or 1-384.320.4907 and Hours: 7 a.m. to 7 p.m.    Schools and  questions: Phone Number: [phone] or [phone] and Hours 7 a.m. to 7 p.m.     Coping with Life After COVID-19  Being in the hospital because of COVID-19 is scary. Going home can be scary, too. You may face changes to your life, the way you work or what you can eat. It s hard to adjust to change, and it s normal to feel afraid, frustrated or even angry. These feelings usually go away over time. If your feelings don t start to get better, it s called  adjustment disorder.       What signs should I look out for?  Adjustment disorder can happen to anyone in a time of stress. It makes it hard to cope with daily life. You may feel lonely or fight with loved ones, even if you re glad to be home. Watch for these signs:    Fear or worry    Hard time focusing    Sadness or anger    Trouble talking to family or friends    Feeling like you don t fit in or isolating yourself    Problems  with sleep     Drinking alcohol or taking drugs to cope     What can I do?  You can help yourself get better. Feeling you have control helps you move forward. You may wonder if you ll be able to do things you did before. Be patient. Do your best to make the most of every day. Try to build relationships, be as active as you can, eat right and keep a sense of humor. Avoid smoking and drinking too much alcohol. Call your family doctor or clinic if you re not sure what to do. They can guide you to care or other services.     When should I get help?  Think about getting counseling if your sadness or frustration gets worse. Together with a trained counselor, you can talk about your problems adjusting to life after your hospital stay. You can come up with new ways to handle changes that give you more control. Your family doctor or care team can help you find a counselor.      Get help if you re thinking about hurting yourself. If you need help right away, call 911 or go to the nearest emergency room. You can also try the Crisis Text Line.     Crisis Text Line: 027-161 (http://www.crisistextline.org)  The Crisis Text Line serves anyone, in any crisis. It gives free, 24/7 support. Here's how it works:  1. Text HOME to 821227 from anywhere in the [place], anytime, about any type of crisis.  2. A live, trained Crisis Counselor will text you back quickly.  3. The volunteer Crisis Counselor can help you move from a  hot moment  to a  cool moment.  They can also help you work out a safety plan.                         Return in about 3 months (around 3/31/2022), or if symptoms worsen or fail to improve, for Preventative Visit.    Mohini Edouard MD  [facility] [place]      Subjective   Dick is a 36 year old who presents for the following health issues       HPI     Acute Illness  Acute illness concerns: sinus pressure  COVID positive 12/23/21  Onset/Duration: 1 week  Symptoms: bloated stomach  Fever:  no  Chills/Sweats: no  Headache (location?): no  Sinus Pressure: YES  Conjunctivitis:  no  Ear Pain: no  Rhinorrhea: no  Congestion: YES  Sore Throat: no  Cough: no  Wheeze: no  Decreased Appetite: no  Nausea: no  Vomiting: no  Diarrhea: no  Dysuria/Freq.: no  Dysuria or Hematuria: no  Fatigue/Achiness: no  Sick/Strep Exposure: no  Therapies tried and outcome: None       Allergies   Allergen Reactions     Pseudoephedrine Other (See Comments)     Dizziness, leg weakness  Other reaction(s): Other, see comments  Dizziness, leg weakness        Past Medical History:   Diagnosis Date     Diabetes (H)     Gestational diabetes diet controlled     Hyperemesis gravidarum, antepartum 10/5/2018     Thyroid disease        Past Surgical History:   Procedure Laterality Date     NO HISTORY OF SURGERY         Family History   Problem Relation Age of Onset     Diabetes Mother      Hypertension Mother        Social History     Tobacco Use     Smoking status: Never Smoker     Smokeless tobacco: Never Used     Tobacco comment: N/A   Substance Use Topics     Alcohol use: Never        Current Outpatient Medications   Medication     amoxicillin-clavulanate (AUGMENTIN) 875-125 MG tablet     ibuprofen (ADVIL/MOTRIN) 800 MG tablet     levothyroxine (SYNTHROID/LEVOTHROID) 100 MCG tablet     levothyroxine (SYNTHROID/LEVOTHROID) 88 MCG tablet     cefPROZIL (CEFZIL) 500 MG tablet     No current facility-administered medications for this visit.        Review of Systems   Constitutional, HEENT, cardiovascular, pulmonary, GI, , musculoskeletal, neuro, skin, endocrine and psych systems are negative, except as otherwise noted.      Objective           Vitals:  No vitals were obtained today due to virtual visit.    Physical Exam   GENERAL: Healthy, alert and no distress  EYES: Eyes grossly normal to inspection.  No discharge or erythema, or obvious scleral/conjunctival abnormalities.  RESP: No audible wheeze, cough, or visible cyanosis.  No visible  retractions or increased work of breathing.    SKIN: Visible skin clear. No significant rash, abnormal pigmentation or lesions.  NEURO: Cranial nerves grossly intact.  Mentation and speech appropriate for age.  PSYCH: Mentation appears normal, affect normal/bright, judgement and insight intact, normal speech and appearance well-groomed.      Video-Visit Details    Type of service:  Video Visit    Video End Time:Stop: 12/31/2021 01:51 pm    Originating Location (pt. Location): Home    Distant Location (provider location):  [facility]     Platform used for Video Visit: Busportal

## 2022-01-05 NOTE — TELEPHONE ENCOUNTER
FUTURE VISIT INFORMATION      FUTURE VISIT INFORMATION:    Date: 2/23/22    Time: 3PM    [place] [facility] [place]FORMATION:    Referring provider:  Mohini Edouard MD    Referring providers clinic:  [facility] [place]     Reason for visit/diagnosis  Chronic maxillary sinusitis, recs in epic, ref by Mohini Edouard MD in  FP/IM/PEDS, appt made by pt (needed last appt of day)    RECORDS REQUESTED FROM:       Clinic name Comments Records Status Imaging Status   [facility] Sandie [place]  12/31/21 note from Mohini Edouard MD [facility] Emergency Room   2/9/2021 ED note from Molly Faye MD  Care everywhere

## 2022-01-06 ENCOUNTER — ANCILLARY PROCEDURE (OUTPATIENT)
Dept: GENERAL RADIOLOGY | Facility: CLINIC | Age: 37
End: 2022-01-06
Attending: FAMILY MEDICINE
Payer: COMMERCIAL

## 2022-01-06 ENCOUNTER — VIRTUAL VISIT (OUTPATIENT)
Dept: FAMILY MEDICINE | Facility: CLINIC | Age: 37
End: 2022-01-06
Payer: COMMERCIAL

## 2022-01-06 ENCOUNTER — ALLIED HEALTH/NURSE VISIT (OUTPATIENT)
Dept: FAMILY MEDICINE | Facility: CLINIC | Age: 37
End: 2022-01-06
Payer: COMMERCIAL

## 2022-01-06 DIAGNOSIS — R05.9 COUGH: Primary | ICD-10-CM

## 2022-01-06 DIAGNOSIS — U07.1 INFECTION DUE TO 2019 NOVEL CORONAVIRUS: ICD-10-CM

## 2022-01-06 DIAGNOSIS — R19.7 DIARRHEA, UNSPECIFIED TYPE: ICD-10-CM

## 2022-01-06 DIAGNOSIS — R05.9 COUGH: ICD-10-CM

## 2022-01-06 PROCEDURE — 99207 PR NO CHARGE NURSE ONLY: CPT

## 2022-01-06 PROCEDURE — 71046 X-RAY EXAM CHEST 2 VIEWS: CPT | Performed by: RADIOLOGY

## 2022-01-06 PROCEDURE — 87804 INFLUENZA ASSAY W/OPTIC: CPT | Performed by: FAMILY MEDICINE

## 2022-01-06 PROCEDURE — 99214 OFFICE O/P EST MOD 30 MIN: CPT | Mod: 95 | Performed by: FAMILY MEDICINE

## 2022-01-06 RX ORDER — BENZONATATE 100 MG/1
100 CAPSULE ORAL 3 TIMES DAILY PRN
Qty: 21 CAPSULE | Refills: 0 | Status: SHIPPED | OUTPATIENT
Start: 2022-01-06 | End: 2022-01-26

## 2022-01-06 RX ORDER — LOPERAMIDE HYDROCHLORIDE 2 MG/1
2 TABLET ORAL 3 TIMES DAILY PRN
Qty: 15 TABLET | Refills: 0 | Status: SHIPPED | OUTPATIENT
Start: 2022-01-06 | End: 2023-01-23

## 2022-01-06 NOTE — PROGRESS NOTES
Dick is a 36 year old who is being evaluated via a billable video visit.      How would you like to obtain your AVS? MyChart  If the video visit is dropped, the invitation should be resent by: Text to cell phone: [phone]  Will anyone else be joining your video visit? No      Video Start Time: 10:30    Assessment & Plan     Cough  Has worsening cough but no Fever nor chill, feeling tired, she was diagnosed COVID-19 2 weeks ago  She is no Augmentin for now for potential superimposed bacterial sinus infection per Dr Edouard   Will have her to check CXR and influenza screening test for further evaluation   - XR Chest 2 Views; Future  - Influenza A & B Antigen - Clinic Collect    Infection due to 2019 novel coronavirus  mentioned above   - XR Chest 2 Views; Future    Diarrhea, unspecified type  Has started after starting abx, will have her to try imodium, and encouraged fluid intake   - loperamide (IMODIUM A-D) 2 MG tablet; Take 1 tablet (2 mg) by mouth 3 times daily as needed for diarrhea             FUTURE APPOINTMENTS:       - Follow-up visit in 1-2 week if not improving     Return if symptoms worsen or fail to improve.    Viet Benavidez MD  [facility] [place] Jennings is a 36 year old who presents for the following health issues     HPI       Concern for COVID-19  About how many days ago did these symptoms start? 12/22/2021  Is this your first visit for this illness? No   How would you describe your symptoms since your last visit? My symptoms have stayed the same  In the 14 days before your symptoms started, have you had close contact with someone with COVID-19 (Coronavirus)? Yes, I have been in contact with someone who has COVID-19/Coronavirus (confirmed by lab test).  Do you have a fever or chills? Yes, I felt feverish or had chills  Are you having new or worsening difficulty breathing? No  Do you have new or worsening cough? Yes, it's a dry cough.   Have you had any new or  unexplained body aches? No    Have you experienced any of the following NEW symptoms?    Headache: YES    Sore throat: No    Loss of taste or smell: No    Chest pain: No    Diarrhea: YES    Rash: No  What treatments have you tried? Ibuprofen, Tylenol. Augmentin, started (12/31/21)  Who do you live with?  and daughter  Are you, or a household member, a healthcare worker or a ? YES  Do you live in a nursing home, group home, or shelter? No  Do you have a way to get food/medications if quarantined? Yes, I have a friend or family member who can help me.              Review of Systems   Constitutional, HEENT, cardiovascular, pulmonary, gi and gu systems are negative, except as otherwise noted.      Objective           Vitals:  No vitals were obtained today due to virtual visit.    Physical Exam   GENERAL: Healthy, alert and no distress  EYES: Eyes grossly normal to inspection.  No discharge or erythema, or obvious scleral/conjunctival abnormalities.  RESP: No audible wheeze, cough, or visible cyanosis.  No visible retractions or increased work of breathing.    SKIN: Visible skin clear. No significant rash, abnormal pigmentation or lesions.  NEURO: Cranial nerves grossly intact.  Mentation and speech appropriate for age.  PSYCH: Mentation appears normal, affect normal/bright, judgement and insight intact, normal speech and appearance well-groomed.                Video-Visit Details    Type of service:  Video Visit    Video End Time:11:10 AM    Originating Location (pt. Location): Home    Distant Location (provider location):  [facility]     Platform used for Video Visit: Desigual

## 2022-01-06 NOTE — PROGRESS NOTES
Per order from Dr. Benavidez earlier in the day, swabbed for Influenza A&B.    Lisha Turner on 1/6/2022 at 5:07 PM

## 2022-01-07 LAB
FLUAV AG SPEC QL IA: NEGATIVE
FLUBV AG SPEC QL IA: NEGATIVE

## 2022-01-10 NOTE — TELEPHONE ENCOUNTER
CD 3 today 1/10/2022    OV w Dr Cleveland 11/9/2021 - 6. Discussed possible cycles.  Discussed possible clomid or femara days 5-9 of her cycle.  The would have her do an ultrasound day 12-14 of her cycle to see how she responds.  At that time could do ovidrel or timed intercourse alone.     Megan Cleveland MD    Routing to provider - how to start: Clomid vs Femara     Renata Lazar RN on 1/10/2022 at 9:06 AM

## 2022-01-11 RX ORDER — LETROZOLE 2.5 MG/1
2.5 TABLET, FILM COATED ORAL DAILY
Qty: 5 TABLET | Refills: 0 | Status: SHIPPED | OUTPATIENT
Start: 2022-01-11 | End: 2022-02-04

## 2022-01-18 ENCOUNTER — LAB (OUTPATIENT)
Dept: LAB | Facility: CLINIC | Age: 37
End: 2022-01-18
Payer: COMMERCIAL

## 2022-01-18 DIAGNOSIS — E03.9 HYPOTHYROIDISM: ICD-10-CM

## 2022-01-18 PROCEDURE — 84439 ASSAY OF FREE THYROXINE: CPT

## 2022-01-18 PROCEDURE — 36415 COLL VENOUS BLD VENIPUNCTURE: CPT

## 2022-01-18 PROCEDURE — 84443 ASSAY THYROID STIM HORMONE: CPT

## 2022-01-19 DIAGNOSIS — E02 SUBCLINICAL IODINE-DEFICIENCY HYPOTHYROIDISM: Primary | ICD-10-CM

## 2022-01-19 LAB
T4 FREE SERPL-MCNC: 1.48 NG/DL (ref 0.76–1.46)
TSH SERPL DL<=0.005 MIU/L-ACNC: 0.37 MU/L (ref 0.4–4)

## 2022-01-19 RX ORDER — LEVOTHYROXINE SODIUM 50 UG/1
50 TABLET ORAL DAILY
Qty: 30 TABLET | Refills: 4 | Status: SHIPPED | OUTPATIENT
Start: 2022-01-19 | End: 2023-01-23

## 2022-01-21 ENCOUNTER — VIRTUAL VISIT (OUTPATIENT)
Dept: OBGYN | Facility: CLINIC | Age: 37
End: 2022-01-21
Payer: COMMERCIAL

## 2022-01-21 ENCOUNTER — TRANSFERRED RECORDS (OUTPATIENT)
Dept: HEALTH INFORMATION MANAGEMENT | Facility: CLINIC | Age: 37
End: 2022-01-21

## 2022-01-21 DIAGNOSIS — N97.0 INFERTILITY ASSOCIATED WITH ANOVULATION: Primary | ICD-10-CM

## 2022-01-21 DIAGNOSIS — E02 SUBCLINICAL IODINE-DEFICIENCY HYPOTHYROIDISM: ICD-10-CM

## 2022-01-21 PROCEDURE — 99213 OFFICE O/P EST LOW 20 MIN: CPT | Mod: 95 | Performed by: OBSTETRICS & GYNECOLOGY

## 2022-01-21 RX ORDER — CHORIOGONADOTROPIN ALFA 250 UG/.5ML
250 INJECTION, SOLUTION SUBCUTANEOUS ONCE
Qty: 0.5 ML | Refills: 0 | Status: SHIPPED | OUTPATIENT
Start: 2022-01-21 | End: 2023-01-23

## 2022-01-21 NOTE — PROGRESS NOTES
Rocio Elkins is a 36 year old female who is being evaluated via a billable telephone visit.      What phone number would you like to be contacted at?   How would you like to obtain your AVS?     Rocio Elkins had a follicle study on Day # 14    This is cycle 1 of Femara.    She has taken Femara 5 mg Day #5-9 of this cycle.    Follicle study shows She has a 20 mm follicle on the right, and endometrial lining is appropriate.      Options  1. Ovidrel today whenever you get it, intercourse tonight and Sunday  2. [place] today, Sunday, Tuesday.     Ovidrel sent to pharmacy.  She is going to use this next month if she doesn't get pregnant.       ASSESSMENT/PLAN:                                                      Phone call duration: 8 minutes 56 seconds, total time charting and reviewing imaging, 14 minutes      ICD-10-CM    1. Infertility associated with anovulation  N97.0    2. Subclinical iodine-deficiency hypothyroidism  E02        1. She has decreased her synthroid, TSH was .37, will check in another 3-4 weeks  2. Discussed process of intercourse and timing  3. She will do ovidrel next month if no pregnancy.   4. She would like to try clomid next month due to side effects.  Clomid 100 mg days 5-9.     Megan Cleveland MD  [facility] [place]

## 2022-01-26 ENCOUNTER — OFFICE VISIT (OUTPATIENT)
Dept: FAMILY MEDICINE | Facility: CLINIC | Age: 37
End: 2022-01-26
Payer: COMMERCIAL

## 2022-01-26 VITALS — DIASTOLIC BLOOD PRESSURE: 62 MMHG | OXYGEN SATURATION: 100 % | SYSTOLIC BLOOD PRESSURE: 100 MMHG | HEART RATE: 77 BPM

## 2022-01-26 DIAGNOSIS — R14.0 ABDOMINAL BLOATING: Primary | ICD-10-CM

## 2022-01-26 PROCEDURE — 99213 OFFICE O/P EST LOW 20 MIN: CPT | Performed by: INTERNAL MEDICINE

## 2022-01-26 RX ORDER — FAMOTIDINE 20 MG/1
20 TABLET, FILM COATED ORAL 2 TIMES DAILY
Qty: 60 TABLET | Refills: 1 | Status: SHIPPED | OUTPATIENT
Start: 2022-01-26 | End: 2022-01-26

## 2022-01-26 RX ORDER — DICYCLOMINE HYDROCHLORIDE 10 MG/1
10 CAPSULE ORAL 4 TIMES DAILY PRN
Qty: 20 CAPSULE | Refills: 1 | Status: SHIPPED | OUTPATIENT
Start: 2022-01-26 | End: 2023-01-23

## 2022-01-26 RX ORDER — DICYCLOMINE HYDROCHLORIDE 10 MG/1
10 CAPSULE ORAL 4 TIMES DAILY PRN
Qty: 20 CAPSULE | Refills: 1 | Status: SHIPPED | OUTPATIENT
Start: 2022-01-26 | End: 2022-01-26

## 2022-01-26 RX ORDER — FAMOTIDINE 20 MG/1
20 TABLET, FILM COATED ORAL 2 TIMES DAILY
Qty: 60 TABLET | Refills: 1 | Status: SHIPPED | OUTPATIENT
Start: 2022-01-26 | End: 2023-01-23

## 2022-01-26 RX ORDER — ONDANSETRON 4 MG/1
4 TABLET, FILM COATED ORAL EVERY 8 HOURS PRN
Qty: 20 TABLET | Refills: 1 | Status: SHIPPED | OUTPATIENT
Start: 2022-01-26 | End: 2022-01-26

## 2022-01-26 RX ORDER — ONDANSETRON 4 MG/1
4 TABLET, FILM COATED ORAL EVERY 8 HOURS PRN
Qty: 20 TABLET | Refills: 1 | Status: SHIPPED | OUTPATIENT
Start: 2022-01-26 | End: 2022-04-22

## 2022-01-26 ASSESSMENT — PAIN SCALES - GENERAL: PAINLEVEL: EXTREME PAIN (8)

## 2022-01-26 NOTE — PROGRESS NOTES
Assessment & Plan     Abdominal bloating  Does have a diagnostic study with GI next week.  Will try some medications for symptom management in the interim.  Can use simethicone OTC as well.    - ondansetron (ZOFRAN) 4 MG tablet; Take 1 tablet (4 mg) by mouth every 8 hours as needed for nausea  - famotidine (PEPCID) 20 MG tablet; Take 1 tablet (20 mg) by mouth 2 times daily  - dicyclomine (BENTYL) 10 MG capsule; Take 1 capsule (10 mg) by mouth 4 times daily as needed (abdominal cramping)            Return in about 1 week (around 2/2/2022) for with GI .    Fanny Desir MD  [facility]    Radha Jennings is a 36 year old who presents for the following health issues     HPI     Dick is here with bothersome upper GI symptoms.  She has had issues with bloating, epigastric pain, and nausea on and off for a while.  She was first having endoscopy a while ago but that did not happen since she was traveling.  She is scheduled for an upper endoscopy next week with Dr. Donahue.  But she is feeling pretty miserable.  Significant abdominal bloating, gastric pain, nausea, decreased appetite.  Intermittent diffuse abdominal cramping.  No diarrhea or vomiting.  Not currently taking any medications for the symptoms.  Of note, she is undergoing infertility treatment.  Didn't notice the symptoms get much worse with the femara.  She was prescribed ovidrel but didn't take it this month.     GERD/Heartburn  Onset/Duration: 2 days   Description: bloating, burning, abdominal pain  Intensity: moderate  Progression of Symptoms: worsening  Accompanying Signs & Symptoms:  Does it feel like food gets stuck or trouble swallowing: no  Nausea: no  Vomiting (bloody?): no  Abdominal Pain: YES  Black-Tarry stools: no  Bloody stools: no  History:  Previous similar episodes: YES  Previous ulcers: no  Precipitating factors:   Caffeine use: no  Alcohol use: no  NSAID/Aspirin use: no  Tobacco use: no  Worse with no particular  food or drink.  Alleviating factors: None  Therapies tried and outcome:             Lifestyle changes: None            Medications: Omeprazole (Prilosec), in the past      Review of Systems   Const, gi, gu reviewed,  otherwise negative unless noted above.        Objective    /62 (Cuff Size: Adult Large)   Pulse 77   SpO2 100%   There is no height or weight on file to calculate BMI.  Physical Exam   Gen: uncomfortable appearing, pleasant woman, no distress  Pulm: breathing comfortably, CTAB, no wheezes or rales  CV: RRR, normal S1 and S2, no murmurs  Abd: BS present, soft, moderate distention, mild periumbilical tenderness  Ext: 2+ distal pulses, no LE edema

## 2022-01-29 ENCOUNTER — HEALTH MAINTENANCE LETTER (OUTPATIENT)
Age: 37
End: 2022-01-29

## 2022-02-01 NOTE — PATIENT INSTRUCTIONS
Try Azo over the counter for urinary symptoms    What Type Of Note Output Would You Prefer (Optional)?: Standard Output How Severe Is Your Acne?: moderate Is This A New Presentation, Or A Follow-Up?: Acne

## 2022-02-02 ENCOUNTER — TRANSFERRED RECORDS (OUTPATIENT)
Dept: HEALTH INFORMATION MANAGEMENT | Facility: CLINIC | Age: 37
End: 2022-02-02
Payer: COMMERCIAL

## 2022-02-02 PROCEDURE — 88305 TISSUE EXAM BY PATHOLOGIST: CPT | Performed by: PATHOLOGY

## 2022-02-03 ENCOUNTER — LAB REQUISITION (OUTPATIENT)
Dept: LAB | Facility: CLINIC | Age: 37
End: 2022-02-03

## 2022-02-03 DIAGNOSIS — R12 HEARTBURN: ICD-10-CM

## 2022-02-04 RX ORDER — LETROZOLE 2.5 MG/1
5 TABLET, FILM COATED ORAL DAILY
Qty: 10 TABLET | Refills: 0 | Status: SHIPPED | OUTPATIENT
Start: 2022-02-04 | End: 2023-01-23

## 2022-02-04 NOTE — TELEPHONE ENCOUNTER
Pt calling in stating that she is on Cycle Day 1.  Pt requesting medication to start on Cycle Day 5  Pt used femara in the past.  Do you want to repeat this?    Routing pt Jana Mobile message to provider to advise.  Stacie Del Castillo RN on 2/4/2022 at 10:52 AM

## 2022-02-07 DIAGNOSIS — E02 SUBCLINICAL IODINE-DEFICIENCY HYPOTHYROIDISM: ICD-10-CM

## 2022-02-07 NOTE — TELEPHONE ENCOUNTER
Requested Prescriptions   Pending Prescriptions Disp Refills     letrozole (FEMARA) 2.5 MG tablet 10 tablet 0     Sig: Take 2 tablets (5 mg) by mouth daily       There is no refill protocol information for this order        Last Written Prescription Date:  2/4/22  Last Fill Quantity: 10,  # refills: 0   Last office visit: 1/21/22 virtual visit with prescribing provider:  Dr Cleveland   Future Office Visit: None      Refill denied  Pt should contact provider with update. Was some discussion regarding Clomid  Floresita Sherman RN on 2/8/2022 at 9:22 AM

## 2022-02-08 LAB
PATH REPORT.COMMENTS IMP SPEC: NORMAL
PATH REPORT.COMMENTS IMP SPEC: NORMAL
PATH REPORT.FINAL DX SPEC: NORMAL
PATH REPORT.GROSS SPEC: NORMAL
PATH REPORT.MICROSCOPIC SPEC OTHER STN: NORMAL
PATH REPORT.RELEVANT HX SPEC: NORMAL
PHOTO IMAGE: NORMAL

## 2022-02-08 RX ORDER — LETROZOLE 2.5 MG/1
5 TABLET, FILM COATED ORAL DAILY
Qty: 10 TABLET | Refills: 0 | OUTPATIENT
Start: 2022-02-08

## 2022-02-16 ENCOUNTER — OFFICE VISIT (OUTPATIENT)
Dept: OBGYN | Facility: CLINIC | Age: 37
End: 2022-02-16
Payer: COMMERCIAL

## 2022-02-16 ENCOUNTER — TRANSFERRED RECORDS (OUTPATIENT)
Dept: HEALTH INFORMATION MANAGEMENT | Facility: CLINIC | Age: 37
End: 2022-02-16

## 2022-02-16 VITALS
SYSTOLIC BLOOD PRESSURE: 112 MMHG | WEIGHT: 140 LBS | HEIGHT: 67 IN | BODY MASS INDEX: 21.97 KG/M2 | DIASTOLIC BLOOD PRESSURE: 62 MMHG

## 2022-02-16 DIAGNOSIS — E02 SUBCLINICAL IODINE-DEFICIENCY HYPOTHYROIDISM: Primary | ICD-10-CM

## 2022-02-16 DIAGNOSIS — N97.0 INFERTILITY ASSOCIATED WITH ANOVULATION: ICD-10-CM

## 2022-02-16 DIAGNOSIS — Z31.41 FERTILITY TESTING: ICD-10-CM

## 2022-02-16 PROCEDURE — 84439 ASSAY OF FREE THYROXINE: CPT | Performed by: OBSTETRICS & GYNECOLOGY

## 2022-02-16 PROCEDURE — 99213 OFFICE O/P EST LOW 20 MIN: CPT | Performed by: OBSTETRICS & GYNECOLOGY

## 2022-02-16 PROCEDURE — 84443 ASSAY THYROID STIM HORMONE: CPT | Performed by: OBSTETRICS & GYNECOLOGY

## 2022-02-16 PROCEDURE — 36415 COLL VENOUS BLD VENIPUNCTURE: CPT | Performed by: OBSTETRICS & GYNECOLOGY

## 2022-02-16 NOTE — PROGRESS NOTES
Rocio Elkins  is here for follicle study on Day # 14 of cycle.     This is cycle 2 of Femara.    She has taken Femara 5 mg Day #5-9 of this cycle.    Follicle study shows Left ovary 16.6 mm and endometrium 6.4 mm    TSH check today  Ovidrel Thursday night 9:00 pm  Timed intercourse Thursday and Saturday  Discussed SA, they are wanting to do that next month.    Spent 15 minutes charting, reviewing imaging and talking with patient.

## 2022-02-17 ENCOUNTER — MYC MEDICAL ADVICE (OUTPATIENT)
Dept: OBGYN | Facility: CLINIC | Age: 37
End: 2022-02-17
Payer: COMMERCIAL

## 2022-02-17 ENCOUNTER — NURSE TRIAGE (OUTPATIENT)
Dept: NURSING | Facility: CLINIC | Age: 37
End: 2022-02-17
Payer: COMMERCIAL

## 2022-02-17 DIAGNOSIS — N97.0 INFERTILITY ASSOCIATED WITH ANOVULATION: Primary | ICD-10-CM

## 2022-02-17 LAB
T4 FREE SERPL-MCNC: 0.93 NG/DL (ref 0.76–1.46)
TSH SERPL DL<=0.005 MIU/L-ACNC: 11.06 MU/L (ref 0.4–4)

## 2022-02-18 DIAGNOSIS — E03.9 HYPOTHYROIDISM: Primary | ICD-10-CM

## 2022-02-18 RX ORDER — CHORIOGONADOTROPIN ALFA 250 UG/.5ML
250 INJECTION, SOLUTION SUBCUTANEOUS ONCE
Qty: 0.5 ML | Refills: 0 | Status: SHIPPED | OUTPATIENT
Start: 2022-02-18 | End: 2023-01-23

## 2022-02-18 RX ORDER — LEVOTHYROXINE SODIUM 75 UG/1
75 TABLET ORAL DAILY
Qty: 30 TABLET | Refills: 0 | Status: SHIPPED | OUTPATIENT
Start: 2022-02-18 | End: 2023-01-23

## 2022-02-18 NOTE — TELEPHONE ENCOUNTER
Pt called stating she was suppose to take injection (OVIDREL) at 9 pm today but didn't pick the prescription from the pharmacy and it I closed now. Pt wants to know if it is okay prescription sent to the different pharmacy or if she can take it in the morning.         RN paged on call provider, spoke with Dr Alyssa Davenport. Provider was informed and she stated the medication is controled substance and unable to sent to the pharmacy. Provider advised pt to call clinic in the morning for direction of when to take the injection.       RN called pt  back and relayed provider advice, and she verbalized understanding.       Chaitanya Thomas, RN Nurse Advisor, 2/17/2022

## 2022-02-18 NOTE — TELEPHONE ENCOUNTER
Reason for Disposition    [1] Caller has URGENT medication question about med that PCP or specialist prescribed AND [2] triager unable to answer question    Protocols used: MEDICATION QUESTION CALL-A-AH

## 2022-02-22 ENCOUNTER — TELEPHONE (OUTPATIENT)
Dept: ENDOCRINOLOGY | Facility: CLINIC | Age: 37
End: 2022-02-22
Payer: COMMERCIAL

## 2022-02-22 NOTE — TELEPHONE ENCOUNTER
[facility] Call Center    Phone Message    May a detailed message be left on voicemail: yes     Reason for Call: Other: New-Hypothyroidism [E03.9],Referral Dr. Rodriguez, Please review no new appoinments in 2 weeks       Action Taken: Other: ENDO    Travel Screening: Not Applicable

## 2022-02-23 ENCOUNTER — OFFICE VISIT (OUTPATIENT)
Dept: OTOLARYNGOLOGY | Facility: CLINIC | Age: 37
End: 2022-02-23
Attending: INTERNAL MEDICINE
Payer: COMMERCIAL

## 2022-02-23 ENCOUNTER — PRE VISIT (OUTPATIENT)
Dept: OTOLARYNGOLOGY | Facility: CLINIC | Age: 37
End: 2022-02-23

## 2022-02-23 VITALS
HEIGHT: 67 IN | HEART RATE: 94 BPM | BODY MASS INDEX: 21.99 KG/M2 | OXYGEN SATURATION: 100 % | TEMPERATURE: 98.2 F | WEIGHT: 140.1 LBS | SYSTOLIC BLOOD PRESSURE: 109 MMHG | DIASTOLIC BLOOD PRESSURE: 71 MMHG

## 2022-02-23 DIAGNOSIS — R09.82 POST-NASAL DRIP: ICD-10-CM

## 2022-02-23 DIAGNOSIS — G50.1 ATYPICAL FACIAL PAIN: ICD-10-CM

## 2022-02-23 DIAGNOSIS — J01.01 ACUTE RECURRENT MAXILLARY SINUSITIS: Primary | ICD-10-CM

## 2022-02-23 PROCEDURE — 31231 NASAL ENDOSCOPY DX: CPT | Performed by: OTOLARYNGOLOGY

## 2022-02-23 PROCEDURE — 99203 OFFICE O/P NEW LOW 30 MIN: CPT | Mod: 25 | Performed by: OTOLARYNGOLOGY

## 2022-02-23 RX ORDER — FLUTICASONE PROPIONATE 50 MCG
1 SPRAY, SUSPENSION (ML) NASAL 2 TIMES DAILY
Qty: 16 G | Refills: 3 | Status: SHIPPED | OUTPATIENT
Start: 2022-02-23 | End: 2022-03-25

## 2022-02-23 ASSESSMENT — PAIN SCALES - GENERAL: PAINLEVEL: SEVERE PAIN (7)

## 2022-02-23 NOTE — PROGRESS NOTES
[facility]  New Patient Visit      Encounter date:   February 23, 2022    Referring Provider:   Mohini Edouard MD  [address] [place] [address]    Chief Complaint: sinusitis     History of Present Illness:   Rocio Elkins is a 36 year old female who presents for consultation regarding sinusitis. She presented to her primary care provider Dr. Silke alejandra on 12/31/21 for acute sinusitis and was started on Augmentin; this was her third treatment in the past year. She tells me that the antibiotic helped jennifer symptoms until two days ago she had return of congestion, sinus pressure headaches, ear fullness, and sore throat.      She denies any drainage or complete occlusion of her breathing. She has no history of asthma or allergic rhinitis.  During her exacerbations, facial pain seems to predominate. Nasal symptoms seem minimal.       Sino-Nasal Outcome Test (SNOT - 22)  DNT    [place] Operative History:  No sinonasal surgery    Review of systems: A 14-point review of systems has been conducted and was negative for any notable symptoms, except as dictated in the history of present illness.     Medical History:  Past Medical History:   Diagnosis Date     Diabetes (H)     Gestational diabetes diet controlled     Hyperemesis gravidarum, antepartum 10/5/2018     Thyroid disease         Surgical History:   Past Surgical History:   Procedure Laterality Date     NO HISTORY OF SURGERY          Family History:  Family History   Problem Relation Age of Onset     Diabetes Mother      Hypertension Mother         Social History:   Social History     Socioeconomic History     Marital status:      Spouse name: Not on file     Number of children: 0     Years of education: Not on file     Highest education level: Not on file   Occupational History     Employer: [employer]   Tobacco Use     Smoking status: Never Smoker     Smokeless tobacco: Never Used     Tobacco comment:  "N/A   Substance and Sexual Activity     Alcohol use: Never     Drug use: Never     Sexual activity: Yes     Partners: Male     Birth control/protection: Pull-out method, None   Other Topics Concern     Parent/sibling w/ CABG, MI or angioplasty before 65F 55M? No   Social History Narrative     Not on file     Social Determinants of Health     Financial Resource Strain: Not on file   Food Insecurity: Not on file   Transportation Needs: Not on file   Physical Activity: Not on file   Stress: Not on file   Social Connections: Not on file   Intimate Partner Violence: Not on file   Housing Stability: Not on file        Physical Exam:  Vital signs: /71 (BP Location: Left arm, Patient Position: Sitting)   Pulse 94   Temp 98.2  F (36.8  C) (Temporal)   Ht 1.702 m (5' 7\")   Wt 63.5 kg (140 lb 1.6 oz)   LMP 02/03/2022   SpO2 100%   BMI 21.94 kg/m     General Appearance: No acute distress, appropriate demeanor, conversant  Eyes: moist conjunctivae; EOMI; pupils symmetric; visual acuity grossly intact; no proptosis  Head: normocephalic; overall symmetric appearance without deformity  Face: overall symmetric without deformity; HB I/VI  Ears: Normal appearance of external ear; external meatus normal in appearance; TMs intact without perforation bilaterally; Right TM mildly retracted, auto insufflation corrected  Nose: No external deformity; see endoscopy   Oral Cavity/oropharynx: Normal appearance of mucosa; tongue midline; no mass or lesions; oropharynx without obvious mucosal abnormality; tonsils 1+  Neck: no palpable lymphadenopathy; thyroid without palpable nodules  Lungs: symmetric chest rise; no wheezing  CV: Good distal perfusion; normal hear rate  Extremities: No deformity  Neurologic Exam: Cranial nerves II-XII are grossly intact; no focal deficit      Procedure Note  Procedure performed: Rigid nasal endoscopy  Indication: To evaluate for sinonasal pathology not visualized on routine anterior " rhinoscopy  Anesthesia: 4% topical lidocaine with 0.05% oxymetazoline  Description of procedure: A 30 degree, 3 mm rigid endoscope was inserted into bilateral nasal cavities and the nasal valves, nasal cavity, middle meatus, sphenoethmoid recess, and nasopharynx were thoroughly evaluated for evidence of obstruction, edema, purulence, polyps and/or mass/lesion.     30 degree scope used to look in larynx and pharynx; both without abnormality.      Yoseph-Deshawn Endoscopic Scoring System  Endoscopic observation Right Left   Polyps in middle meatus (0 = absent, 1 = restricted to middle meatus, 2 = Beyond middle meatus) 0 0   Discharge (0 = absent, 1 = thin and clear, 2 = thick, purulent) 1 1   Edema (0 = absent, 1 = mild-moderate, 2 = moderate-severe) 1 1   Crusting (0 = absent, 1 = mild-moderate, 2 = moderate-severe) 0 0   Scarring (0= absent, 1 = mild-moderate, 2 = moderate-severe) 0 0   Total 2 2     Findings  RT: middle turbinate edema; MM and SER clear  LT: no signs of hillary infection; MM and SER clear   Nasopharynx and eustachian tubes clear    The patient tolerated the procedure well without complication.     Laboratory Review:  n/a    Imaging Review:  n/a    Pathology Review:  n/a    Assessment/Medical Decision Making:  Recurrent acute sinusitis  Otalgia poss 2/2 RT ETD - some retraction of TM on exam today  Atypical facial pain        Plan:  Her sinuses are without active infection on endoscopy but may have some smoldering sinusitis. I will have standing order for CT placed if this does ramp up.  We will have her start sinus rinses and Flonase 2x daily in the meantime. Follow up with me pending results of CT.     Torsten Moore MD    [facility]  Rhinology  Endoscopic Skull Base Surgery  [facility]  Department of Otolaryngology - Head & Neck Surgery    Scribe Disclosure:  I, Mani Velasquez, am serving as a scribe to document services personally performed by Torsten  YULISA Moore MD at this visit, based upon the provider's statements to me. All documentation has been reviewed by the aforementioned provider prior to being entered into the official medical record.

## 2022-02-23 NOTE — PATIENT INSTRUCTIONS
"1. You were seen in the clinic today by Dr. Moore. If you have any questions or concerns after your appointment, please call the clinic at [phone]. Press \"1\" for scheduling, press \"3\" for nurse advice.    2.   The following has been recommended for you based upon your appointment today:   - Start sinus rinses twice daily followed by Flonase. Use one spray twice daily after sinus rinses.   - We placed a standing CT order, please call [phone] to get scheduled when you are having a significant sinus infection.     3.   Plan to follow-up pending CT results.      Renata Wild AURELIA  [facility]  Department of Otolaryngology  [phone]      Cleaning of the Nasal or Sinus Cavity    Preparation:  1.  Wash your hands  2.  We suggest that you buy the [employer] Sinus Rinse Kit  3.  Use distilled water or tap water that has been boiled and brought to room temperature. This is important because serious infections can result from using tap water that is not clean. These infections are very rare, but it's better to be absolutely safe.  4.  Fill the irrigation bottle with room temperature water (distilled or boiled tap water) and add mixture (pre made packet or homemade recipe).        If you wish to make a homemade recipe:        Mix 1/4 teaspoon salt (kosher,non-iodine salt) with 1/4 teaspoon baking soda in each bottle.    Cleansing Irrigation/Sinus Rinse:    1.  Lean forward over a sink and insert the rinse bottle applicator into the right side of your nose. Make sure to point the applicator towards the back of your head.     2.  Tilt head down and to the left side.  With your mouth open (breathing through your mouth), gently direct the water around the inside of your nose until clear fluid starts to drain from the opposite nostril.  This is called flushing or irrigation.    3.  When you have used 1/4 to 1/2 or the solution, switch to the left nostril.    4.  To irrigate the left nostril, tilt your head down and to " the right side.  With your mouth open (breathing through your mouth),  gently direct the water around the inside of your nose until clear fluid starts to drain from the opposite nostril.     Cleaning the Equipment:  1.  Throw away any leftover solution  2.  Clean the rinse bottle and cap with clear water. Air dry.      Call the ENT Clinic if you have any questions or concerns at [phone]

## 2022-02-23 NOTE — NURSING NOTE
"Chief Complaint   Patient presents with     Consult     chronic maxillary sinusitis    Blood pressure 109/71, pulse 94, temperature 98.2  F (36.8  C), temperature source Temporal, height 1.702 m (5' 7\"), weight 63.5 kg (140 lb 1.6 oz), last menstrual period 02/03/2022, SpO2 100 %, not currently breastfeeding. Renata Wild LPN    "

## 2022-02-23 NOTE — LETTER
2/23/2022       RE: Rocio Elkins  [address]  [facility] [address]     Dear Colleague,    Thank you for referring your patient, Rocio Elkins, to the [facility] [place] at [facility]. Please see a copy of my visit note below.      [facility]  New Patient Visit      Encounter date:   February 23, 2022    Referring Provider:   Mohini Edouard MD  [address] [place] [address]    Chief Complaint: sinusitis     History of Present Illness:   Rocio Elkins is a 36 year old female who presents for consultation regarding sinusitis. She presented to her primary care provider Dr. Silke alejandra on 12/31/21 for acute sinusitis and was started on Augmentin; this was her third treatment in the past year. She tells me that the antibiotic helped jennifer symptoms until two days ago she had return of congestion, sinus pressure headaches, ear fullness, and sore throat.      She denies any drainage or complete occlusion of her breathing. She has no history of asthma or allergic rhinitis.  During her exacerbations, facial pain seems to predominate. Nasal symptoms seem minimal.       Sino-Nasal Outcome Test (SNOT - 22)  DNT    [place] [facility]:  No sinonasal surgery    Review of systems: A 14-point review of systems has been conducted and was negative for any notable symptoms, except as dictated in the history of present illness.     Medical History:  Past Medical History:   Diagnosis Date     Diabetes (H)     Gestational diabetes diet controlled     Hyperemesis gravidarum, antepartum 10/5/2018     Thyroid disease         Surgical History:   Past Surgical History:   Procedure Laterality Date     NO HISTORY OF SURGERY          Family History:  Family History   Problem Relation Age of Onset     Diabetes Mother      Hypertension Mother         Social History:   Social History  "    Socioeconomic History     Marital status:      Spouse name: Not on file     Number of children: 0     Years of education: Not on file     Highest education level: Not on file   Occupational History     Employer: [employer]   Tobacco Use     Smoking status: Never Smoker     Smokeless tobacco: Never Used     Tobacco comment: N/A   Substance and Sexual Activity     Alcohol use: Never     Drug use: Never     Sexual activity: Yes     Partners: Male     Birth control/protection: Pull-out method, None   Other Topics Concern     Parent/sibling w/ CABG, MI or angioplasty before 65F 55M? No   Social History Narrative     Not on file     Social Determinants of Health     Financial Resource Strain: Not on file   Food Insecurity: Not on file   Transportation Needs: Not on file   Physical Activity: Not on file   Stress: Not on file   Social Connections: Not on file   Intimate Partner Violence: Not on file   Housing Stability: Not on file        Physical Exam:  Vital signs: /71 (BP Location: Left arm, Patient Position: Sitting)   Pulse 94   Temp 98.2  F (36.8  C) (Temporal)   Ht 1.702 m (5' 7\")   Wt 63.5 kg (140 lb 1.6 oz)   LMP 02/03/2022   SpO2 100%   BMI 21.94 kg/m     General Appearance: No acute distress, appropriate demeanor, conversant  Eyes: moist conjunctivae; EOMI; pupils symmetric; visual acuity grossly intact; no proptosis  Head: normocephalic; overall symmetric appearance without deformity  Face: overall symmetric without deformity; HB I/VI  Ears: Normal appearance of external ear; external meatus normal in appearance; TMs intact without perforation bilaterally; Right TM mildly retracted, auto insufflation corrected  Nose: No external deformity; see endoscopy   Oral Cavity/oropharynx: Normal appearance of mucosa; tongue midline; no mass or lesions; oropharynx without obvious mucosal abnormality; tonsils 1+  Neck: no palpable lymphadenopathy; thyroid without palpable " nodules  Lungs: symmetric chest rise; no wheezing  CV: Good distal perfusion; normal hear rate  Extremities: No deformity  Neurologic Exam: Cranial nerves II-XII are grossly intact; no focal deficit      Procedure Note  Procedure performed: Rigid nasal endoscopy  Indication: To evaluate for sinonasal pathology not visualized on routine anterior rhinoscopy  Anesthesia: 4% topical lidocaine with 0.05% oxymetazoline  Description of procedure: A 30 degree, 3 mm rigid endoscope was inserted into bilateral nasal cavities and the nasal valves, nasal cavity, middle meatus, sphenoethmoid recess, and nasopharynx were thoroughly evaluated for evidence of obstruction, edema, purulence, polyps and/or mass/lesion.     30 degree scope used to look in larynx and pharynx; both without abnormality.      Yoseph-Deshawn Endoscopic Scoring System  Endoscopic observation Right Left   Polyps in middle meatus (0 = absent, 1 = restricted to middle meatus, 2 = Beyond middle meatus) 0 0   Discharge (0 = absent, 1 = thin and clear, 2 = thick, purulent) 1 1   Edema (0 = absent, 1 = mild-moderate, 2 = moderate-severe) 1 1   Crusting (0 = absent, 1 = mild-moderate, 2 = moderate-severe) 0 0   Scarring (0= absent, 1 = mild-moderate, 2 = moderate-severe) 0 0   Total 2 2     Findings  RT: middle turbinate edema; MM and SER clear  LT: no signs of hillary infection; MM and SER clear   Nasopharynx and eustachian tubes clear    The patient tolerated the procedure well without complication.     Laboratory Review:  n/a    Imaging Review:  n/a    Pathology Review:  n/a    Assessment/Medical Decision Making:  Recurrent acute sinusitis  Otalgia poss 2/2 RT ETD - some retraction of TM on exam today  Atypical facial pain        Plan:  Her sinuses are without active infection on endoscopy but may have some smoldering sinusitis. I will have standing order for CT placed if this does ramp up.  We will have her start sinus rinses and Flonase 2x daily in the meantime.  Follow up with me pending results of CT.     Torsten Moore MD    [facility]  Rhinology  Endoscopic Skull Base Surgery  [facility]  Department of Otolaryngology - Head & Neck Surgery    Scribe Disclosure:  I, Mani Velasquez, am serving as a scribe to document services personally performed by Torsten Moore MD at this visit, based upon the provider's statements to me. All documentation has been reviewed by the aforementioned provider prior to being entered into the official medical record.         Again, thank you for allowing me to participate in the care of your patient.      Sincerely,    Torsten Moore MD

## 2022-02-23 NOTE — TELEPHONE ENCOUNTER
Endocrine triage  The scheduled first available endocrine appointment timeframe is acceptable  Vannessa Tesfaye MD

## 2022-02-24 PROBLEM — J45.909 ASTHMA: Status: ACTIVE | Noted: 2022-02-24

## 2022-02-24 NOTE — TELEPHONE ENCOUNTER
RECORDS RECEIVED FROM: internal /ce   DATE RECEIVED: 4.15.22    NOTES (FOR ALL VISITS) STATUS DETAILS   OFFICE NOTES from referring provider internal  Dr. Rodriguez   OFFICE NOTES from other specialist internal  OB/GYN   ED NOTES     OPERATIVE REPORT  (thyroid, pituitary, adrenal, parathyroid)     MEDICATION LIST internal     IMAGING      DEXASCAN     MRI (BRAIN)     XR (Chest) internal /ce Internal 1.6.22  [facility]- 2.9.21    CT (HEAD/NECK/CHEST/ABDOMEN)     NUCLEAR      ULTRASOUND (HEAD/NECK)     LABS     DIABETES: HBGA1C, CREATININE, FASTING LIPIDS, MICROALBUMIN URINE, POTASSIUM, TSH, T4    THYROID: TSH, T4, CBC, THYRODLONULIN, TOTAL T3, FREE T4, CALCITONIN, CEA internal /ce

## 2022-04-15 ENCOUNTER — PRE VISIT (OUTPATIENT)
Dept: ENDOCRINOLOGY | Facility: CLINIC | Age: 37
End: 2022-04-15

## 2022-04-22 ENCOUNTER — OFFICE VISIT (OUTPATIENT)
Dept: FAMILY MEDICINE | Facility: CLINIC | Age: 37
End: 2022-04-22
Payer: COMMERCIAL

## 2022-04-22 VITALS
OXYGEN SATURATION: 100 % | TEMPERATURE: 97.4 F | BODY MASS INDEX: 21.94 KG/M2 | HEIGHT: 67 IN | SYSTOLIC BLOOD PRESSURE: 108 MMHG | HEART RATE: 57 BPM | RESPIRATION RATE: 16 BRPM | DIASTOLIC BLOOD PRESSURE: 72 MMHG

## 2022-04-22 DIAGNOSIS — R31.9 URINARY TRACT INFECTION WITH HEMATURIA, SITE UNSPECIFIED: ICD-10-CM

## 2022-04-22 DIAGNOSIS — R30.9 URINATION PAIN: Primary | ICD-10-CM

## 2022-04-22 DIAGNOSIS — N39.0 URINARY TRACT INFECTION WITH HEMATURIA, SITE UNSPECIFIED: ICD-10-CM

## 2022-04-22 LAB
ALBUMIN UR-MCNC: 30 MG/DL
APPEARANCE UR: CLEAR
BACTERIA #/AREA URNS HPF: ABNORMAL /HPF
BILIRUB UR QL STRIP: NEGATIVE
COLOR UR AUTO: YELLOW
GLUCOSE UR STRIP-MCNC: NEGATIVE MG/DL
HGB UR QL STRIP: ABNORMAL
KETONES UR STRIP-MCNC: NEGATIVE MG/DL
LEUKOCYTE ESTERASE UR QL STRIP: ABNORMAL
NITRATE UR QL: NEGATIVE
PH UR STRIP: 6 [PH] (ref 5–7)
RBC #/AREA URNS AUTO: ABNORMAL /HPF
SP GR UR STRIP: 1.01 (ref 1–1.03)
SQUAMOUS #/AREA URNS AUTO: ABNORMAL /LPF
UROBILINOGEN UR STRIP-ACNC: 0.2 E.U./DL
WBC #/AREA URNS AUTO: ABNORMAL /HPF

## 2022-04-22 PROCEDURE — 99213 OFFICE O/P EST LOW 20 MIN: CPT | Performed by: PHYSICIAN ASSISTANT

## 2022-04-22 PROCEDURE — 87086 URINE CULTURE/COLONY COUNT: CPT | Performed by: PHYSICIAN ASSISTANT

## 2022-04-22 PROCEDURE — 87186 SC STD MICRODIL/AGAR DIL: CPT | Performed by: PHYSICIAN ASSISTANT

## 2022-04-22 PROCEDURE — 81001 URINALYSIS AUTO W/SCOPE: CPT | Performed by: PHYSICIAN ASSISTANT

## 2022-04-22 RX ORDER — NITROFURANTOIN 25; 75 MG/1; MG/1
100 CAPSULE ORAL 2 TIMES DAILY
Qty: 14 CAPSULE | Refills: 0 | Status: SHIPPED | OUTPATIENT
Start: 2022-04-22 | End: 2022-04-29

## 2022-04-22 ASSESSMENT — PAIN SCALES - GENERAL: PAINLEVEL: SEVERE PAIN (7)

## 2022-04-22 NOTE — PROGRESS NOTES
Assessment & Plan     Urinary tract infection with hematuria, site unspecified  Symptoms and UA consistent with UTI.  Will treat with Macrobid x 7 days.  She will return to the clinic as needed  - nitroFURantoin macrocrystal-monohydrate (MACROBID) 100 MG capsule; Take 1 capsule (100 mg) by mouth 2 times daily for 7 days    Urination pain  - UA Macro with Reflex to Micro and Culture - lab collect  - Urine Microscopic Exam  - Urine Culture      Return in about 1 week (around 4/29/2022) for if new or worsening symptoms.    SHARAN [facility] [address] [place] is a 36 year old who presents for the following health issues     History of Present Illness       Reason for visit:  Possible UTI  Symptom onset:  Today  Symptoms include:  Burning and feel pain when I pee  Symptom intensity:  Moderate  Symptom progression:  Improving  Had these symptoms before:  No  What makes it worse:  No  What makes it better:  No    She eats 0-1 servings of fruits and vegetables daily.She consumes 1 sweetened beverage(s) daily.She exercises with enough effort to increase her heart rate 10 to 19 minutes per day.  She exercises with enough effort to increase her heart rate 3 or less days per week.   She is taking medications regularly.           Review of Systems   Constitutional, HEENT, cardiovascular, pulmonary, gi and gu systems are negative, except as otherwise noted.      Objective    There were no vitals taken for this visit.  There is no height or weight on file to calculate BMI.  Physical Exam   GENERAL: healthy, alert and no distress  HENT: ear canals and TM's normal, nose and mouth without ulcers or lesions  RESP: lungs clear to auscultation - no rales, rhonchi or wheezes  CV: regular rate and rhythm, normal S1 S2, no S3 or S4, no murmur, click or rub, no peripheral edema and peripheral pulses strong  ABDOMEN: soft, nontender, no hepatosplenomegaly, no masses and bowel sounds  normal  PSYCH: mentation appears normal, affect normal/bright    Results for orders placed or performed in visit on 04/22/22   UA Macro with Reflex to Micro and Culture - lab collect     Status: Abnormal    Specimen: Urine, Clean Catch   Result Value Ref Range    Color Urine Yellow Colorless, Straw, Light Yellow, Yellow    Appearance Urine Clear Clear    Glucose Urine Negative Negative mg/dL    Bilirubin Urine Negative Negative    Ketones Urine Negative Negative mg/dL    Specific Gravity Urine 1.015 1.003 - 1.035    Blood Urine Large (A) Negative    pH Urine 6.0 5.0 - 7.0    Protein Albumin Urine 30  (A) Negative mg/dL    Urobilinogen Urine 0.2 0.2, 1.0 E.U./dL    Nitrite Urine Negative Negative    Leukocyte Esterase Urine Moderate (A) Negative   Urine Microscopic Exam     Status: Abnormal   Result Value Ref Range    Bacteria Urine Few (A) None Seen /HPF    RBC Urine 25-50 (A) 0-2 /HPF /HPF    WBC Urine  (A) 0-5 /HPF /HPF    Squamous Epithelials Urine Few (A) None Seen /LPF

## 2022-04-23 LAB — BACTERIA UR CULT: ABNORMAL

## 2022-04-25 NOTE — RESULT ENCOUNTER NOTE
Rocio-  Here are your recent results.     Your urine culture was positive for a bacteria that is sensitive to the antibiotic that I prescribed.  Please finish the full antibiotic course and let me know if your symptoms persist.    Henrique Tsai PA-C

## 2022-06-23 ENCOUNTER — LAB (OUTPATIENT)
Dept: LAB | Facility: CLINIC | Age: 37
End: 2022-06-23
Payer: COMMERCIAL

## 2022-06-23 DIAGNOSIS — E03.9 HYPOTHYROIDISM: ICD-10-CM

## 2022-06-23 PROCEDURE — 36415 COLL VENOUS BLD VENIPUNCTURE: CPT

## 2022-06-23 PROCEDURE — 84443 ASSAY THYROID STIM HORMONE: CPT

## 2022-06-23 PROCEDURE — 84439 ASSAY OF FREE THYROXINE: CPT

## 2022-06-26 LAB
T4 FREE SERPL-MCNC: 1.34 NG/DL (ref 0.76–1.46)
TSH SERPL DL<=0.005 MIU/L-ACNC: 0.5 MU/L (ref 0.4–4)

## 2022-07-27 ENCOUNTER — OFFICE VISIT (OUTPATIENT)
Dept: FAMILY MEDICINE | Facility: CLINIC | Age: 37
End: 2022-07-27
Payer: COMMERCIAL

## 2022-07-27 VITALS
TEMPERATURE: 97.7 F | RESPIRATION RATE: 16 BRPM | OXYGEN SATURATION: 100 % | WEIGHT: 137 LBS | HEART RATE: 57 BPM | SYSTOLIC BLOOD PRESSURE: 106 MMHG | HEIGHT: 67 IN | BODY MASS INDEX: 21.5 KG/M2 | DIASTOLIC BLOOD PRESSURE: 74 MMHG

## 2022-07-27 DIAGNOSIS — Z00.00 HEALTH MAINTENANCE EXAMINATION: Primary | ICD-10-CM

## 2022-07-27 DIAGNOSIS — E03.9 HYPOTHYROIDISM, UNSPECIFIED TYPE: ICD-10-CM

## 2022-07-27 DIAGNOSIS — Z12.4 CERVICAL CANCER SCREENING: ICD-10-CM

## 2022-07-27 DIAGNOSIS — Z86.32 HISTORY OF GESTATIONAL DIABETES MELLITUS (GDM): ICD-10-CM

## 2022-07-27 PROBLEM — J45.909 ASTHMA: Status: RESOLVED | Noted: 2022-02-24 | Resolved: 2022-07-27

## 2022-07-27 LAB
ALBUMIN SERPL-MCNC: 3.9 G/DL (ref 3.4–5)
ALP SERPL-CCNC: 55 U/L (ref 40–150)
ALT SERPL W P-5'-P-CCNC: 17 U/L (ref 0–50)
ANION GAP SERPL CALCULATED.3IONS-SCNC: 5 MMOL/L (ref 3–14)
AST SERPL W P-5'-P-CCNC: 13 U/L (ref 0–45)
BILIRUB SERPL-MCNC: 0.4 MG/DL (ref 0.2–1.3)
BUN SERPL-MCNC: 9 MG/DL (ref 7–30)
CALCIUM SERPL-MCNC: 8.8 MG/DL (ref 8.5–10.1)
CHLORIDE BLD-SCNC: 110 MMOL/L (ref 94–109)
CHOLEST SERPL-MCNC: 145 MG/DL
CO2 SERPL-SCNC: 23 MMOL/L (ref 20–32)
CREAT SERPL-MCNC: 0.62 MG/DL (ref 0.52–1.04)
ERYTHROCYTE [DISTWIDTH] IN BLOOD BY AUTOMATED COUNT: 13.6 % (ref 10–15)
FASTING STATUS PATIENT QL REPORTED: YES
GFR SERPL CREATININE-BSD FRML MDRD: >90 ML/MIN/1.73M2
GLUCOSE BLD-MCNC: 94 MG/DL (ref 70–99)
HBA1C MFR BLD: 5.4 % (ref 0–5.6)
HCT VFR BLD AUTO: 38.9 % (ref 35–47)
HDLC SERPL-MCNC: 47 MG/DL
HGB BLD-MCNC: 13.2 G/DL (ref 11.7–15.7)
LDLC SERPL CALC-MCNC: 87 MG/DL
MCH RBC QN AUTO: 28.7 PG (ref 26.5–33)
MCHC RBC AUTO-ENTMCNC: 33.9 G/DL (ref 31.5–36.5)
MCV RBC AUTO: 85 FL (ref 78–100)
NONHDLC SERPL-MCNC: 98 MG/DL
PLATELET # BLD AUTO: 197 10E3/UL (ref 150–450)
POTASSIUM BLD-SCNC: 3.9 MMOL/L (ref 3.4–5.3)
PROT SERPL-MCNC: 7.1 G/DL (ref 6.8–8.8)
RBC # BLD AUTO: 4.6 10E6/UL (ref 3.8–5.2)
SODIUM SERPL-SCNC: 138 MMOL/L (ref 133–144)
TRIGL SERPL-MCNC: 55 MG/DL
WBC # BLD AUTO: 6 10E3/UL (ref 4–11)

## 2022-07-27 PROCEDURE — 99395 PREV VISIT EST AGE 18-39: CPT | Performed by: FAMILY MEDICINE

## 2022-07-27 PROCEDURE — 80053 COMPREHEN METABOLIC PANEL: CPT | Performed by: FAMILY MEDICINE

## 2022-07-27 PROCEDURE — 83036 HEMOGLOBIN GLYCOSYLATED A1C: CPT | Performed by: FAMILY MEDICINE

## 2022-07-27 PROCEDURE — 85027 COMPLETE CBC AUTOMATED: CPT | Performed by: FAMILY MEDICINE

## 2022-07-27 PROCEDURE — 36415 COLL VENOUS BLD VENIPUNCTURE: CPT | Performed by: FAMILY MEDICINE

## 2022-07-27 PROCEDURE — 80061 LIPID PANEL: CPT | Performed by: FAMILY MEDICINE

## 2022-07-27 ASSESSMENT — ENCOUNTER SYMPTOMS
DIARRHEA: 0
HEMATOCHEZIA: 0
PARESTHESIAS: 0
JOINT SWELLING: 0
COUGH: 0
HEMATURIA: 0
WEAKNESS: 0
SORE THROAT: 0
MYALGIAS: 0
PALPITATIONS: 0
FREQUENCY: 0
HEADACHES: 0
DYSURIA: 0
NAUSEA: 0
DIZZINESS: 0
CHILLS: 0
SHORTNESS OF BREATH: 0
ARTHRALGIAS: 0
FEVER: 0
CONSTIPATION: 0
HEARTBURN: 0
ABDOMINAL PAIN: 0
EYE PAIN: 0
NERVOUS/ANXIOUS: 0

## 2022-07-27 ASSESSMENT — PAIN SCALES - GENERAL: PAINLEVEL: NO PAIN (0)

## 2022-07-27 NOTE — PROGRESS NOTES
SUBJECTIVE:   CC: Rocio Elkins is an 36 year old woman who presents for preventive health visit.       Patient has been advised of split billing requirements and indicates understanding: Yes  Healthy Habits:     Getting at least 3 servings of Calcium per day:  NO    Bi-annual eye exam:  NO    Dental care twice a year:  NO    Sleep apnea or symptoms of sleep apnea:  None    Diet:  Regular (no restrictions)    Frequency of exercise:  None    Taking medications regularly:  Yes    Medication side effects:  None    PHQ-2 Total Score: 0    Additional concerns today:  No    Patient states no asthma; please remove from HM list.         Today's PHQ-2 Score:   PHQ-2 ( 1999 Pfizer) 7/27/2022   Q1: Little interest or pleasure in doing things 0   Q2: Feeling down, depressed or hopeless 0   PHQ-2 Score 0   PHQ-2 Total Score (12-17 Years)- Positive if 3 or more points; Administer PHQ-A if positive -   Q1: Little interest or pleasure in doing things Not at all   Q2: Feeling down, depressed or hopeless Not at all   PHQ-2 Score 0       Abuse: Current or Past (Physical, Sexual or Emotional) - No  Do you feel safe in your environment? Yes    Have you ever done Advance Care Planning? (For example, a Health Directive, POLST, or a discussion with a medical provider or your loved ones about your wishes): No, advance care planning information given to patient to review.  Patient declined advance care planning discussion at this time.    Social History     Tobacco Use     Smoking status: Never Smoker     Smokeless tobacco: Never Used     Tobacco comment: N/A   Substance Use Topics     Alcohol use: Never     If you drink alcohol do you typically have >3 drinks per day or >7 drinks per week? No    Alcohol Use 7/27/2022   Prescreen: >3 drinks/day or >7 drinks/week? Not Applicable   No flowsheet data found.    Reviewed orders with patient.  Reviewed health maintenance and updated orders accordingly - Yes  BP Readings from Last 3  Encounters:   07/27/22 106/74   04/22/22 108/72   02/23/22 109/71    Wt Readings from Last 3 Encounters:   07/27/22 62.1 kg (137 lb)   02/23/22 63.5 kg (140 lb 1.6 oz)   02/16/22 63.5 kg (140 lb)                  Patient Active Problem List   Diagnosis     LTBI (latent tuberculosis infection)     Postablative hypothyroidism     Thyrotoxic exophthalmos     Hypovitaminosis D     History of diet controlled gestational diabetes mellitus (GDM)     Lactating mother     History of third degree perineal laceration     History of postpartum hemorrhage     Helicobacter pylori infection     Past Surgical History:   Procedure Laterality Date     NO HISTORY OF SURGERY         Social History     Tobacco Use     Smoking status: Never Smoker     Smokeless tobacco: Never Used     Tobacco comment: N/A   Substance Use Topics     Alcohol use: Never     Family History   Problem Relation Age of Onset     Diabetes Mother      Hypertension Mother          Current Outpatient Medications   Medication Sig Dispense Refill     dicyclomine (BENTYL) 10 MG capsule Take 1 capsule (10 mg) by mouth 4 times daily as needed (abdominal cramping) 20 capsule 1     famotidine (PEPCID) 20 MG tablet Take 1 tablet (20 mg) by mouth 2 times daily 60 tablet 1     letrozole (FEMARA) 2.5 MG tablet Take 2 tablets (5 mg) by mouth daily 10 tablet 0     levothyroxine (SYNTHROID/LEVOTHROID) 50 MCG tablet Take 1 tablet (50 mcg) by mouth daily 30 tablet 4     levothyroxine (SYNTHROID/LEVOTHROID) 75 MCG tablet Take 1 tablet (75 mcg) by mouth daily 30 tablet 0     loperamide (IMODIUM A-D) 2 MG tablet Take 1 tablet (2 mg) by mouth 3 times daily as needed for diarrhea 15 tablet 0     choriogonadotropin rene (OVIDREL) 250 MCG/0.5ML injection Inject 0.5 mLs (250 mcg) Subcutaneous once for 1 dose 0.5 mL 0     choriogonadotropin rene (OVIDREL) 250 MCG/0.5ML injection Inject 0.5 mLs (250 mcg) Subcutaneous once for 1 dose 0.5 mL 0     Allergies   Allergen Reactions      Pseudoephedrine Other (See Comments)     Dizziness, leg weakness  Other reaction(s): Other, see comments  Dizziness, leg weakness     Recent Labs   Lab Test 06/23/22  1242 02/16/22  1559 12/15/21  1307 11/09/21  1636 02/17/21  1254 12/21/20  0840 12/06/18  1521 10/04/18  1430 09/25/18  1300   A1C  --   --   --  5.4  --   --   --  5.4  --    LDL  --   --   --   --   --  94  --   --   --    HDL  --   --   --   --   --  41*  --   --   --    TRIG  --   --   --   --   --  66  --   --   --    ALT  --   --   --   --   --  22  --   --   --    CR  --   --   --   --   --  0.64  --   --  0.62   GFRESTIMATED  --   --   --   --   --  >90  --   --  >90   GFRESTBLACK  --   --   --   --   --  >90  --   --  >90   POTASSIUM  --   --   --   --   --  3.9  --   --  3.6   TSH 0.50 11.06*   < > 3.33   < >  --    < > 15.71*  --     < > = values in this interval not displayed.        Breast Cancer Screening:    Breast CA Risk Assessment (FHS-7) 7/27/2022   Do you have a family history of breast, colon, or ovarian cancer? No / Unknown       click delete button to remove this line now  Patient under 40 years of age: Routine Mammogram Screening not recommended.   Pertinent mammograms are reviewed under the imaging tab.    History of abnormal Pap smear: NO - age 30-65 PAP every 5 years with negative HPV co-testing recommended     Reviewed and updated as needed this visit by clinical staff                    Reviewed and updated as needed this visit by Provider                   Past Medical History:   Diagnosis Date     Asthma 2/24/2022     Diabetes (H)     Gestational diabetes diet controlled     Hyperemesis gravidarum, antepartum 10/5/2018     Thyroid disease       Past Surgical History:   Procedure Laterality Date     NO HISTORY OF SURGERY         Review of Systems   Constitutional: Negative for chills and fever.   HENT: Negative for congestion, ear pain, hearing loss and sore throat.    Eyes: Negative for pain and visual disturbance.  "  Respiratory: Negative for cough and shortness of breath.    Cardiovascular: Positive for peripheral edema. Negative for chest pain and palpitations.   Gastrointestinal: Negative for abdominal pain, constipation, diarrhea, heartburn, hematochezia and nausea.   Genitourinary: Negative for dysuria, frequency, genital sores, hematuria and urgency.   Musculoskeletal: Negative for arthralgias, joint swelling and myalgias.   Skin: Negative for rash.   Neurological: Negative for dizziness, weakness, headaches and paresthesias.   Psychiatric/Behavioral: Negative for mood changes. The patient is not nervous/anxious.           OBJECTIVE:   /74   Pulse 57   Temp 97.7  F (36.5  C) (Temporal)   Resp 16   Ht 1.702 m (5' 7\")   Wt 62.1 kg (137 lb)   SpO2 100%   BMI 21.46 kg/m    Physical Exam  GENERAL: healthy, alert and no distress  EYES: Eyes grossly normal to inspection, PERRL and conjunctivae and sclerae normal  HENT: ear canals and TM's normal, nose and mouth without ulcers or lesions  NECK: no adenopathy, no asymmetry, masses, or scars and thyroid normal to palpation  RESP: lungs clear to auscultation - no rales, rhonchi or wheezes  CV: regular rate and rhythm, normal S1 S2, no S3 or S4, no murmur, click or rub, no peripheral edema and peripheral pulses strong  ABDOMEN: soft, nontender, no hepatosplenomegaly, no masses and bowel sounds normal  MS: no gross musculoskeletal defects noted, no edema  SKIN: no suspicious lesions or rashes  NEURO: Normal strength and tone, mentation intact and speech normal  BACK: no CVA tenderness, no paralumbar tenderness  PSYCH: mentation appears normal, affect normal/bright  LYMPH: no cervical, supraclavicular, axillary, or inguinal adenopathy        ASSESSMENT/PLAN:       ICD-10-CM    1. Health maintenance examination  Z00.00 CBC with platelets     Comprehensive metabolic panel (BMP + Alb, Alk Phos, ALT, AST, Total. Bili, TP)     Lipid panel reflex to direct LDL Fasting     " "Hemoglobin A1c     CANCELED: TSH with free T4 reflex   2. Cervical cancer screening  Z12.4    3. Hypothyroidism, unspecified type  E03.9 CANCELED: TSH with free T4 reflex   4. History of gestational diabetes mellitus (GDM)  Z86.32 Hemoglobin A1c       Patient has been advised of split billing requirements and indicates understanding: Yes    COUNSELING:  Reviewed preventive health counseling, as reflected in patient instructions    Estimated body mass index is 21.46 kg/m  as calculated from the following:    Height as of this encounter: 1.702 m (5' 7\").    Weight as of this encounter: 62.1 kg (137 lb).        She reports that she has never smoked. She has never used smokeless tobacco.      Counseling Resources:  ATP IV Guidelines  Pooled Cohorts Equation Calculator  Breast Cancer Risk Calculator  BRCA-Related Cancer Risk Assessment: FHS-7 Tool  FRAX Risk Assessment  ICSI Preventive Guidelines  Dietary Guidelines for Americans, 2010  USDA's MyPlate  ASA Prophylaxis  Lung CA Screening    Viet Benavidez MD  [facility]  "

## 2022-09-17 ENCOUNTER — HEALTH MAINTENANCE LETTER (OUTPATIENT)
Age: 37
End: 2022-09-17

## 2023-01-23 ENCOUNTER — OFFICE VISIT (OUTPATIENT)
Dept: FAMILY MEDICINE | Facility: CLINIC | Age: 38
End: 2023-01-23
Payer: COMMERCIAL

## 2023-01-23 VITALS
WEIGHT: 145.4 LBS | TEMPERATURE: 97.9 F | DIASTOLIC BLOOD PRESSURE: 79 MMHG | HEIGHT: 67 IN | SYSTOLIC BLOOD PRESSURE: 119 MMHG | HEART RATE: 92 BPM | BODY MASS INDEX: 22.82 KG/M2 | OXYGEN SATURATION: 100 % | RESPIRATION RATE: 16 BRPM

## 2023-01-23 DIAGNOSIS — R19.7 DIARRHEA, UNSPECIFIED TYPE: ICD-10-CM

## 2023-01-23 DIAGNOSIS — E89.0 POSTABLATIVE HYPOTHYROIDISM: ICD-10-CM

## 2023-01-23 DIAGNOSIS — E55.9 HYPOVITAMINOSIS D: Primary | ICD-10-CM

## 2023-01-23 DIAGNOSIS — R11.0 NAUSEA: ICD-10-CM

## 2023-01-23 LAB
DEPRECATED CALCIDIOL+CALCIFEROL SERPL-MC: 19 UG/L (ref 20–75)
TSH SERPL DL<=0.005 MIU/L-ACNC: 2.96 MU/L (ref 0.4–4)

## 2023-01-23 PROCEDURE — 36415 COLL VENOUS BLD VENIPUNCTURE: CPT | Performed by: PHYSICIAN ASSISTANT

## 2023-01-23 PROCEDURE — 99214 OFFICE O/P EST MOD 30 MIN: CPT | Performed by: PHYSICIAN ASSISTANT

## 2023-01-23 PROCEDURE — 84443 ASSAY THYROID STIM HORMONE: CPT | Performed by: PHYSICIAN ASSISTANT

## 2023-01-23 PROCEDURE — 82306 VITAMIN D 25 HYDROXY: CPT | Performed by: PHYSICIAN ASSISTANT

## 2023-01-23 RX ORDER — LEVOTHYROXINE SODIUM 88 UG/1
88 TABLET ORAL DAILY
COMMUNITY
End: 2023-02-03

## 2023-01-23 RX ORDER — ONDANSETRON 4 MG/1
4 TABLET, ORALLY DISINTEGRATING ORAL EVERY 8 HOURS PRN
Qty: 20 TABLET | Refills: 0 | Status: SHIPPED | OUTPATIENT
Start: 2023-01-23 | End: 2023-07-28

## 2023-01-23 ASSESSMENT — PAIN SCALES - GENERAL: PAINLEVEL: MODERATE PAIN (5)

## 2023-01-23 ASSESSMENT — ENCOUNTER SYMPTOMS: DIARRHEA: 1

## 2023-01-23 NOTE — PROGRESS NOTES
Assessment & Plan     1. Hypovitaminosis D    2. Postablative hypothyroidism    3. Nausea    4. Diarrhea, unspecified type      Patient requesting a follow up on her vitamin D status as she has not been supplementing and her thyroid levels.   She ha suspected gastroenteritis at this time. zofran given for nausea. Patient to increase fluids and rest. Monitor diarrhea at this time. Follow up if not improving.                  No follow-ups on file.    SHARAN [facility] SILVESTRE Jennings is a 37 year old accompanied by her daughter, presenting for the following health issues:  Diarrhea (Yesterday had diarrhea and feeling nauseous )      Diarrhea    History of Present Illness       Reason for visit:  Nausa, tiredness and diarya  Symptom onset:  1-3 days ago  Symptoms include:  Nasua  Symptom intensity:  Moderate  Symptom progression:  Worsening  Had these symptoms before:  No  What makes it worse:  No    She eats 0-1 servings of fruits and vegetables daily.She consumes 0 sweetened beverage(s) daily.She exercises with enough effort to increase her heart rate 9 or less minutes per day.  She exercises with enough effort to increase her heart rate 3 or less days per week.   She is taking medications regularly.       Diarrhea  Onset/Duration: Started yesterday   Description:       Consistency of stool: loose       Blood in stool: No       Number of loose stools past 24 hours: 3  Progression of Symptoms: None   Accompanying signs and symptoms:       Fever: No       Nausea/Vomiting: YES       Abdominal pain: Sore        Weight loss: No       Episodes of constipation: No  History   Ill contacts: Went to a party during the weekend there were kids who sick on Friday   Recent use of antibiotics: No  Recent travels: No  Recent medication-new or changes(Rx or OTC): No  Precipitating or alleviating factors: None  Therapies tried and outcome: None    The whole week she was tired.   She has a  "history of vitamin D deficiency.   No vomiting. No blood in the stooll  Nausea.   Nothing to eat or drink today.   Has been urinating ok.     Kids were sick from the cake, otherwise ok. No illness, daughter just threw up this morning.   Has not taken a COVID test.   No fever.   Denies pregnancy      Review of Systems   Gastrointestinal: Positive for diarrhea.            Objective    /79 (BP Location: Right arm, Patient Position: Sitting, Cuff Size: Adult Regular)   Pulse 92   Temp 97.9  F (36.6  C) (Oral)   Resp 16   Ht 1.702 m (5' 7\")   Wt 66 kg (145 lb 6.4 oz)   LMP 01/09/2023   SpO2 100%   BMI 22.77 kg/m    Body mass index is 22.77 kg/m .  Physical Exam   GENERAL: healthy, alert and no distress  HENT: ear canals and TM's normal, nose and mouth without ulcers or lesions  NECK: no adenopathy, no asymmetry, masses, or scars and thyroid normal to palpation  RESP: lungs clear to auscultation - no rales, rhonchi or wheezes  CV: regular rate and rhythm, normal S1 S2, no S3 or S4, no murmur, c  ABDOMEN: soft, nontender, no hepatosplenomegaly, no masses and bowel sounds normal                    "

## 2023-01-23 NOTE — LETTER
January 23, 2023      Rocio Elkins  [address] [address]  [place] [address]        To Whom It May Concern:    Rocio Elkins was seen in our clinic due to illness. She may return to work on 1/27/23 without restrictions.      Sincerely,        Brielle Powell PA-C

## 2023-01-23 NOTE — RESULT ENCOUNTER NOTE
Dikc,     Your thyroid labs are normal. Your vitamin D is low. I would recommend 2000 international unit(s) daily of over the counter vitamin D3.   Brielle Powell PA-C    Nasal mucosa clear.  Mouth with normal mucosa  Throat has no vesicles, no oropharyngeal exudates and uvula is midline.

## 2023-02-03 ENCOUNTER — MYC REFILL (OUTPATIENT)
Dept: FAMILY MEDICINE | Facility: CLINIC | Age: 38
End: 2023-02-03
Payer: COMMERCIAL

## 2023-02-03 DIAGNOSIS — E55.9 HYPOVITAMINOSIS D: Primary | ICD-10-CM

## 2023-02-03 DIAGNOSIS — E03.9 HYPOTHYROIDISM, UNSPECIFIED TYPE: ICD-10-CM

## 2023-02-03 RX ORDER — LEVOTHYROXINE SODIUM 88 UG/1
88 TABLET ORAL DAILY
Qty: 90 TABLET | Refills: 0 | Status: SHIPPED | OUTPATIENT
Start: 2023-02-03 | End: 2023-05-03

## 2023-06-27 ENCOUNTER — PATIENT OUTREACH (OUTPATIENT)
Dept: CARE COORDINATION | Facility: CLINIC | Age: 38
End: 2023-06-27
Payer: COMMERCIAL

## 2023-07-11 ENCOUNTER — PATIENT OUTREACH (OUTPATIENT)
Dept: CARE COORDINATION | Facility: CLINIC | Age: 38
End: 2023-07-11
Payer: COMMERCIAL

## 2023-07-28 ENCOUNTER — VIRTUAL VISIT (OUTPATIENT)
Dept: FAMILY MEDICINE | Facility: CLINIC | Age: 38
End: 2023-07-28
Payer: COMMERCIAL

## 2023-07-28 DIAGNOSIS — H10.33 ACUTE CONJUNCTIVITIS OF BOTH EYES, UNSPECIFIED ACUTE CONJUNCTIVITIS TYPE: Primary | ICD-10-CM

## 2023-07-28 PROCEDURE — 99213 OFFICE O/P EST LOW 20 MIN: CPT | Mod: VID | Performed by: STUDENT IN AN ORGANIZED HEALTH CARE EDUCATION/TRAINING PROGRAM

## 2023-07-28 RX ORDER — POLYMYXIN B SULFATE AND TRIMETHOPRIM 1; 10000 MG/ML; [USP'U]/ML
SOLUTION OPHTHALMIC
Qty: 10 ML | Refills: 0 | Status: SHIPPED | OUTPATIENT
Start: 2023-07-28 | End: 2023-08-04

## 2023-07-28 NOTE — PATIENT INSTRUCTIONS
Conjunctivitis, Nonspecific    The membrane that covers the white part of your eye (the conjunctiva) is inflamed. Inflammation happens when your body responds to an injury, allergic reaction, infection, or illness. Symptoms of inflammation in the eye may include redness, irritation, itching, swelling, or burning. These symptoms should go away within the next 24 hours. Conjunctivitis may be related to a particle that was in your eye. If so, it may wash out with your tears or irrigation treatment. Being exposed to liquid chemicals or fumes may also cause this reaction.    Home care  Put a cold pack on the eye for 20 minutes at a time. This will reduce pain. To make a cold pack, put ice cubes in a plastic bag that seals at the top. Wrap the bag in a clean, thin towel or cloth.  Artificial tears may be prescribed to reduce irritation or redness. These should be used 3 to 4 times a day.  You may use acetaminophen or ibuprofen to control pain, unless another medicine was prescribed. If you have chronic liver or kidney disease, talk with your healthcare provider before using these medicines. Also talk with your provider if you have ever had a stomach ulcer or gastrointestinal bleeding.  If you wear contact lenses, don't use them until your healthcare provider says it's OK.    Follow-up care  Follow up with your healthcare provider, or as advised.   When to seek medical advice  Call your healthcare provider right away if any of the following occur:   Eyelid swells more  Eye pain gets worse  Redness or drainage from the eye gets worse  Blurry vision gets worse, or you have increased sensitivity to light  Normal vision does not return within 24 to 48 hours  [employer] last reviewed this educational content on 6/1/2022 2000-2022 [employer]. All rights reserved. This information is not intended as a substitute for professional medical care. Always follow your healthcare professional's instructions.

## 2023-07-28 NOTE — PROGRESS NOTES
Dick is a 37 year old who is being evaluated via a billable video visit.      How would you like to obtain your AVS? MyChart  If the video visit is dropped, the invitation should be resent by: Text to cell phone: [phone]  Will anyone else be joining your video visit? No          Assessment & Plan     1. Acute conjunctivitis of both eyes, unspecified acute conjunctivitis type    - trimethoprim-polymyxin b (POLYTRIM) 25656-4.1 UNIT/ML-% ophthalmic solution; 1 drop in affected eye(s) every 3 hrs while awake for 5-7 days until resolved - max 6 doses per day  Dispense: 10 mL; Refill: 0  - ketotifen (ZADITOR) 0.025 % ophthalmic solution; 1 drop in affected eye(s) 2 times a day  Dispense: 5 mL; Refill: 3      Prescription drug management        Jen Murphy MD  [facility] ROSA ISELA Jennings is a 37 year old, presenting for the following health issues:  Conjunctivitis      7/28/2023     9:39 AM   Additional Questions   Roomed by Puneet MOORE   Accompanied by NA         7/28/2023     9:39 AM   Patient Reported Additional Medications   Patient reports taking the following new medications NA       Conjunctivitis         Eye(s) Problem  Onset/Duration: 4 days  Description: Pt complains of watery, burning sensation  Location: Bilateral  Pain: No  Redness: YES  Accompanying Signs & Symptoms:  Discharge/mattering: No  Swelling: No  Visual changes: No    Fever: No  Nasal Congestion: YES  Bothered by bright lights: YES  History:  Trauma: No  Foreign body exposure: No  Wearing contacts: No  Precipitating or alleviating factors: None  Therapies tried and outcome: similasan eye drops OTC, with minimal improvement            Review of Systems   Constitutional, HEENT, cardiovascular, pulmonary, gi and gu systems are negative, except as otherwise noted.      Objective           Vitals:  No vitals were obtained today due to virtual visit.    Physical Exam   GENERAL: Healthy, alert and no distress  EYES:  watery discharge and erythema of the conjunctival/sclera  PSYCH: Mentation appears normal, affect normal/bright, judgement and insight intact, normal speech and appearance well-groomed.            Video-Visit Details    Type of service:  Video Visit     Originating Location (pt. Location): Home    Distant Location (provider location):  On-site  Platform used for Video Visit: Naya

## 2023-08-23 DIAGNOSIS — E03.9 HYPOTHYROIDISM, UNSPECIFIED TYPE: ICD-10-CM

## 2023-08-24 RX ORDER — LEVOTHYROXINE SODIUM 88 UG/1
TABLET ORAL
Qty: 90 TABLET | Refills: 0 | Status: SHIPPED | OUTPATIENT
Start: 2023-08-24 | End: 2023-12-22

## 2023-09-19 ASSESSMENT — ENCOUNTER SYMPTOMS
HEMATOCHEZIA: 0
NERVOUS/ANXIOUS: 0
EYE PAIN: 0
DIZZINESS: 0
WEAKNESS: 0
HEMATURIA: 0
COUGH: 0
HEARTBURN: 1
SORE THROAT: 0
PALPITATIONS: 0
JOINT SWELLING: 0
PARESTHESIAS: 0
DYSURIA: 0
FREQUENCY: 0
DIARRHEA: 0
ABDOMINAL PAIN: 0
FEVER: 0
ARTHRALGIAS: 0
BREAST MASS: 0
CONSTIPATION: 0
MYALGIAS: 0
CHILLS: 0
HEADACHES: 0
NAUSEA: 0
SHORTNESS OF BREATH: 0

## 2023-09-26 ENCOUNTER — OFFICE VISIT (OUTPATIENT)
Dept: FAMILY MEDICINE | Facility: CLINIC | Age: 38
End: 2023-09-26
Payer: COMMERCIAL

## 2023-09-26 VITALS
RESPIRATION RATE: 16 BRPM | WEIGHT: 143 LBS | SYSTOLIC BLOOD PRESSURE: 102 MMHG | OXYGEN SATURATION: 100 % | HEIGHT: 67 IN | HEART RATE: 68 BPM | TEMPERATURE: 97.1 F | DIASTOLIC BLOOD PRESSURE: 69 MMHG | BODY MASS INDEX: 22.44 KG/M2

## 2023-09-26 DIAGNOSIS — Z00.00 ROUTINE GENERAL MEDICAL EXAMINATION AT A HEALTH CARE FACILITY: Primary | ICD-10-CM

## 2023-09-26 DIAGNOSIS — E89.0 POSTABLATIVE HYPOTHYROIDISM: ICD-10-CM

## 2023-09-26 DIAGNOSIS — E55.9 HYPOVITAMINOSIS D: ICD-10-CM

## 2023-09-26 LAB
ALBUMIN SERPL BCG-MCNC: 4.2 G/DL (ref 3.5–5.2)
ALP SERPL-CCNC: 61 U/L (ref 35–104)
ALT SERPL W P-5'-P-CCNC: 11 U/L (ref 0–50)
ANION GAP SERPL CALCULATED.3IONS-SCNC: 10 MMOL/L (ref 7–15)
AST SERPL W P-5'-P-CCNC: 19 U/L (ref 0–45)
BILIRUB SERPL-MCNC: 0.4 MG/DL
BUN SERPL-MCNC: 9.2 MG/DL (ref 6–20)
CALCIUM SERPL-MCNC: 8.7 MG/DL (ref 8.6–10)
CHLORIDE SERPL-SCNC: 104 MMOL/L (ref 98–107)
CHOLEST SERPL-MCNC: 166 MG/DL
CREAT SERPL-MCNC: 0.63 MG/DL (ref 0.51–0.95)
DEPRECATED CALCIDIOL+CALCIFEROL SERPL-MC: 29 UG/L (ref 20–75)
DEPRECATED HCO3 PLAS-SCNC: 23 MMOL/L (ref 22–29)
EGFRCR SERPLBLD CKD-EPI 2021: >90 ML/MIN/1.73M2
GLUCOSE SERPL-MCNC: 88 MG/DL (ref 70–99)
HBA1C MFR BLD: 5.4 % (ref 0–5.6)
HDLC SERPL-MCNC: 55 MG/DL
LDLC SERPL CALC-MCNC: 94 MG/DL
NONHDLC SERPL-MCNC: 111 MG/DL
POTASSIUM SERPL-SCNC: 4.3 MMOL/L (ref 3.4–5.3)
PROT SERPL-MCNC: 7.3 G/DL (ref 6.4–8.3)
SODIUM SERPL-SCNC: 137 MMOL/L (ref 135–145)
T4 FREE SERPL-MCNC: 1.32 NG/DL (ref 0.9–1.7)
TRIGL SERPL-MCNC: 84 MG/DL
TSH SERPL DL<=0.005 MIU/L-ACNC: 5.93 UIU/ML (ref 0.3–4.2)

## 2023-09-26 PROCEDURE — 36415 COLL VENOUS BLD VENIPUNCTURE: CPT | Performed by: FAMILY MEDICINE

## 2023-09-26 PROCEDURE — 82306 VITAMIN D 25 HYDROXY: CPT | Performed by: FAMILY MEDICINE

## 2023-09-26 PROCEDURE — 80061 LIPID PANEL: CPT | Performed by: FAMILY MEDICINE

## 2023-09-26 PROCEDURE — 80053 COMPREHEN METABOLIC PANEL: CPT | Performed by: FAMILY MEDICINE

## 2023-09-26 PROCEDURE — 84443 ASSAY THYROID STIM HORMONE: CPT | Performed by: FAMILY MEDICINE

## 2023-09-26 PROCEDURE — 83036 HEMOGLOBIN GLYCOSYLATED A1C: CPT | Performed by: FAMILY MEDICINE

## 2023-09-26 PROCEDURE — 84439 ASSAY OF FREE THYROXINE: CPT | Performed by: FAMILY MEDICINE

## 2023-09-26 PROCEDURE — 99395 PREV VISIT EST AGE 18-39: CPT | Performed by: FAMILY MEDICINE

## 2023-09-26 ASSESSMENT — ENCOUNTER SYMPTOMS
PALPITATIONS: 0
SHORTNESS OF BREATH: 0
ARTHRALGIAS: 0
EYE PAIN: 0
PARESTHESIAS: 0
NAUSEA: 0
CONSTIPATION: 0
HEARTBURN: 1
COUGH: 0
ABDOMINAL PAIN: 0
NERVOUS/ANXIOUS: 0
HEMATURIA: 0
FREQUENCY: 0
FEVER: 0
MYALGIAS: 0
CHILLS: 0
DIARRHEA: 0
SORE THROAT: 0
DYSURIA: 0
BREAST MASS: 0
JOINT SWELLING: 0
HEADACHES: 0
DIZZINESS: 0
WEAKNESS: 0
HEMATOCHEZIA: 0

## 2023-09-26 ASSESSMENT — PAIN SCALES - GENERAL: PAINLEVEL: NO PAIN (0)

## 2023-09-26 NOTE — PROGRESS NOTES
SUBJECTIVE:   CC: Dick is an 37 year old who presents for preventive health visit.       9/26/2023     9:44 AM   Additional Questions   Roomed by Paris   Accompanied by Self       Healthy Habits:     Getting at least 3 servings of Calcium per day:  Yes    Bi-annual eye exam:  NO    Dental care twice a year:  Yes    Sleep apnea or symptoms of sleep apnea:  None    Diet:  Regular (no restrictions)    Frequency of exercise:  2-3 days/week    Duration of exercise:  30-45 minutes    Taking medications regularly:  Yes    Medication side effects:  None    Additional concerns today:  No    Preventive -     Immunization History   Administered Date(s) Administered    COVID-19 MONOVALENT 12+ (Pfizer) 01/14/2021, 02/04/2021    Hepatitis A (ADULT 19+) 06/09/2009, 06/30/2010    Hepatitis B, Adult 02/24/2004, 03/24/2004, 07/01/2004    Influenza (IIV3) PF 10/10/2006    Influenza Vaccine >6 months (Alfuria,Fluzone) 10/22/2021    MMR 07/03/2003, 02/23/2004, 03/23/2004    Meningococcal ACWY (Menactra ) 06/09/2009    Meningococcal ACWY (Menveo ) 05/19/2017    Poliovirus, inactivated (IPV) 06/09/2009    TD,PF 7+ (Tenivac) 07/03/2003, 02/23/2004, 08/24/2004, 03/29/2005    TDAP Vaccine (Adacel) 06/09/2009, 04/08/2015, 04/22/2019    Td (Adult), Adsorbed 07/03/2003, 02/23/2004, 08/24/2004    Typhoid IM 06/09/2009, 04/08/2015, 05/19/2017, 04/19/2021    Yellow Fever 06/12/2009       -Mammogram: not indicated       -Contraception: no     -PAP: prefers female provider   She will schedule another appointment     No results found for: PAP      - Colon CA screen: Colonoscopy, age 45-75 every 10 years or FIT every year or Cologuard every 3 years   No family hx colon cancer       -lipids screen: ordered     Diabetes screen: ordered       Social History     Tobacco Use    Smoking status: Never     Passive exposure: Never    Smokeless tobacco: Never    Tobacco comments:     N/A   Substance Use Topics    Alcohol use: Never       SH:    Marital  status:    Kids: 1  Employment: [employer] -    Exercise: yes   Tobacco: no   Etoh: no   Recreational drugs: no               9/19/2023     3:24 PM   Alcohol Use   Prescreen: >3 drinks/day or >7 drinks/week? Not Applicable          No data to display              Reviewed orders with patient.  Reviewed health maintenance and updated orders accordingly - Yes  Lab work is in process  Labs reviewed in EPIC  BP Readings from Last 3 Encounters:   09/26/23 102/69   01/23/23 119/79   07/27/22 106/74    Wt Readings from Last 3 Encounters:   09/26/23 64.9 kg (143 lb)   01/23/23 66 kg (145 lb 6.4 oz)   07/27/22 62.1 kg (137 lb)                  Patient Active Problem List   Diagnosis    LTBI (latent tuberculosis infection)    Postablative hypothyroidism    Thyrotoxic exophthalmos    Hypovitaminosis D    History of diet controlled gestational diabetes mellitus (GDM)    Lactating mother    History of third degree perineal laceration    History of postpartum hemorrhage    Helicobacter pylori infection     Past Surgical History:   Procedure Laterality Date    NO HISTORY OF SURGERY         Social History     Tobacco Use    Smoking status: Never     Passive exposure: Never    Smokeless tobacco: Never    Tobacco comments:     N/A   Substance Use Topics    Alcohol use: Never     Family History   Problem Relation Age of Onset    Diabetes Mother     Hypertension Mother          Current Outpatient Medications   Medication Sig Dispense Refill    levothyroxine (SYNTHROID/LEVOTHROID) 88 MCG tablet TAKE 1 TABLET(88 MCG) BY MOUTH DAILY 90 tablet 0    ketotifen (ZADITOR) 0.025 % ophthalmic solution 1 drop in affected eye(s) 2 times a day 5 mL 3     Allergies   Allergen Reactions    Pseudoephedrine Other (See Comments)     Dizziness, leg weakness  Other reaction(s): Other, see comments  Dizziness, leg weakness     Recent Labs   Lab Test 01/23/23  1108 07/27/22  0849 06/23/22  1242 12/15/21  1307 11/09/21  1636 02/17/21  1254  20  0840 18  1521 10/04/18  1430 18  1300   A1C  --  5.4  --   --  5.4  --   --   --  5.4  --    LDL  --  87  --   --   --   --  94  --   --   --    HDL  --  47*  --   --   --   --  41*  --   --   --    TRIG  --  55  --   --   --   --  66  --   --   --    ALT  --  17  --   --   --   --  22  --   --   --    CR  --  0.62  --   --   --   --  0.64  --   --  0.62   GFRESTIMATED  --  >90  --   --   --   --  >90  --   --  >90   GFRESTBLACK  --   --   --   --   --   --  >90  --   --  >90   POTASSIUM  --  3.9  --   --   --   --  3.9  --   --  3.6   TSH 2.96  --  0.50   < > 3.33   < >  --    < > 15.71*  --     < > = values in this interval not displayed.        Breast Cancer Screenin/27/2022     6:42 AM   Breast CA Risk Assessment (FHS-7)   Do you have a family history of breast, colon, or ovarian cancer? No / Unknown         Patient under 40 years of age: Routine Mammogram Screening not recommended.   Pertinent mammograms are reviewed under the imaging tab.    History of abnormal Pap smear: NO - age 30-65 PAP every 5 years with negative HPV co-testing recommended     Reviewed and updated as needed this visit by clinical staff   Tobacco  Allergies  Meds   Med Hx  Surg Hx  Fam Hx          Reviewed and updated as needed this visit by Provider       Med Hx  Surg Hx  Fam Hx         Past Medical History:   Diagnosis Date    Asthma 2022    Diabetes (H)     Gestational diabetes diet controlled    Hyperemesis gravidarum, antepartum 10/5/2018    Thyroid disease       Past Surgical History:   Procedure Laterality Date    NO HISTORY OF SURGERY       OB History    Para Term  AB Living   1 1 1 0 0 1   SAB IAB Ectopic Multiple Live Births   0 0 0 0 1      # Outcome Date GA Lbr Mino/2nd Weight Sex Delivery Anes PTL Lv   1 Term 19 40w6d 13:30 / 01:46 3.67 kg (8 lb 1.5 oz) F Vag-Spont EPI N FRANKLIN      Name: WORKNEH,FEMALE-MAURILIO      Apgar1: 9  Apgar5: 9       Review of Systems  "  Constitutional:  Negative for chills and fever.   HENT:  Negative for congestion, ear pain, hearing loss and sore throat.    Eyes:  Negative for pain and visual disturbance.   Respiratory:  Negative for cough and shortness of breath.    Cardiovascular:  Negative for chest pain, palpitations and peripheral edema.   Gastrointestinal:  Positive for heartburn. Negative for abdominal pain, constipation, diarrhea, hematochezia and nausea.   Breasts:  Negative for tenderness, breast mass and discharge.   Genitourinary:  Negative for dysuria, frequency, genital sores, hematuria, pelvic pain, urgency, vaginal bleeding and vaginal discharge.   Musculoskeletal:  Negative for arthralgias, joint swelling and myalgias.   Skin:  Negative for rash.   Neurological:  Negative for dizziness, weakness, headaches and paresthesias.   Psychiatric/Behavioral:  Negative for mood changes. The patient is not nervous/anxious.           OBJECTIVE:   /69   Pulse 68   Temp 97.1  F (36.2  C) (Tympanic)   Resp 16   Ht 1.702 m (5' 7\")   Wt 64.9 kg (143 lb)   LMP 09/20/2023 (Exact Date)   SpO2 100%   BMI 22.40 kg/m    Physical Exam  GENERAL: healthy, alert and no distress  NECK: no adenopathy, no asymmetry, masses, or scars and thyroid normal to palpation  RESP: lungs clear to auscultation - no rales, rhonchi or wheezes  CV: regular rate and rhythm, normal S1 S2, no S3 or S4, no murmur, click or rub, no peripheral edema and peripheral pulses strong  ABDOMEN: soft, nontender, no hepatosplenomegaly, no masses and bowel sounds normal  MS: no gross musculoskeletal defects noted, no edema        ASSESSMENT/PLAN:   (Z00.00) Routine general medical examination at a health care facility  (primary encounter diagnosis)  Comment: Preventive care reviewed and updated.    Plan: Lipid panel reflex to direct LDL Fasting,         Comprehensive metabolic panel (BMP + Alb, Alk         Phos, ALT, AST, Total. Bili, TP), Hemoglobin         A1c   Declined " Pap today  Patient will schedule Pap with a female provider    (E89.0) Postablative hypothyroidism  No current symptoms.  Taking Synthroid   Plan: TSH with free T4 reflex         (E55.9) Hypovitaminosis D  History of low vitamin D  Plan: Vitamin D Deficiency            Patient has been advised of split billing requirements and indicates understanding: Yes      COUNSELING:  Reviewed preventive health counseling, as reflected in patient instructions       Regular exercise       Healthy diet/nutrition        She reports that she has never smoked. She has never been exposed to tobacco smoke. She has never used smokeless tobacco.          Philippe Damon MD  [facility]

## 2023-10-27 NOTE — PATIENT INSTRUCTIONS
To Schedule an Appointment 24/7  Call: [phone]    If you have any questions regarding your visit, Please contact your care team.  [facility]: [phone]  [facility] HOURS TELEPHONE NUMBER   Cephas Agbeh, M.D.      Lamar Hernandez-Surgery Scheduler  Lucy-Surgery Scheduler       Monday - [place]:    8:00 am-4:45 pm  Tuesday - [place]:   8:00 am-4:45 pm  Friday-[place]:       8:00 am-4:45 pm  Typical Surgery Day:  Wednesday [facility]   [address] [place] [address]   [phone]   Fax [phone]    Imaging Scheduling all locations  [phone]      [facility]   [address] [place] [address] [place] [address]   [phone]    [facility]  [address] [address] [address]  [phone]  Fax [phone]   Urgent Care locations:  [facility] Monday-Friday                               10 am - 8 pm  Saturday and Sunday                      9 am - 5 pm  Monday-Friday                              10 am- 8 pm  Saturday and Sunday                      9 am - 5 pm    [phone]   **Surgeries** Our Surgery Schedulers will contact you to schedule. If you do not receive a call within 3 business days, please call [phone].  If you need a medication refill, please contact your pharmacy. Please allow 3 business days for your refill to be completed.  As always, Thank you for trusting us with your healthcare needs!  see additional instructions from your care team below

## 2023-10-30 ENCOUNTER — OFFICE VISIT (OUTPATIENT)
Dept: OBGYN | Facility: CLINIC | Age: 38
End: 2023-10-30
Payer: COMMERCIAL

## 2023-10-30 VITALS
DIASTOLIC BLOOD PRESSURE: 62 MMHG | HEART RATE: 60 BPM | WEIGHT: 143.4 LBS | SYSTOLIC BLOOD PRESSURE: 92 MMHG | BODY MASS INDEX: 22.46 KG/M2 | OXYGEN SATURATION: 100 %

## 2023-10-30 DIAGNOSIS — Z12.4 CERVICAL CANCER SCREENING: Primary | ICD-10-CM

## 2023-10-30 DIAGNOSIS — Z32.00 PREGNANCY EXAMINATION OR TEST, PREGNANCY UNCONFIRMED: ICD-10-CM

## 2023-10-30 DIAGNOSIS — Z23 NEED FOR PROPHYLACTIC VACCINATION AND INOCULATION AGAINST INFLUENZA: ICD-10-CM

## 2023-10-30 DIAGNOSIS — Z01.419 NORMAL PELVIC EXAM: ICD-10-CM

## 2023-10-30 LAB — HCG UR QL: NEGATIVE

## 2023-10-30 PROCEDURE — 99214 OFFICE O/P EST MOD 30 MIN: CPT | Mod: 25 | Performed by: OBSTETRICS & GYNECOLOGY

## 2023-10-30 PROCEDURE — G0145 SCR C/V CYTO,THINLAYER,RESCR: HCPCS | Performed by: OBSTETRICS & GYNECOLOGY

## 2023-10-30 PROCEDURE — 81025 URINE PREGNANCY TEST: CPT | Performed by: OBSTETRICS & GYNECOLOGY

## 2023-10-30 PROCEDURE — 87624 HPV HI-RISK TYP POOLED RSLT: CPT | Performed by: OBSTETRICS & GYNECOLOGY

## 2023-10-30 PROCEDURE — 90686 IIV4 VACC NO PRSV 0.5 ML IM: CPT | Performed by: OBSTETRICS & GYNECOLOGY

## 2023-10-30 PROCEDURE — 90471 IMMUNIZATION ADMIN: CPT | Performed by: OBSTETRICS & GYNECOLOGY

## 2023-10-30 NOTE — PROGRESS NOTES
Rocio is a 37 year old  referred here by self for consultation regarding pelvic examand pap.  She has seen her FP for annual exam this year.  Requests UPT.Menses is 2 weeks late.  ROS: Ten point review of systems was reviewed and negative except the above.    Gyne: - abn pap (last pap ), - STD's    Past Medical History:   Diagnosis Date    Asthma 2022    Diabetes (H)     Gestational diabetes diet controlled    Hyperemesis gravidarum, antepartum 10/5/2018    Thyroid disease      Past Surgical History:   Procedure Laterality Date    NO HISTORY OF SURGERY       Patient Active Problem List   Diagnosis    LTBI (latent tuberculosis infection)    Postablative hypothyroidism    Thyrotoxic exophthalmos    Hypovitaminosis D    History of diet controlled gestational diabetes mellitus (GDM)    Lactating mother    History of third degree perineal laceration    History of postpartum hemorrhage    Helicobacter pylori infection       ALL/Meds: Her medication and allergy histories were reviewed and are documented in their appropriate chart areas.    SH: - tob, - EtOH,     FH: Her family history was reviewed and documented in its appropriate chart area.    PE: BP 92/62 (BP Location: Left arm, Patient Position: Sitting, Cuff Size: Adult Regular)   Pulse 60   Wt 65 kg (143 lb 6.4 oz)   LMP 2023 (Exact Date)   SpO2 100%   BMI 22.46 kg/m    Body mass index is 22.46 kg/m .    General Appearance:  healthy, alert, active, no distress  HEENT: NCAT  Abdomen: Soft, nontender.  Normal bowel sounds.  No masses  Pelvic:       - Ext: NEFG,        - Urethra: no lesions, no masses, no hypermobility       - Urethral Meatus: normal appearance, no       - Bladder: no tenderness, no masses       - Vagina: pink, moist, normal rugae, no lesions, no discharge       - Cervix: no lesions,parous       - Uterus: normal sized, AV,mobile, normal contour       - Adnexa: no masses, no tenderness       - Rectal: deferred,        -  Pelvic support: no cystocele, no rectocele, no uterine prolapse  Results for orders placed or performed in visit on 10/30/23   HCG Qual, Urine-  [facility] ([id number])     Status: Normal   Result Value Ref Range    hCG Urine Qualitative Negative Negative       A/P    ICD-10-CM    1. Cervical cancer screening  Z12.4 Pap Screen with HPV - recommended age 30 - 65 years     HCG Qual, Urine-  [facility] ([id number])     HCG Qual, Urine-  [facility] ([id number])      2. Normal pelvic exam  Z01.419 Pap Screen with HPV - recommended age 30 - 65 years     HCG Qual, Urine-  [facility] ([id number])     HCG Qual, Urine-  [facility] ([id number])      3. Pregnancy examination or test, pregnancy unconfirmed  Z32.00       4. Need for prophylactic vaccination and inoculation against influenza  Z23         Nurse for exam  Total time preparing to see patient with reviewing prior encounter and labs, face to face time,  and coordinating care on the same calendar date: 30 mins./    CEPHAS AGBEH, MD.

## 2023-10-30 NOTE — PROGRESS NOTES
Prior to immunization administration, verified patients identity using patient s name and date of birth. Please see Immunization Activity for additional information.     Screening Questionnaire for Adult Immunization    Are you sick today?   No   Do you have allergies to medications, food, a vaccine component or latex?   No   Have you ever had a serious reaction after receiving a vaccination?   No   Do you have a long-term health problem with heart, lung, kidney, or metabolic disease (e.g., diabetes), asthma, a blood disorder, no spleen, complement component deficiency, a cochlear implant, or a spinal fluid leak?  Are you on long-term aspirin therapy?   No   Do you have cancer, leukemia, HIV/AIDS, or any other immune system problem?   No   Do you have a parent, brother, or sister with an immune system problem?   No   In the past 3 months, have you taken medications that affect  your immune system, such as prednisone, other steroids, or anticancer drugs; drugs for the treatment of rheumatoid arthritis, Crohn s disease, or psoriasis; or have you had radiation treatments?   No   Have you had a seizure, or a brain or other nervous system problem?   No   During the past year, have you received a transfusion of blood or blood    products, or been given immune (gamma) globulin or antiviral drug?   No   For women: Are you pregnant or is there a chance you could become       pregnant during the next month?   No   Have you received any vaccinations in the past 4 weeks?   No     Immunization questionnaire answers were all negative.    Flu Vaccine      Patient instructed to remain in clinic for 15 minutes afterwards, and to report any adverse reactions.     Screening performed by Kyleigh Cummings CMA on 10/30/2023 at 9:04 AM.

## 2023-11-01 LAB
BKR LAB AP GYN ADEQUACY: NORMAL
BKR LAB AP GYN INTERPRETATION: NORMAL
BKR LAB AP HPV REFLEX: NORMAL
BKR LAB AP PREVIOUS ABNORMAL: NORMAL
PATH REPORT.COMMENTS IMP SPEC: NORMAL
PATH REPORT.COMMENTS IMP SPEC: NORMAL
PATH REPORT.RELEVANT HX SPEC: NORMAL

## 2023-11-03 LAB
HUMAN PAPILLOMA VIRUS 16 DNA: NEGATIVE
HUMAN PAPILLOMA VIRUS 18 DNA: NEGATIVE
HUMAN PAPILLOMA VIRUS FINAL DIAGNOSIS: NORMAL
HUMAN PAPILLOMA VIRUS OTHER HR: NEGATIVE

## 2023-12-18 DIAGNOSIS — E03.9 HYPOTHYROIDISM, UNSPECIFIED TYPE: ICD-10-CM

## 2023-12-18 RX ORDER — LEVOTHYROXINE SODIUM 88 UG/1
TABLET ORAL
Qty: 90 TABLET | Refills: 0 | OUTPATIENT
Start: 2023-12-18

## 2023-12-20 ENCOUNTER — TELEPHONE (OUTPATIENT)
Dept: LAB | Facility: CLINIC | Age: 38
End: 2023-12-20

## 2023-12-20 DIAGNOSIS — E03.9 HYPOTHYROIDISM, UNSPECIFIED TYPE: ICD-10-CM

## 2023-12-20 RX ORDER — LEVOTHYROXINE SODIUM 88 UG/1
88 TABLET ORAL DAILY
Qty: 90 TABLET | Refills: 0 | Status: CANCELLED | OUTPATIENT
Start: 2023-12-20

## 2023-12-20 NOTE — TELEPHONE ENCOUNTER
Reason for Call:  Medication or medication refill:    Do you use a [facility]?  Name of the pharmacy and phone number for the current request:  [place] [address] [address]     Name of the medication requested:     Other request: levothyroxine (SYNTHROID/LEVOTHROID) 88 MCG tablet     Can we leave a detailed message on this number? YES    Phone number patient can be reached at: Cell number on file:    Telephone Information:   Mobile [phone]       Best Time:     Call taken on 12/20/2023 at 12:17 PM by Paris Bernard

## 2023-12-22 RX ORDER — LEVOTHYROXINE SODIUM 88 UG/1
88 TABLET ORAL DAILY
Qty: 90 TABLET | Refills: 1 | Status: SHIPPED | OUTPATIENT
Start: 2023-12-22 | End: 2024-07-03

## 2023-12-22 NOTE — TELEPHONE ENCOUNTER
For some reason I can not send this electronically    I signs a paper Rx please fax to patient pharmacy     Philippe Damon MD.

## 2023-12-22 NOTE — TELEPHONE ENCOUNTER
Patient calling back to check status of Rx refill. She is out of Rx.    Silvia Jay,    [facility]

## 2024-02-25 ENCOUNTER — HEALTH MAINTENANCE LETTER (OUTPATIENT)
Age: 39
End: 2024-02-25

## 2024-07-01 DIAGNOSIS — E03.9 HYPOTHYROIDISM, UNSPECIFIED TYPE: ICD-10-CM

## 2024-07-03 RX ORDER — LEVOTHYROXINE SODIUM 88 UG/1
88 TABLET ORAL DAILY
Qty: 90 TABLET | Refills: 1 | Status: SHIPPED | OUTPATIENT
Start: 2024-07-03

## 2024-11-20 NOTE — ANESTHESIA PROCEDURE NOTES
Peripheral nerve/Neuraxial procedure note : epidural catheter  Pre-Procedure  Performed by Elizabeth Reddy MD  Location: OB      Pre-Anesthestic Checklist: patient identified, IV checked, site marked, risks and benefits discussed, informed consent, monitors and equipment checked, pre-op evaluation and at physician/surgeon's request    Timeout  Correct Patient: Yes   Correct Procedure: Yes   Correct Site: Yes   Correct Laterality: Yes   Correct Position: Yes   Site Marked: Yes   .   Procedure Documentation    .    Procedure:    Epidural catheter.  Insertion Site:L2-3  (midline approach) Injection technique: LORT saline and LORT air   Local skin infiltrated with 1 mL of 1% lidocaine.       Patient Prep;povidone-iodine 7.5% surgical scrub.  .  Needle: Touhy needle Needle Gauge: 17.    Needle Length (Inches) 3.5  # of attempts: 1 and # of redirects:  .   Catheter: 19 G . .  Catheter threaded easily  .  .   .    Assessment/Narrative  Paresthesias: No.  .  .  Aspiration negative for heme or CSF  . Test dose of 3 mL lidocaine 1.5% w/ 1:200,000 epinephrine at. Test dose negative for signs of intravascular, subdural or intrathecal injection. Comments:  Pre-procedure time out completed. Patient in sitting position, the lumbar spine was prepped and draped in sterile fashion. The L2/L3 interspace was identified and local anesthetic was injected for local skin infiltration. A 17 G touhy needle was advanced to the epidural space which was confirmed with the loss of resistance technique at 5 cm. A catheter was then advanced easily into the epidural space. The catheter was left at 9 cm at the skin. Negative aspiration of blood and CSF was confirmed. A test dose of 1.5% lidocaine with 1:200,000 epinephrine was injected through the catheter and was negative for intravascular injection. The site was covered with sterile tegaderm and the catheter was secured with tape.          Heterosexual

## 2024-11-30 ENCOUNTER — HEALTH MAINTENANCE LETTER (OUTPATIENT)
Age: 39
End: 2024-11-30

## 2024-12-30 ENCOUNTER — OFFICE VISIT (OUTPATIENT)
Dept: FAMILY MEDICINE | Facility: CLINIC | Age: 39
End: 2024-12-30
Payer: COMMERCIAL

## 2024-12-30 VITALS
DIASTOLIC BLOOD PRESSURE: 72 MMHG | TEMPERATURE: 97.7 F | HEIGHT: 66 IN | WEIGHT: 145 LBS | OXYGEN SATURATION: 100 % | BODY MASS INDEX: 23.3 KG/M2 | HEART RATE: 70 BPM | RESPIRATION RATE: 20 BRPM | SYSTOLIC BLOOD PRESSURE: 105 MMHG

## 2024-12-30 DIAGNOSIS — E89.0 POSTABLATIVE HYPOTHYROIDISM: ICD-10-CM

## 2024-12-30 DIAGNOSIS — Z00.00 ROUTINE GENERAL MEDICAL EXAMINATION AT A HEALTH CARE FACILITY: Primary | ICD-10-CM

## 2024-12-30 DIAGNOSIS — E55.9 HYPOVITAMINOSIS D: ICD-10-CM

## 2024-12-30 DIAGNOSIS — Z12.31 VISIT FOR SCREENING MAMMOGRAM: ICD-10-CM

## 2024-12-30 LAB
ALBUMIN SERPL BCG-MCNC: 4.2 G/DL (ref 3.5–5.2)
ALP SERPL-CCNC: 59 U/L (ref 40–150)
ALT SERPL W P-5'-P-CCNC: 13 U/L (ref 0–50)
ANION GAP SERPL CALCULATED.3IONS-SCNC: 8 MMOL/L (ref 7–15)
AST SERPL W P-5'-P-CCNC: 19 U/L (ref 0–45)
BASOPHILS # BLD AUTO: 0 10E3/UL (ref 0–0.2)
BASOPHILS NFR BLD AUTO: 0 %
BILIRUB SERPL-MCNC: 0.4 MG/DL
BUN SERPL-MCNC: 12.3 MG/DL (ref 6–20)
CALCIUM SERPL-MCNC: 9.2 MG/DL (ref 8.8–10.4)
CHLORIDE SERPL-SCNC: 107 MMOL/L (ref 98–107)
CHOLEST SERPL-MCNC: 155 MG/DL
CREAT SERPL-MCNC: 0.64 MG/DL (ref 0.51–0.95)
EGFRCR SERPLBLD CKD-EPI 2021: >90 ML/MIN/1.73M2
EOSINOPHIL # BLD AUTO: 0.1 10E3/UL (ref 0–0.7)
EOSINOPHIL NFR BLD AUTO: 1 %
ERYTHROCYTE [DISTWIDTH] IN BLOOD BY AUTOMATED COUNT: 12.8 % (ref 10–15)
EST. AVERAGE GLUCOSE BLD GHB EST-MCNC: 111 MG/DL
FASTING STATUS PATIENT QL REPORTED: YES
FASTING STATUS PATIENT QL REPORTED: YES
GLUCOSE SERPL-MCNC: 94 MG/DL (ref 70–99)
HBA1C MFR BLD: 5.5 % (ref 0–5.6)
HCO3 SERPL-SCNC: 22 MMOL/L (ref 22–29)
HCT VFR BLD AUTO: 37.9 % (ref 35–47)
HDLC SERPL-MCNC: 46 MG/DL
HGB BLD-MCNC: 12.7 G/DL (ref 11.7–15.7)
IMM GRANULOCYTES # BLD: 0 10E3/UL
IMM GRANULOCYTES NFR BLD: 0 %
LDLC SERPL CALC-MCNC: 94 MG/DL
LYMPHOCYTES # BLD AUTO: 1.6 10E3/UL (ref 0.8–5.3)
LYMPHOCYTES NFR BLD AUTO: 23 %
MCH RBC QN AUTO: 28.4 PG (ref 26.5–33)
MCHC RBC AUTO-ENTMCNC: 33.5 G/DL (ref 31.5–36.5)
MCV RBC AUTO: 85 FL (ref 78–100)
MONOCYTES # BLD AUTO: 0.4 10E3/UL (ref 0–1.3)
MONOCYTES NFR BLD AUTO: 6 %
NEUTROPHILS # BLD AUTO: 4.9 10E3/UL (ref 1.6–8.3)
NEUTROPHILS NFR BLD AUTO: 70 %
NONHDLC SERPL-MCNC: 109 MG/DL
PLATELET # BLD AUTO: 160 10E3/UL (ref 150–450)
POTASSIUM SERPL-SCNC: 4.3 MMOL/L (ref 3.4–5.3)
PROT SERPL-MCNC: 7.1 G/DL (ref 6.4–8.3)
RBC # BLD AUTO: 4.47 10E6/UL (ref 3.8–5.2)
SODIUM SERPL-SCNC: 137 MMOL/L (ref 135–145)
TRIGL SERPL-MCNC: 73 MG/DL
TSH SERPL DL<=0.005 MIU/L-ACNC: 0.66 UIU/ML (ref 0.3–4.2)
VIT D+METAB SERPL-MCNC: 24 NG/ML (ref 20–50)
WBC # BLD AUTO: 7 10E3/UL (ref 4–11)

## 2024-12-30 PROCEDURE — 83036 HEMOGLOBIN GLYCOSYLATED A1C: CPT | Performed by: FAMILY MEDICINE

## 2024-12-30 PROCEDURE — 99396 PREV VISIT EST AGE 40-64: CPT | Mod: 25 | Performed by: FAMILY MEDICINE

## 2024-12-30 PROCEDURE — 84443 ASSAY THYROID STIM HORMONE: CPT | Performed by: FAMILY MEDICINE

## 2024-12-30 PROCEDURE — 90471 IMMUNIZATION ADMIN: CPT | Performed by: FAMILY MEDICINE

## 2024-12-30 PROCEDURE — 90656 IIV3 VACC NO PRSV 0.5 ML IM: CPT | Performed by: FAMILY MEDICINE

## 2024-12-30 PROCEDURE — 36415 COLL VENOUS BLD VENIPUNCTURE: CPT | Performed by: FAMILY MEDICINE

## 2024-12-30 PROCEDURE — 80061 LIPID PANEL: CPT | Performed by: FAMILY MEDICINE

## 2024-12-30 PROCEDURE — 80053 COMPREHEN METABOLIC PANEL: CPT | Performed by: FAMILY MEDICINE

## 2024-12-30 PROCEDURE — 85025 COMPLETE CBC W/AUTO DIFF WBC: CPT | Performed by: FAMILY MEDICINE

## 2024-12-30 PROCEDURE — 82306 VITAMIN D 25 HYDROXY: CPT | Performed by: FAMILY MEDICINE

## 2024-12-30 SDOH — HEALTH STABILITY: PHYSICAL HEALTH: ON AVERAGE, HOW MANY DAYS PER WEEK DO YOU ENGAGE IN MODERATE TO STRENUOUS EXERCISE (LIKE A BRISK WALK)?: 2 DAYS

## 2024-12-30 ASSESSMENT — SOCIAL DETERMINANTS OF HEALTH (SDOH): HOW OFTEN DO YOU GET TOGETHER WITH FRIENDS OR RELATIVES?: TWICE A WEEK

## 2024-12-30 ASSESSMENT — PAIN SCALES - GENERAL: PAINLEVEL_OUTOF10: NO PAIN (0)

## 2024-12-30 NOTE — PATIENT INSTRUCTIONS
Patient Education   Preventive Care Advice   This is general advice given by our system to help you stay healthy. However, your care team may have specific advice just for you. Please talk to your care team about your preventive care needs.  Nutrition  Eat 5 or more servings of fruits and vegetables each day.  Try wheat bread, brown rice and whole grain pasta (instead of white bread, rice, and pasta).  Get enough calcium and vitamin D. Check the label on foods and aim for 100% of the RDA (recommended daily allowance).  Lifestyle  Exercise at least 150 minutes each week  (30 minutes a day, 5 days a week).  Do muscle strengthening activities 2 days a week. These help control your weight and prevent disease.  No smoking.  Wear sunscreen to prevent skin cancer.  Have a dental exam and cleaning every 6 months.  Yearly exams  See your health care team every year to talk about:  Any changes in your health.  Any medicines your care team has prescribed.  Preventive care, family planning, and ways to prevent chronic diseases.  Shots (vaccines)   HPV shots (up to age 26), if you've never had them before.  Hepatitis B shots (up to age 59), if you've never had them before.  COVID-19 shot: Get this shot when it's due.  Flu shot: Get a flu shot every year.  Tetanus shot: Get a tetanus shot every 10 years.  Pneumococcal, hepatitis A, and RSV shots: Ask your care team if you need these based on your risk.  Shingles shot (for age 50 and up)  General health tests  Diabetes screening:  Starting at age 35, Get screened for diabetes at least every 3 years.  If you are younger than age 35, ask your care team if you should be screened for diabetes.  Cholesterol test: At age 39, start having a cholesterol test every 5 years, or more often if advised.  Bone density scan (DEXA): At age 50, ask your care team if you should have this scan for osteoporosis (brittle bones).  Hepatitis C: Get tested at least once in your life.  STIs (sexually  transmitted infections)  Before age 24: Ask your care team if you should be screened for STIs.  After age 24: Get screened for STIs if you're at risk. You are at risk for STIs (including HIV) if:  You are sexually active with more than one person.  You don't use condoms every time.  You or a partner was diagnosed with a sexually transmitted infection.  If you are at risk for HIV, ask about PrEP medicine to prevent HIV.  Get tested for HIV at least once in your life, whether you are at risk for HIV or not.  Cancer screening tests  Cervical cancer screening: If you have a cervix, begin getting regular cervical cancer screening tests starting at age 21.  Breast cancer scan (mammogram): If you've ever had breasts, begin having regular mammograms starting at age 40. This is a scan to check for breast cancer.  Colon cancer screening: It is important to start screening for colon cancer at age 45.  Have a colonoscopy test every 10 years (or more often if you're at risk) Or, ask your provider about stool tests like a FIT test every year or Cologuard test every 3 years.  To learn more about your testing options, visit:   .  For help making a decision, visit:   https://bit.ly/bc41071.  Prostate cancer screening test: If you have a prostate, ask your care team if a prostate cancer screening test (PSA) at age 55 is right for you.  Lung cancer screening: If you are a current or former smoker ages 50 to 80, ask your care team if ongoing lung cancer screenings are right for you.  For informational purposes only. Not to replace the advice of your health care provider. Copyright   2023 [employer]. All rights reserved. Clinically reviewed by the [facility]. [employer] [id number] - REV 01/24.

## 2024-12-30 NOTE — PROGRESS NOTES
Preventive Care Visit  [facility]  Philippe Damon MD, Family Medicine  Dec 30, 2024      Assessment & Plan     Routine general medical examination at a health care facility  Health maintenance screening and immunizations reviewed with the patient.    We discussed healthy lifestyle, nutrition, cardiovascular risk reduction.  - Screen Labs ordered   - Continue great active lifestyle  - Follow up yearly for the annual physical and preventive care.    - CBC with Platelets & Differential; Future  - Comprehensive metabolic panel; Future  - Hemoglobin A1c; Future  - Lipid panel reflex to direct LDL Fasting; Future    Postablative hypothyroidism    - TSH WITH FREE T4 REFLEX; Future    Visit for screening mammogram    - MA Screening Bilateral w/ Irvin; Future    Hypovitaminosis D    - Vitamin D Deficiency; Future            Counseling  Appropriate preventive services were addressed with this patient via screening, questionnaire, or discussion as appropriate for fall prevention, nutrition, physical activity, Tobacco-use cessation, social engagement, weight loss and cognition.  Checklist reviewing preventive services available has been given to the patient.  Reviewed patient's diet, addressing concerns and/or questions.   She is at risk for lack of exercise and has been provided with information to increase physical activity for the benefit of her well-being.   The patient was instructed to see the dentist every 6 months.           Radha Jennings is a 39 year old, presenting for the following:  Physical        12/30/2024     8:39 AM   Additional Questions   Roomed by Dia   Accompanied by self          HPI    Preventive -     Immunization History   Administered Date(s) Administered    COVID-19 MONOVALENT 12+ (Pfizer) 01/14/2021, 02/04/2021    Hepatitis A (ADULT 19+) 06/09/2009, 06/30/2010    Hepatitis B, Adult 02/24/2004, 03/24/2004, 07/01/2004    Influenza (IIV3) PF 10/10/2006    Influenza Vaccine >6  "months,quad, PF 10/22/2021, 10/30/2023    MMR 07/03/2003, 02/23/2004, 03/23/2004    Meningococcal ACWY (Menactra ) 06/09/2009    Meningococcal ACWY (Menveo ) 05/19/2017    Poliovirus, inactivated (IPV) 06/09/2009    TD,PF 7+ (Tenivac) 07/03/2003, 02/23/2004, 08/24/2004, 03/29/2005    TDAP Vaccine (Adacel) 06/09/2009, 04/08/2015, 04/22/2019    Td (Adult), Adsorbed 07/03/2003, 02/23/2004, 08/24/2004    Typhoid IM 06/09/2009, 04/08/2015, 05/19/2017, 04/19/2021    Yellow Fever 06/12/2009         -Mammogram: ordered       -PAP: done 2023   Next 2028     No results found for: \"PAP\"      - Colon CA screen: Colonoscopy, age 45-75 every 10 years or FIT every year or Cologuard every 3 years   No fam hx of colon cancer       -lipids screen: ordered     Diabetes screen: ordere d    Wt Readings from Last 4 Encounters:   12/30/24 65.8 kg (145 lb)   10/30/23 65 kg (143 lb 6.4 oz)   09/26/23 64.9 kg (143 lb)   01/23/23 66 kg (145 lb 6.4 oz)               Health Care Directive  Patient does not have a Health Care Directive: Discussed advance care planning with patient; however, patient declined at this time.      12/30/2024   General Health   How would you rate your overall physical health? Good   Feel stress (tense, anxious, or unable to sleep) Only a little   (!) STRESS CONCERN      12/30/2024   Nutrition   Three or more servings of calcium each day? (!) NO   Diet: I don't know   How many servings of fruit and vegetables per day? (!) 2-3   How many sweetened beverages each day? 0-1         12/30/2024   Exercise   Days per week of moderate/strenous exercise 2 days   (!) EXERCISE CONCERN      12/30/2024   Social Factors   Frequency of gathering with friends or relatives Twice a week   Worry food won't last until get money to buy more No   Food not last or not have enough money for food? No   Do you have housing? (Housing is defined as stable permanent housing and does not include staying ouside in a car, in a tent, in an abandoned " building, in an overnight shelter, or couch-surfing.) Yes   Are you worried about losing your housing? No   Lack of transportation? No   Unable to get utilities (heat,electricity)? No         12/30/2024   Dental   Dentist two times every year? (!) NO         12/30/2024   TB Screening   Were you born outside of the [place]? Yes         Today's PHQ-2 Score:       12/30/2024     8:34 AM   PHQ-2 ( 1999 Pfizer)   Q1: Little interest or pleasure in doing things 1   Q2: Feeling down, depressed or hopeless 0   PHQ-2 Score 1    Q1: Little interest or pleasure in doing things Several days   Q2: Feeling down, depressed or hopeless Not at all   PHQ-2 Score 1       Patient-reported           12/30/2024   Substance Use   Alcohol more than 3/day or more than 7/wk Not Applicable   Do you use any other substances recreationally? No     Social History     Tobacco Use    Smoking status: Never     Passive exposure: Never    Smokeless tobacco: Never    Tobacco comments:     N/A   Vaping Use    Vaping status: Never Used   Substance Use Topics    Alcohol use: Never    Drug use: Never          Mammogram Screening - Mammogram every 1-2 years updated in Health Maintenance based on mutual decision making        12/30/2024   STI Screening   New sexual partner(s) since last STI/HIV test? No     History of abnormal Pap smear: No - age 30- 64 PAP with HPV every 5 years recommended        Latest Ref Rng & Units 10/30/2023     8:15 AM 5/9/2017    12:00 AM   PAP / HPV   PAP  Negative for Intraepithelial Lesion or Malignancy (NILM)     HPV 16 DNA Negative Negative     HPV 18 DNA Negative Negative     Other HR HPV Negative Negative     PAP-ABSTRACT   See Scanned Document           This result is from an external source.     ASCVD Risk   The 10-year ASCVD risk score (Amy PINZON, et al., 2019) is: 0.2%    Values used to calculate the score:      Age: 39 years      Sex: Female      Is Non- : Yes      Diabetic: No      Tobacco  smoker: No      Systolic Blood Pressure: 105 mmHg      Is BP treated: No      HDL Cholesterol: 55 mg/dL      Total Cholesterol: 166 mg/dL        2024   Contraception/Family Planning   Questions about contraception or family planning No        Reviewed and updated as needed this visit by Provider                    Past Medical History:   Diagnosis Date    Asthma 2022    Diabetes (H)     Gestational diabetes diet controlled    Hyperemesis gravidarum, antepartum 10/5/2018    Thyroid disease      Past Surgical History:   Procedure Laterality Date    NO HISTORY OF SURGERY       OB History    Para Term  AB Living   1 1 1 0 0 1   SAB IAB Ectopic Multiple Live Births   0 0 0 0 1      # Outcome Date GA Lbr Mino/2nd Weight Sex Type Anes PTL Lv   1 Term 19 40w6d 13::46 3.67 kg (8 lb 1.5 oz) F Vag-Spont EPI N FRANKLIN      Name: PEPPER WAN      Apgar1: 9  Apgar5: 9     Lab work is in process  Labs reviewed in EPIC  BP Readings from Last 3 Encounters:   24 105/72   10/30/23 92/62   23 102/69    Wt Readings from Last 3 Encounters:   24 65.8 kg (145 lb)   10/30/23 65 kg (143 lb 6.4 oz)   23 64.9 kg (143 lb)                  Patient Active Problem List   Diagnosis    LTBI (latent tuberculosis infection)    Postablative hypothyroidism    Thyrotoxic exophthalmos    Hypovitaminosis D    History of diet controlled gestational diabetes mellitus (GDM)    Lactating mother    History of third degree perineal laceration    History of postpartum hemorrhage    Helicobacter pylori infection     Past Surgical History:   Procedure Laterality Date    NO HISTORY OF SURGERY         Social History     Tobacco Use    Smoking status: Never     Passive exposure: Never    Smokeless tobacco: Never    Tobacco comments:     N/A   Substance Use Topics    Alcohol use: Never     Family History   Problem Relation Age of Onset    Diabetes Mother     Hypertension Mother          Current  "Outpatient Medications   Medication Sig Dispense Refill    levothyroxine (SYNTHROID/LEVOTHROID) 88 MCG tablet TAKE 1 TABLET BY MOUTH DAILY 90 tablet 1    ketotifen (ZADITOR) 0.025 % ophthalmic solution 1 drop in affected eye(s) 2 times a day (Patient not taking: Reported on 12/30/2024) 5 mL 3     Allergies   Allergen Reactions    Pseudoephedrine Other (See Comments)     Dizziness, leg weakness  Other reaction(s): Other, see comments  Dizziness, leg weakness     Recent Labs   Lab Test 09/26/23  1031 01/23/23  1108 07/27/22  0849 12/15/21  1307 11/09/21  1636 02/17/21  1254 12/21/20  0840 10/04/18  1430 09/25/18  1300   A1C 5.4  --  5.4  --  5.4  --   --    < >  --    LDL 94  --  87  --   --   --  94  --   --    HDL 55  --  47*  --   --   --  41*  --   --    TRIG 84  --  55  --   --   --  66  --   --    ALT 11  --  17  --   --   --  22  --   --    CR 0.63  --  0.62  --   --   --  0.64  --  0.62   GFRESTIMATED >90  --  >90  --   --   --  >90  --  >90   GFRESTBLACK  --   --   --   --   --   --  >90  --  >90   POTASSIUM 4.3  --  3.9  --   --   --  3.9  --  3.6   TSH 5.93* 2.96  --    < > 3.33   < >  --    < >  --     < > = values in this interval not displayed.          Review of Systems  Constitutional, HEENT, cardiovascular, pulmonary, gi and gu systems are negative, except as otherwise noted.     Objective    Exam  /72   Pulse 70   Temp 97.7  F (36.5  C) (Tympanic)   Resp 20   Ht 1.676 m (5' 6\")   Wt 65.8 kg (145 lb)   LMP 12/12/2024 (Exact Date)   SpO2 100%   BMI 23.40 kg/m     Estimated body mass index is 23.4 kg/m  as calculated from the following:    Height as of this encounter: 1.676 m (5' 6\").    Weight as of this encounter: 65.8 kg (145 lb).    Physical Exam  GENERAL: alert and no distress  NECK: no adenopathy, no asymmetry, masses, or scars  RESP: lungs clear to auscultation - no rales, rhonchi or wheezes  CV: regular rate and rhythm, normal S1 S2, no S3 or S4, no murmur, click or rub, no " peripheral edema  ABDOMEN: soft, nontender, no hepatosplenomegaly, no masses and bowel sounds normal  MS: no gross musculoskeletal defects noted, no edema        Signed Electronically by: Philippe Damon MD

## 2025-01-06 DIAGNOSIS — E03.9 HYPOTHYROIDISM, UNSPECIFIED TYPE: ICD-10-CM

## 2025-01-06 RX ORDER — LEVOTHYROXINE SODIUM 88 UG/1
88 TABLET ORAL DAILY
Qty: 90 TABLET | Refills: 2 | Status: SHIPPED | OUTPATIENT
Start: 2025-01-06

## 2025-01-16 ENCOUNTER — TELEPHONE (OUTPATIENT)
Dept: FAMILY MEDICINE | Facility: CLINIC | Age: 40
End: 2025-01-16

## 2025-01-16 ENCOUNTER — OFFICE VISIT (OUTPATIENT)
Dept: FAMILY MEDICINE | Facility: CLINIC | Age: 40
End: 2025-01-16
Payer: COMMERCIAL

## 2025-01-16 VITALS
WEIGHT: 147.4 LBS | HEIGHT: 66 IN | HEART RATE: 80 BPM | RESPIRATION RATE: 18 BRPM | OXYGEN SATURATION: 100 % | TEMPERATURE: 97.3 F | SYSTOLIC BLOOD PRESSURE: 100 MMHG | DIASTOLIC BLOOD PRESSURE: 70 MMHG | BODY MASS INDEX: 23.69 KG/M2

## 2025-01-16 DIAGNOSIS — L03.90 CELLULITIS, UNSPECIFIED CELLULITIS SITE: ICD-10-CM

## 2025-01-16 DIAGNOSIS — L08.9 SKIN PUSTULE: Primary | ICD-10-CM

## 2025-01-16 DIAGNOSIS — B07.9 VIRAL WART ON FINGER: ICD-10-CM

## 2025-01-16 DIAGNOSIS — L98.9 SKIN LESION: ICD-10-CM

## 2025-01-16 RX ORDER — DOXYCYCLINE 100 MG/1
100 CAPSULE ORAL 2 TIMES DAILY
Qty: 20 CAPSULE | Refills: 0 | Status: SHIPPED | OUTPATIENT
Start: 2025-01-16 | End: 2025-01-26

## 2025-01-16 ASSESSMENT — PAIN SCALES - GENERAL: PAINLEVEL_OUTOF10: NO PAIN (0)

## 2025-01-16 NOTE — PROGRESS NOTES
Assessment & Plan     Skin pustule  Offered I and D, patient would like to try soaks to encourage it to drain on its own and antibiotics first  See patient instructions below for more plan.  Follow up if symptoms worsen or change. To ER with severe worsening symptoms.     - doxycycline hyclate (VIBRAMYCIN) 100 MG capsule; Take 1 capsule (100 mg) by mouth 2 times daily for 10 days. With food. Use sunscreen.    Cellulitis, unspecified cellulitis site    - doxycycline hyclate (VIBRAMYCIN) 100 MG capsule; Take 1 capsule (100 mg) by mouth 2 times daily for 10 days. With food. Use sunscreen.    Skin lesion  On left jaw, previously benign but came back  - Adult Dermatology  Referral; Future          Patient Instructions   Warm soaks 20 minutes three times a day  Go to urgent care to have it drained if not improving in 24 hours or if worsening  To er with fevers      Dr mariee's wart remover topical acid on wart on finger  The longitudinal plan of care for the diagnosis(es)/condition(s) as documented were addressed during this visit. Due to the added complexity in care, I will continue to support Dick in the subsequent management and with ongoing continuity of care.    30 min spent on patient today including chart review, history, answering patient questions,  exam, and explaining treatment plan and follow-up.       Radha Jennings is a 39 year old, presenting for the following health issues:  Mass (hand)        1/16/2025    12:23 PM   Additional Questions   Roomed by Rubia CALLE MA   Accompanied by n/a         1/16/2025    12:23 PM   Patient Reported Additional Medications   Patient reports taking the following new medications No Medications Missing     History of Present Illness       Reason for visit:  Pop op on hand  Symptom onset:  1-3 days ago  Symptoms include:  Swelling and redness  Symptom intensity:  Mild  Symptom progression:  Improving  Had these symptoms before:  No  What makes it worse:  No  What  "makes it better:  No   She is taking medications regularly.     New patient to me:  Would like Derm referral for a lesion on her left jaw area that previously was removed and came back.  Has been growing.  Also has a wart on her right finger but she would like to try over-the-counter treatment first.  Her main concern today is as follows:  Bump is on her left arm, below her wrist anteriorly. Noticed it come up last week, thinking her watch had caused some skin irritation. Painful to the touch (mild), red, about dime sized in diameter w/ redness around the site. Pt reports no fever or chills    Per triage note 1/16/25: I called and spoke to patient, she is at the light to turn into the clinic for the visit.   She says she has a small lump on her hand and she works at [employer] and was told to get this looked at by her doctor as it could be an infection and maybe will need to be drained.        She denies severe symptoms, says it is small.   I advised her the provider can examine her and diagnose and provide Rx if needed for infection but if drainage is needed, that will NOT be done today.    She would likely get a referral for that.     Patient verbalized understanding of and agreement with plan.     She will keep appt as scheduled.           Todays notes: patient noticed a week ago small amount of redness. Now 1-2 days looked like a pimple. Is tender. No fever. Redness has started spreading some. No drainage yet but has central area. Has not tried heat yet. Left distal forearm dorsal aspect. Is able to move her wrist and fingers normally no edema. No paresthesias.     No recent antibiotics. No history of mrsa or skin infections. No known trauma.       Objective    /70   Pulse 80   Temp 97.3  F (36.3  C) (Temporal)   Resp 18   Ht 1.67 m (5' 5.75\")   Wt 66.9 kg (147 lb 6.4 oz)   LMP 01/09/2025 (Approximate)   SpO2 100%   BMI 23.97 kg/m    Body mass index is 23.97 kg/m .  Physical Exam   GENERAL: alert and no " distress  RESP: lungs clear to auscultation - no rales, rhonchi or wheezes  CV: regular rate and rhythm, normal S1 S2, no S3 or S4, no murmur, click or rub, no peripheral edema  MS: no gross musculoskeletal defects noted, no edema  SKIN: left distal forearm dorsal aspect-central pustule with surrounding erythema about 1 cm in diameter. Tender and warm. Looks close to draining. A line was drawn around the area of ? Cellulitis.   PSYCH: mentation appears normal, affect normal/bright            Signed Electronically by: Brielle Shah PA-C

## 2025-01-16 NOTE — PATIENT INSTRUCTIONS
Warm soaks 20 minutes three times a day  Go to urgent care to have it drained if not improving in 24 hours or if worsening  To er with fevers      Dr mariee's wart remover topical acid on wart on finger

## 2025-01-16 NOTE — TELEPHONE ENCOUNTER
I called and spoke to patient, she is at the light to turn into the clinic for the visit.   She says she has a small lump on her hand and she works at [employer] and was told to get this looked at by her doctor as it could be an infection and maybe will need to be drained.       She denies severe symptoms, says it is small.   I advised her the provider can examine her and diagnose and provide Rx if needed for infection but if drainage is needed, that will NOT be done today.    She would likely get a referral for that.    Patient verbalized understanding of and agreement with plan.    She will keep appt as scheduled.    Jayne MOSES RN  [facility]

## 2025-01-16 NOTE — TELEPHONE ENCOUNTER
Please call patient on schedule soon, on schedule for hand infection needs drainage? I do not drain hands so I would recommend urgent care instead please. Brielle Shah PA-C

## 2025-03-14 ENCOUNTER — ANCILLARY PROCEDURE (OUTPATIENT)
Dept: MAMMOGRAPHY | Facility: CLINIC | Age: 40
End: 2025-03-14
Attending: FAMILY MEDICINE
Payer: COMMERCIAL

## 2025-03-14 DIAGNOSIS — Z12.31 VISIT FOR SCREENING MAMMOGRAM: ICD-10-CM

## 2025-03-14 PROCEDURE — 77063 BREAST TOMOSYNTHESIS BI: CPT | Mod: TC | Performed by: RADIOLOGY

## 2025-03-14 PROCEDURE — 77067 SCR MAMMO BI INCL CAD: CPT | Mod: TC | Performed by: RADIOLOGY
